# Patient Record
Sex: FEMALE | Race: BLACK OR AFRICAN AMERICAN | NOT HISPANIC OR LATINO | Employment: UNEMPLOYED | URBAN - METROPOLITAN AREA
[De-identification: names, ages, dates, MRNs, and addresses within clinical notes are randomized per-mention and may not be internally consistent; named-entity substitution may affect disease eponyms.]

---

## 2019-03-26 ENCOUNTER — INITIAL PRENATAL (OUTPATIENT)
Dept: OBSTETRICS AND GYNECOLOGY | Facility: CLINIC | Age: 34
End: 2019-03-26
Payer: MEDICAID

## 2019-03-26 ENCOUNTER — APPOINTMENT (OUTPATIENT)
Dept: LAB | Facility: HOSPITAL | Age: 34
End: 2019-03-26
Attending: OBSTETRICS & GYNECOLOGY
Payer: MEDICAID

## 2019-03-26 VITALS — SYSTOLIC BLOOD PRESSURE: 100 MMHG | DIASTOLIC BLOOD PRESSURE: 64 MMHG | WEIGHT: 114 LBS

## 2019-03-26 DIAGNOSIS — Z3A.18 18 WEEKS GESTATION OF PREGNANCY: Primary | ICD-10-CM

## 2019-03-26 LAB
ABO + RH BLD: NORMAL
BASOPHILS # BLD AUTO: 0.06 K/UL (ref 0–0.2)
BASOPHILS NFR BLD: 0.6 % (ref 0–1.9)
BLD GP AB SCN CELLS X3 SERPL QL: NORMAL
DIFFERENTIAL METHOD: ABNORMAL
EOSINOPHIL # BLD AUTO: 0 K/UL (ref 0–0.5)
EOSINOPHIL NFR BLD: 0.2 % (ref 0–8)
ERYTHROCYTE [DISTWIDTH] IN BLOOD BY AUTOMATED COUNT: 13.4 % (ref 11.5–14.5)
HCT VFR BLD AUTO: 32.2 % (ref 37–48.5)
HGB BLD-MCNC: 10.4 G/DL (ref 12–16)
IMM GRANULOCYTES # BLD AUTO: 0.04 K/UL (ref 0–0.04)
IMM GRANULOCYTES NFR BLD AUTO: 0.4 % (ref 0–0.5)
LYMPHOCYTES # BLD AUTO: 1.9 K/UL (ref 1–4.8)
LYMPHOCYTES NFR BLD: 19.6 % (ref 18–48)
MCH RBC QN AUTO: 30.3 PG (ref 27–31)
MCHC RBC AUTO-ENTMCNC: 32.3 G/DL (ref 32–36)
MCV RBC AUTO: 94 FL (ref 82–98)
MONOCYTES # BLD AUTO: 0.5 K/UL (ref 0.3–1)
MONOCYTES NFR BLD: 5.1 % (ref 4–15)
NEUTROPHILS # BLD AUTO: 7 K/UL (ref 1.8–7.7)
NEUTROPHILS NFR BLD: 74.1 % (ref 38–73)
NRBC BLD-RTO: 0 /100 WBC
PLATELET # BLD AUTO: 293 K/UL (ref 150–350)
PMV BLD AUTO: 10.1 FL (ref 9.2–12.9)
RBC # BLD AUTO: 3.43 M/UL (ref 4–5.4)
WBC # BLD AUTO: 9.43 K/UL (ref 3.9–12.7)

## 2019-03-26 PROCEDURE — 99202 OFFICE O/P NEW SF 15 MIN: CPT | Mod: PBBFAC,PO | Performed by: ADVANCED PRACTICE MIDWIFE

## 2019-03-26 PROCEDURE — 87086 URINE CULTURE/COLONY COUNT: CPT

## 2019-03-26 PROCEDURE — 85025 COMPLETE CBC W/AUTO DIFF WBC: CPT

## 2019-03-26 PROCEDURE — 86703 HIV-1/HIV-2 1 RESULT ANTBDY: CPT

## 2019-03-26 PROCEDURE — 99999 PR PBB SHADOW E&M-NEW PATIENT-LVL II: ICD-10-PCS | Mod: PBBFAC,,, | Performed by: ADVANCED PRACTICE MIDWIFE

## 2019-03-26 PROCEDURE — 87340 HEPATITIS B SURFACE AG IA: CPT

## 2019-03-26 PROCEDURE — 86850 RBC ANTIBODY SCREEN: CPT

## 2019-03-26 PROCEDURE — 99213 PR OFFICE/OUTPT VISIT, EST, LEVL III, 20-29 MIN: ICD-10-PCS | Mod: TH,S$PBB,, | Performed by: ADVANCED PRACTICE MIDWIFE

## 2019-03-26 PROCEDURE — 86592 SYPHILIS TEST NON-TREP QUAL: CPT

## 2019-03-26 PROCEDURE — 99213 OFFICE O/P EST LOW 20 MIN: CPT | Mod: TH,S$PBB,, | Performed by: ADVANCED PRACTICE MIDWIFE

## 2019-03-26 PROCEDURE — 99999 PR PBB SHADOW E&M-NEW PATIENT-LVL II: CPT | Mod: PBBFAC,,, | Performed by: ADVANCED PRACTICE MIDWIFE

## 2019-03-26 PROCEDURE — 86762 RUBELLA ANTIBODY: CPT

## 2019-03-26 NOTE — PROGRESS NOTES
In today for initial OB visit- pt with previous prenatal care in [place]- reports had US done but unsure of JASWANT. Discussed scheduling of dating / guero scan. Will obtain NOB labs today. Pt with recent diagnosis of pilonidal cyst- was drained and packed in an ER yesterday. Will schedule pt at [facility] for follow up.

## 2019-03-27 ENCOUNTER — SURGICAL CONSULT (OUTPATIENT)
Dept: SURGERY | Facility: CLINIC | Age: 34
End: 2019-03-27
Payer: MEDICAID

## 2019-03-27 VITALS — DIASTOLIC BLOOD PRESSURE: 56 MMHG | SYSTOLIC BLOOD PRESSURE: 98 MMHG | HEART RATE: 90 BPM | WEIGHT: 117.31 LBS

## 2019-03-27 DIAGNOSIS — L05.01 PILONIDAL CYST WITH ABSCESS: Primary | ICD-10-CM

## 2019-03-27 LAB
BACTERIA UR CULT: NORMAL
HBV SURFACE AG SERPL QL IA: NEGATIVE
HIV 1+2 AB+HIV1 P24 AG SERPL QL IA: NEGATIVE
RPR SER QL: NORMAL
RUBV IGG SER-ACNC: 53.1 IU/ML
RUBV IGG SER-IMP: REACTIVE

## 2019-03-27 PROCEDURE — 99204 PR OFFICE/OUTPT VISIT, NEW, LEVL IV, 45-59 MIN: ICD-10-PCS | Mod: S$PBB,,, | Performed by: SURGERY

## 2019-03-27 PROCEDURE — 99204 OFFICE O/P NEW MOD 45 MIN: CPT | Mod: S$PBB,,, | Performed by: SURGERY

## 2019-03-27 RX ORDER — AMOXICILLIN AND CLAVULANATE POTASSIUM 875; 125 MG/1; MG/1
1 TABLET, FILM COATED ORAL EVERY 12 HOURS
Qty: 10 TABLET | Refills: 0 | Status: SHIPPED | OUTPATIENT
Start: 2019-03-27 | End: 2019-04-01

## 2019-03-27 NOTE — H&P
History & Physical    SUBJECTIVE:     History of Present Illness:  Patient is a 33 y.o. female presents with pilonidal abscess s/p I and D in [facility]. States she had symptoms for a few days and went to ED, where it was drained and she was ent home with packing.  No follow up was given at time and she was not given PO abx.  Packing was removed in clinic today and she still had some residual purulent fluid  Repacked with iodoform and discussed need to change packing in 2 days with family  Given abx and explained importance of taking them as prescribed  Discussed wound care instructions as well such as showering and dressings  Chief Complaint   Patient presents with    Pilonidal Disease    Consult       Review of patient's allergies indicates:  No Known Allergies    Current Outpatient Medications   Medication Sig Dispense Refill    amoxicillin-clavulanate 875-125mg (AUGMENTIN) 875-125 mg per tablet Take 1 tablet by mouth every 12 (twelve) hours. for 5 days 10 tablet 0     No current facility-administered medications for this visit.        History reviewed. No pertinent past medical history.  History reviewed. No pertinent surgical history.  History reviewed. No pertinent family history.  Social History     Tobacco Use    Smoking status: Never Smoker    Smokeless tobacco: Never Used   Substance Use Topics    Alcohol use: Not Currently    Drug use: Never        Review of Systems:  Review of Systems   Constitutional: Negative for appetite change, fatigue, fever and unexpected weight change.   HENT: Negative for sore throat and trouble swallowing.    Eyes: Negative.    Respiratory: Negative for cough, shortness of breath and wheezing.    Cardiovascular: Negative for chest pain and leg swelling.   Gastrointestinal: Negative for abdominal distention, abdominal pain, blood in stool, constipation, diarrhea, nausea and vomiting.   Endocrine: Negative.    Genitourinary: Negative.    Musculoskeletal: Negative  for back pain.        +gluteal cleft pain   Skin: Negative.  Negative for rash.   Allergic/Immunologic: Negative.    Neurological: Negative.    Hematological: Negative.    Psychiatric/Behavioral: Negative for confusion.       OBJECTIVE:     Vital Signs (Most Recent)  Pulse: 90 (03/27/19 1416)  BP: (!) 98/56 (03/27/19 1416)     53.2 kg (117 lb 4.6 oz)     Physical Exam:  Physical Exam   Constitutional: She is oriented to person, place, and time. She appears well-developed and well-nourished.   HENT:   Head: Normocephalic and atraumatic.   Eyes: EOM are normal.   Neck: Normal range of motion.   Cardiovascular: Normal rate and normal heart sounds.   Pulmonary/Chest: Effort normal.   Abdominal: Soft. Bowel sounds are normal. She exhibits no distension. There is no tenderness.   Musculoskeletal: Normal range of motion.   Neurological: She is alert and oriented to person, place, and time.   Skin: Skin is warm and dry. Capillary refill takes less than 2 seconds.        Psychiatric: She has a normal mood and affect. Her behavior is normal.   Nursing note and vitals reviewed.    ASSESSMENT/PLAN:     Pilonidal abscess s/p I and D    PLAN:Plan     PO abx  Packing removal on Friday  RTC next week if not improved

## 2019-03-28 ENCOUNTER — TELEPHONE (OUTPATIENT)
Dept: MATERNAL FETAL MEDICINE | Facility: CLINIC | Age: 34
End: 2019-03-28

## 2019-03-28 NOTE — TELEPHONE ENCOUNTER
Scheduled patient for next available on 4/9 at 3:00. Pt states she has to  her child at 2:45 and let her know we would try to see her before her appointment time if there are any no-shows. States she will figure out  if not. Verbalizes understanding of appt time and location.

## 2019-04-09 ENCOUNTER — ROUTINE PRENATAL (OUTPATIENT)
Dept: OBSTETRICS AND GYNECOLOGY | Facility: CLINIC | Age: 34
End: 2019-04-09
Payer: MEDICAID

## 2019-04-09 ENCOUNTER — PROCEDURE VISIT (OUTPATIENT)
Dept: MATERNAL FETAL MEDICINE | Facility: CLINIC | Age: 34
End: 2019-04-09
Payer: MEDICAID

## 2019-04-09 VITALS — DIASTOLIC BLOOD PRESSURE: 60 MMHG | WEIGHT: 116.63 LBS | SYSTOLIC BLOOD PRESSURE: 100 MMHG

## 2019-04-09 DIAGNOSIS — Z34.02 ENCOUNTER FOR SUPERVISION OF NORMAL FIRST PREGNANCY IN SECOND TRIMESTER: Primary | ICD-10-CM

## 2019-04-09 DIAGNOSIS — Z3A.18 18 WEEKS GESTATION OF PREGNANCY: ICD-10-CM

## 2019-04-09 DIAGNOSIS — Z36.89 ENCOUNTER FOR ULTRASOUND TO CHECK FETAL GROWTH: Primary | ICD-10-CM

## 2019-04-09 PROCEDURE — 99213 PR OFFICE/OUTPT VISIT, EST, LEVL III, 20-29 MIN: ICD-10-PCS | Mod: TH,S$PBB,, | Performed by: ADVANCED PRACTICE MIDWIFE

## 2019-04-09 PROCEDURE — 99999 PR PBB SHADOW E&M-EST. PATIENT-LVL III: CPT | Mod: PBBFAC,,, | Performed by: ADVANCED PRACTICE MIDWIFE

## 2019-04-09 PROCEDURE — 99213 OFFICE O/P EST LOW 20 MIN: CPT | Mod: TH,S$PBB,, | Performed by: ADVANCED PRACTICE MIDWIFE

## 2019-04-09 PROCEDURE — 99499 UNLISTED E&M SERVICE: CPT | Mod: S$PBB,,, | Performed by: PEDIATRICS

## 2019-04-09 PROCEDURE — 99499 NO LOS: ICD-10-PCS | Mod: S$PBB,,, | Performed by: PEDIATRICS

## 2019-04-09 PROCEDURE — 99213 OFFICE O/P EST LOW 20 MIN: CPT | Mod: PBBFAC,TH,PO,25 | Performed by: ADVANCED PRACTICE MIDWIFE

## 2019-04-09 PROCEDURE — 76805 OB US >/= 14 WKS SNGL FETUS: CPT | Mod: PBBFAC,PO | Performed by: PEDIATRICS

## 2019-04-09 PROCEDURE — 99999 PR PBB SHADOW E&M-EST. PATIENT-LVL III: ICD-10-PCS | Mod: PBBFAC,,, | Performed by: ADVANCED PRACTICE MIDWIFE

## 2019-04-09 PROCEDURE — 76805 OB US >/= 14 WKS SNGL FETUS: CPT | Mod: 26,S$PBB,, | Performed by: PEDIATRICS

## 2019-04-09 PROCEDURE — 76805 PR US, OB 14+WKS, TRANSABD, SINGLE GESTATION: ICD-10-PCS | Mod: 26,S$PBB,, | Performed by: PEDIATRICS

## 2019-04-09 NOTE — PROGRESS NOTES
Chief Complaint   Patient presents with    Routine Prenatal Visit       33 y.o., at 20w1d by Estimated Date of Delivery: 19    Complaints today: None    ROS  OBSTETRICS:   Contractions denies   Bleeding denies   Loss of fluid denies   Fetal mvmnt slight  GASTRO:   Nausea none   Vomiting none      OB History    Para Term  AB Living   1             SAB TAB Ectopic Multiple Live Births                  # Outcome Date GA Lbr Kaden/2nd Weight Sex Delivery Anes PTL Lv   1 Current                Dating reviewed  Allergies and problem list reviewed and updated  Medical and surgical history reviewed  Prenatal labs reviewed and updated    PHYSICAL EXAM  /60   Wt 52.9 kg (116 lb 10 oz)   LMP 2018 (Exact Date)     GENERAL: No acute distress  NEURO: Alert and oriented x3  PSYCH: Normal mood and affect  PULMONARY: Non-labored respiration  ABDomen: Soft, gravid, nontender    ASSESSMENT AND PLAN    active 2019 Problems (from 19 to present)     No problems associated with this episode.            Reports had appt with general surgery for evaluation of pilonidal cyst- reports packing has been removed and it is much improved   labor precautions given  Follow-up: 4 weeks

## 2019-04-10 NOTE — PROGRESS NOTES
Indication  ========    Fetal anatomy survey/ LTOC.    Method  ======    Transabdominal ultrasound examination, 2D Color Doppler, Voluson E8. View: Suboptimal view: limited by fetal position.    Pregnancy  =========    Lobato pregnancy. Number of fetuses: 1.    Dating  ======    LMP on: 11/19/2018  Cycle: regular cycle  GA by LMP 20 w + 1 d  JASWANT by LMP: 8/26/2019  Ultrasound examination on: 4/9/2019  GA by U/S based upon: AC, BPD, Femur, HC  GA by U/S 21 w + 1 d  JASWANT by U/S: 8/19/2019  Assigned: Dating performed on 04/9/2019, based on the LMP  Assigned GA 20 w + 1 d  Assigned JASWANT: 8/26/2019    General Evaluation  ==============    Cardiac activity: present.  bpm.  Fetal movements: visualized.  Presentation: cephalic.  Placenta: posterior.  Umbilical cord: placental insertion: normal, normal, 3 vessel cord.  Amniotic fluid: normal amount.    Fetal Biometry  ============    Fetal Biometry  BPD 51.8 mm 21w 5d Hadlock  OFD 61.2 mm 21w 0d Jazzmine  .3 mm 20w 5d Hadlock  .2 mm 21w 2d Hadlock  Femur 34.3 mm 20w 6d Hadlock  Humerus 34.0 mm 21w 4d Jazzmine   g 43% Ernst  Calculated by: Hadlock (BPD-HC-AC-FL)  EFW (lb) 0 lb  EFW (oz) 14 oz  Cephalic index 0.85  HC / AC 1.13  FL / BPD 0.66  FL / HC 0.19  FL / AC 0.21   bpm    Fetal Anatomy  ============    Cranium: normal  Lateral ventricles: suboptimal  Choroid plexus: normal  Midline falx: normal  Cavum septi pellucidi: normal  Cerebellum: suboptimal  Cisterna magna: suboptimal  Rt lateral ventricle: suboptimal  Lt lateral ventricle: suboptimal  Rt choroid plexus: normal  Lt choroid plexus: normal  Posterior fossa: suboptimal  Nuchal fold: suboptimal  Lips: abnormal  Profile: normal  Nose: suboptimal  4-chamber view: normal  RVOT: normal  LVOT: normal  Situs: normal  Cardiac position: normal  Cardiac axis: normal  Cardiac size: normal  Cardiac rhythm: normal  Rt lung: normal  Lt lung: normal  Diaphragm: normal  Cord  insertion: normal  Stomach: normal  Kidneys: normal  Bladder: normal  Genitals: normal  Abdom. wall: appears normal  Abdom. cavity: normal  Rt kidney: normal  Lt kidney: normal  Cervical spine: normal  Thoracic spine: normal  Lumbar spine: normal  Sacral spine: normal  Arms: normal  Legs: normal  Rt arm: normal  Lt arm: normal  Rt hand: present  Lt hand: present  Rt leg: normal  Lt leg: normal  Rt foot: normal  Lt foot: normal  Gender: male  Wants to know gender: yes    Maternal Structures  ===============    Uterus / Cervix  Uterus: Normal  Cervical length 28.9 mm  Ovaries / Tubes / Adnexa  Rt ovary: Visualized  Rt ovary D1 2.6 cm  Rt ovary D2 1.9 cm  Rt ovary D3 1.8 cm  Rt ovary mean 2.1 cm  Rt ovary vol 4.7 cm³  Lt ovary: Visualized  Lt ovary D1 2.7 cm  Lt ovary D2 2.4 cm  Lt ovary D3 1.4 cm  Lt ovary mean 2.2 cm  Lt ovary vol 4.7 cm³          Impression  =========    A mustafa living IUP is identified.    Fetal size is appropriate for established dating.    No fetal structural malformations are identified; however, the anatomic survey is incomplete as noted above. The patient will be scheduled for a  f/u exam to complete the anatomic survey.    Cervical length is normal, and placental location is normal without evidence of previa.            Recommendation  ==============    We recommend evaluation in 4 weeks.      Thank you again for allowing us to participate in the care of your patients. If you have any questions concerning today's consultation, feel free  to contact me or one of my partners. We can be reached at [phone] during normal business hours. If you have a question after normal  business hours, please contact Labor and Delivery at [phone].

## 2019-05-07 ENCOUNTER — PROCEDURE VISIT (OUTPATIENT)
Dept: MATERNAL FETAL MEDICINE | Facility: CLINIC | Age: 34
End: 2019-05-07
Payer: MEDICAID

## 2019-05-07 ENCOUNTER — ROUTINE PRENATAL (OUTPATIENT)
Dept: OBSTETRICS AND GYNECOLOGY | Facility: CLINIC | Age: 34
End: 2019-05-07
Payer: MEDICAID

## 2019-05-07 ENCOUNTER — APPOINTMENT (OUTPATIENT)
Dept: LAB | Facility: HOSPITAL | Age: 34
End: 2019-05-07
Attending: ADVANCED PRACTICE MIDWIFE
Payer: MEDICAID

## 2019-05-07 VITALS — WEIGHT: 123 LBS | SYSTOLIC BLOOD PRESSURE: 100 MMHG | DIASTOLIC BLOOD PRESSURE: 60 MMHG

## 2019-05-07 DIAGNOSIS — Z34.02 ENCOUNTER FOR SUPERVISION OF NORMAL FIRST PREGNANCY IN SECOND TRIMESTER: ICD-10-CM

## 2019-05-07 DIAGNOSIS — Z3A.24 24 WEEKS GESTATION OF PREGNANCY: Primary | ICD-10-CM

## 2019-05-07 DIAGNOSIS — Z36.89 ENCOUNTER FOR ULTRASOUND TO CHECK FETAL GROWTH: ICD-10-CM

## 2019-05-07 LAB
BASOPHILS # BLD AUTO: 0.08 K/UL (ref 0–0.2)
BASOPHILS NFR BLD: 0.7 % (ref 0–1.9)
DIFFERENTIAL METHOD: ABNORMAL
EOSINOPHIL # BLD AUTO: 0 K/UL (ref 0–0.5)
EOSINOPHIL NFR BLD: 0.3 % (ref 0–8)
ERYTHROCYTE [DISTWIDTH] IN BLOOD BY AUTOMATED COUNT: 14.2 % (ref 11.5–14.5)
GLUCOSE SERPL-MCNC: 64 MG/DL (ref 70–140)
HCT VFR BLD AUTO: 32.7 % (ref 37–48.5)
HGB BLD-MCNC: 10.6 G/DL (ref 12–16)
IMM GRANULOCYTES # BLD AUTO: 0.26 K/UL (ref 0–0.04)
IMM GRANULOCYTES NFR BLD AUTO: 2.2 % (ref 0–0.5)
LYMPHOCYTES # BLD AUTO: 2.3 K/UL (ref 1–4.8)
LYMPHOCYTES NFR BLD: 19.1 % (ref 18–48)
MCH RBC QN AUTO: 31 PG (ref 27–31)
MCHC RBC AUTO-ENTMCNC: 32.4 G/DL (ref 32–36)
MCV RBC AUTO: 96 FL (ref 82–98)
MONOCYTES # BLD AUTO: 0.9 K/UL (ref 0.3–1)
MONOCYTES NFR BLD: 7.1 % (ref 4–15)
NEUTROPHILS # BLD AUTO: 8.5 K/UL (ref 1.8–7.7)
NEUTROPHILS NFR BLD: 70.6 % (ref 38–73)
NRBC BLD-RTO: 0 /100 WBC
PLATELET # BLD AUTO: 261 K/UL (ref 150–350)
PMV BLD AUTO: 10.3 FL (ref 9.2–12.9)
RBC # BLD AUTO: 3.42 M/UL (ref 4–5.4)
WBC # BLD AUTO: 12.03 K/UL (ref 3.9–12.7)

## 2019-05-07 PROCEDURE — 99213 PR OFFICE/OUTPT VISIT, EST, LEVL III, 20-29 MIN: ICD-10-PCS | Mod: TH,S$PBB,, | Performed by: ADVANCED PRACTICE MIDWIFE

## 2019-05-07 PROCEDURE — 76816 PR  US,PREGNANT UTERUS,F/U,TRANSABD APP: ICD-10-PCS | Mod: 26,S$PBB,, | Performed by: PEDIATRICS

## 2019-05-07 PROCEDURE — 76816 OB US FOLLOW-UP PER FETUS: CPT | Mod: 26,S$PBB,, | Performed by: PEDIATRICS

## 2019-05-07 PROCEDURE — 82950 GLUCOSE TEST: CPT

## 2019-05-07 PROCEDURE — 99499 NO LOS: ICD-10-PCS | Mod: S$PBB,,, | Performed by: PEDIATRICS

## 2019-05-07 PROCEDURE — 76816 OB US FOLLOW-UP PER FETUS: CPT | Mod: PBBFAC,PO | Performed by: PEDIATRICS

## 2019-05-07 PROCEDURE — 99499 UNLISTED E&M SERVICE: CPT | Mod: S$PBB,,, | Performed by: PEDIATRICS

## 2019-05-07 PROCEDURE — 99213 OFFICE O/P EST LOW 20 MIN: CPT | Mod: PBBFAC,TH,PO,25 | Performed by: ADVANCED PRACTICE MIDWIFE

## 2019-05-07 PROCEDURE — 99213 OFFICE O/P EST LOW 20 MIN: CPT | Mod: TH,S$PBB,, | Performed by: ADVANCED PRACTICE MIDWIFE

## 2019-05-07 PROCEDURE — 99999 PR PBB SHADOW E&M-EST. PATIENT-LVL III: ICD-10-PCS | Mod: PBBFAC,,, | Performed by: ADVANCED PRACTICE MIDWIFE

## 2019-05-07 PROCEDURE — 85025 COMPLETE CBC W/AUTO DIFF WBC: CPT

## 2019-05-07 PROCEDURE — 99999 PR PBB SHADOW E&M-EST. PATIENT-LVL III: CPT | Mod: PBBFAC,,, | Performed by: ADVANCED PRACTICE MIDWIFE

## 2019-05-07 NOTE — PROGRESS NOTES
Indication  ========    Follow-up evaluation for fetal growth, Follow-up evaluation of anatomy.    Method  ======    Transabdominal ultrasound examination, 2D Color Doppler, Voluson S8. View: Good view.    Pregnancy  =========    Lobato pregnancy. Number of fetuses: 1.    Dating  ======    LMP on: 11/19/2018  Cycle: regular cycle  GA by LMP 24 w + 1 d  JASWANT by LMP: 8/26/2019  Ultrasound examination on: 5/7/2019  GA by U/S based upon: AC, BPD, Femur, HC  GA by U/S 25 w + 5 d  JASWANT by U/S: 8/15/2019  Assigned: Dating performed on 04/9/2019, based on the LMP  Assigned GA 24 w + 1 d  Assigned JASWANT: 8/26/2019    General Evaluation  ==============    Cardiac activity: present.  bpm.  Fetal movements: visualized.  Presentation: cephalic.  Placenta: posterior.  Umbilical cord: 3 vessel cord.  Amniotic fluid: MVP 7.2 cm.    Fetal Biometry  ============    Fetal Biometry  BPD 65.4 mm 26w 3d Hadlock  OFD 82.3 mm 26w 5d Jazzmine  .5 mm 25w 6d Hadlock  .7 mm 25w 0d Hadlock  Femur 46.3 mm 25w 3d Hadlock   g 67% Ernst  Calculated by: Hadlock (BPD-HC-AC-FL)  EFW (lb) 1 lb  EFW (oz) 12 oz  Cephalic index 0.79  HC / AC 1.16  FL / BPD 0.71  FL / HC 0.19  FL / AC 0.23  MVP 7.2 cm   bpm  Head / Face / Neck   7.9 mm    Fetal Anatomy  ============    Cranium: normal  Lateral ventricles: normal  Cerebellum: normal  Cisterna magna: normal  Posterior fossa: normal  Lips: normal  Nose: normal  Stomach: normal  Kidneys: normal  Bladder: normal  Genitals: documented previously  Gender: male  Wants to know gender: yes          Impression  =========    The fetal anatomic survey was completed today, and no fetal structural abnormalities were noted.  Interval fetal growth has been normal, and the AFV is normal.    F/U as clinically indicated.      NOTE: Genetic screening?            Recommendation  ==============    Repeat ultrasound study as clinically indicated.      Thank you again for allowing us to  participate in the care of your patients. If you have any questions concerning today's consultation, feel free  to contact me or one of my partners. We can be reached at [phone] during normal business hours. If you have a question after normal  business hours, please contact Labor and Delivery at [phone].

## 2019-05-08 NOTE — PROGRESS NOTES
Chief Complaint   Patient presents with    Routine Prenatal Visit       33 y.o., at 24w2d by Estimated Date of Delivery: 19    Complaints today: None    ROS  OBSTETRICS:   Contractions denies   Bleeding denies   Loss of fluid denies   Fetal mvmnt Reports good FM  GASTRO:   Nausea denies   Vomiting denies      OB History    Para Term  AB Living   1             SAB TAB Ectopic Multiple Live Births                  # Outcome Date GA Lbr Kaden/2nd Weight Sex Delivery Anes PTL Lv   1 Current                Dating reviewed  Allergies and problem list reviewed and updated  Medical and surgical history reviewed  Prenatal labs reviewed and updated    PHYSICAL EXAM  /60   Wt 55.8 kg (123 lb 0.3 oz)   LMP 2018 (Exact Date)     GENERAL: No acute distress  HEENT: Normocephalic  NEURO: Alert and oriented x3  PSYCH: Normal mood and affect  PULMONARY: Non-labored respiration; no tachypnea  ABD: Soft, gravid, nontender; no hernia or hepatosplenomegaly    ASSESSMENT AND PLAN    active 2019 Problems (from 19 to present)     No problems associated with this episode.            DM screen done today   labor precautions given  Follow-up: 4 weeks

## 2019-06-04 ENCOUNTER — ROUTINE PRENATAL (OUTPATIENT)
Dept: OBSTETRICS AND GYNECOLOGY | Facility: CLINIC | Age: 34
End: 2019-06-04
Payer: MEDICAID

## 2019-06-04 VITALS
DIASTOLIC BLOOD PRESSURE: 58 MMHG | SYSTOLIC BLOOD PRESSURE: 100 MMHG | BODY MASS INDEX: 21.91 KG/M2 | HEIGHT: 64 IN | WEIGHT: 128.31 LBS

## 2019-06-04 DIAGNOSIS — Z34.03 ENCOUNTER FOR SUPERVISION OF NORMAL FIRST PREGNANCY IN THIRD TRIMESTER: ICD-10-CM

## 2019-06-04 PROCEDURE — 99999 PR PBB SHADOW E&M-EST. PATIENT-LVL III: ICD-10-PCS | Mod: PBBFAC,,, | Performed by: STUDENT IN AN ORGANIZED HEALTH CARE EDUCATION/TRAINING PROGRAM

## 2019-06-04 PROCEDURE — 99999 PR PBB SHADOW E&M-EST. PATIENT-LVL III: CPT | Mod: PBBFAC,,, | Performed by: STUDENT IN AN ORGANIZED HEALTH CARE EDUCATION/TRAINING PROGRAM

## 2019-06-04 PROCEDURE — 99213 OFFICE O/P EST LOW 20 MIN: CPT | Mod: TH,S$PBB,, | Performed by: STUDENT IN AN ORGANIZED HEALTH CARE EDUCATION/TRAINING PROGRAM

## 2019-06-04 PROCEDURE — 99213 PR OFFICE/OUTPT VISIT, EST, LEVL III, 20-29 MIN: ICD-10-PCS | Mod: TH,S$PBB,, | Performed by: STUDENT IN AN ORGANIZED HEALTH CARE EDUCATION/TRAINING PROGRAM

## 2019-06-04 PROCEDURE — 99213 OFFICE O/P EST LOW 20 MIN: CPT | Mod: PBBFAC,TH,PO | Performed by: STUDENT IN AN ORGANIZED HEALTH CARE EDUCATION/TRAINING PROGRAM

## 2019-06-04 RX ORDER — FERROUS SULFATE 324(65)MG
325 TABLET, DELAYED RELEASE (ENTERIC COATED) ORAL DAILY
Refills: 0 | COMMUNITY
Start: 2019-06-04 | End: 2024-03-25

## 2019-06-04 NOTE — PROGRESS NOTES
Subjective:       Patient ID: Katey Cedillo is a 33 y.o. female.    Chief Complaint:  Routine Prenatal Visit      History of Present Illness  Katey Cedillo is a 33 y.o. F at 28w1d presents for routine ob visit. Patient is without complaint today and overall feeling well. She is considering ppBTL but would like to discuss with her partner before officially signing consents.   This IUP is complicated by anemia.  Patient denies contractions, denies vaginal bleeding, denies LOF.   Fetal Movement: normal.        GYN & OB History  Patient's last menstrual period was 2018 (exact date).   Date of Last Pap: No result found    OB History    Para Term  AB Living   1             SAB TAB Ectopic Multiple Live Births                  # Outcome Date GA Lbr Kaden/2nd Weight Sex Delivery Anes PTL Lv   1 Current                Review of Systems  Review of Systems   Constitutional: Negative for activity change, appetite change, chills and fever.   Eyes: Negative for visual disturbance.   Respiratory: Negative for cough and shortness of breath.    Cardiovascular: Negative for chest pain.   Gastrointestinal: Negative for abdominal pain, constipation, nausea and vomiting.   Genitourinary: Negative for dysuria, frequency, hematuria, pelvic pain, urgency, vaginal bleeding, vaginal discharge and vaginal odor.   Musculoskeletal: Negative for myalgias.   Integumentary:  Negative for rash.   Neurological: Negative for headaches.   Psychiatric/Behavioral: Negative for depression and sleep disturbance. The patient is not nervous/anxious.            Objective:    Physical Exam:   Constitutional: She is oriented to person, place, and time. She appears well-developed and well-nourished. No distress.    HENT:   Head: Normocephalic and atraumatic.    Eyes: Conjunctivae and EOM are normal.    Neck: Normal range of motion. Neck supple. No thyromegaly present.    Cardiovascular: Normal rate and regular rhythm.      Pulmonary/Chest: Effort normal. No respiratory distress.        Abdominal: Soft. She exhibits no distension. There is no tenderness.             Musculoskeletal: Normal range of motion and moves all extremeties. She exhibits no edema or tenderness.       Neurological: She is alert and oriented to person, place, and time.    Skin: Skin is warm and dry. No rash noted.    Psychiatric: She has a normal mood and affect. Her behavior is normal.          Assessment:        1. Encounter for supervision of normal first pregnancy in third trimester               Plan:      Katey was seen today for routine prenatal visit.    Diagnoses and all orders for this visit:    Encounter for supervision of normal first pregnancy in third trimester  - labs UTD  - hemoglobin 10.7; begin iron   - considering ppBTL; sign consents at next visit after she discusses with partner    Other orders  -     ferrous sulfate 324 mg (65 mg iron) TbEC; Take 1 tablet (324 mg total) by mouth once daily.        No orders of the defined types were placed in this encounter.      Follow up in about 2 weeks (around 6/18/2019).    Vida Spring MD  OBGYN, PGY-1

## 2019-06-19 ENCOUNTER — PATIENT MESSAGE (OUTPATIENT)
Dept: OBSTETRICS AND GYNECOLOGY | Facility: CLINIC | Age: 34
End: 2019-06-19

## 2019-07-22 ENCOUNTER — INITIAL PRENATAL (OUTPATIENT)
Dept: OBSTETRICS AND GYNECOLOGY | Facility: CLINIC | Age: 34
End: 2019-07-22
Payer: MEDICAID

## 2019-07-22 VITALS — WEIGHT: 132.81 LBS | DIASTOLIC BLOOD PRESSURE: 54 MMHG | BODY MASS INDEX: 22.8 KG/M2 | SYSTOLIC BLOOD PRESSURE: 100 MMHG

## 2019-07-22 DIAGNOSIS — Z34.92 PRENATAL CARE IN SECOND TRIMESTER: ICD-10-CM

## 2019-07-22 DIAGNOSIS — Z34.93 PRENATAL CARE IN THIRD TRIMESTER: Primary | ICD-10-CM

## 2019-07-22 PROCEDURE — 99213 PR OFFICE/OUTPT VISIT, EST, LEVL III, 20-29 MIN: ICD-10-PCS | Mod: S$PBB,TH,, | Performed by: ADVANCED PRACTICE MIDWIFE

## 2019-07-22 PROCEDURE — 99213 OFFICE O/P EST LOW 20 MIN: CPT | Mod: S$PBB,TH,, | Performed by: ADVANCED PRACTICE MIDWIFE

## 2019-07-22 PROCEDURE — 99212 OFFICE O/P EST SF 10 MIN: CPT | Mod: PBBFAC | Performed by: ADVANCED PRACTICE MIDWIFE

## 2019-07-22 PROCEDURE — 99999 PR PBB SHADOW E&M-EST. PATIENT-LVL II: ICD-10-PCS | Mod: PBBFAC,,, | Performed by: ADVANCED PRACTICE MIDWIFE

## 2019-07-22 PROCEDURE — 99999 PR PBB SHADOW E&M-EST. PATIENT-LVL II: CPT | Mod: PBBFAC,,, | Performed by: ADVANCED PRACTICE MIDWIFE

## 2019-07-22 PROCEDURE — 87491 CHLMYD TRACH DNA AMP PROBE: CPT

## 2019-07-22 NOTE — PROGRESS NOTES
Katey Cedillo is a 33 y.o. , presents today for a transfer of prenatal care within the [facility] system    C/C: transfer of prenatal care from [facility].    Dating via LMP c/w 20 US    Reports positive FM, denies VB, LOF or cramping.     SOCIAL HISTORY: Denies emotional/mental/physical/sexual violence or abuse. Feels safe at home.  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    PAP HISTORY: last pap beginning of preg possibly in [place] (need to get records)     Reports no long-term chronic medical conditions     Review of patient's allergies indicates:  No Known Allergies  Past Medical History:   Diagnosis Date    Depression     --alternative treatments ( hospitalization x 1)      History reviewed. No pertinent surgical history.  History reviewed. No pertinent surgical history.  OB History    Para Term  AB Living   2 1 1     1   SAB TAB Ectopic Multiple Live Births           1      # Outcome Date GA Lbr Kaden/2nd Weight Sex Delivery Anes PTL Lv   2 Current            1 Term 10/22/13 38w0d  2.892 kg (6 lb 6 oz) F Vag-Spont EPI N YUMIKO      Birth Comments: water broke evening      OB History        2    Para   1    Term   1            AB        Living   1       SAB        TAB        Ectopic        Multiple        Live Births   1               Social History     Socioeconomic History    Marital status: Single     Spouse name: Alvarado-partner    Number of children: Not on file    Years of education: Not on file    Highest education level: Not on file   Occupational History    Occupation: [employer]   Social Needs    Financial resource strain: Not on file    Food insecurity:     Worry: Not on file     Inability: Not on file    Transportation needs:     Medical: Not on file     Non-medical: Not on file   Tobacco Use    Smoking status: Never Smoker    Smokeless tobacco: Never Used   Substance and Sexual Activity    Alcohol  use: Not Currently    Drug use: Never    Sexual activity: Yes     Partners: Male     Comment: Nemo   Lifestyle    Physical activity:     Days per week: Not on file     Minutes per session: Not on file    Stress: Only a little   Relationships    Social connections:     Talks on phone: Not on file     Gets together: Not on file     Attends Zoroastrian service: Not on file     Active member of club or organization: Not on file     Attends meetings of clubs or organizations: Not on file     Relationship status: Not on file   Other Topics Concern    Not on file   Social History Narrative    Not on file     Family History   Problem Relation Age of Onset    Breast cancer Paternal Grandmother         > 49 y/o at diagnosis    Breast cancer Cousin         40's at diagnosis    Hypertension Neg Hx     Cancer Neg Hx     Ovarian cancer Neg Hx     Colon cancer Neg Hx      Social History     Substance and Sexual Activity   Sexual Activity Yes    Partners: Male    Comment: Nemo         GENETIC SCREENING   Patient's age 35 years or older as of estimated date of delivery? n  Neural tube defect (meningomyelocele, spina bifida, or anencephaly)? n  Down syndrome? n  Johann-Sachs (Ashkenazi Lutheran, Cajun, Turkmen [place])? n  Canavan disease (Ashkenazi Lutheran)? n  Familial dysautonomia (Ashkenazi Lutheran)? n  Sickle cell disease or trait ()? n  Hemophilia or other blood disorders? n  Cystic fibrosis? n  Muscular dystrophy? n  Lisa's chorea? n  Thalassemia (Italian, Greek, Mediterranean, or  background) MCV less than 80? n  Congenital heart defect? n  Mental retardation/autism? n   If Yes, was person tested for Fragile X? n  Other inherited genetic or chromosomal disorder? n  Maternal metabolic disorder (e.g. type 1 diabetes, PKU)? nn  Patient or baby's father had a child with birth defects not listed above? n  Recurrent pregnancy loss or a stillbirth: n  Medications (including supplements, vitamins, herbs  or OTC drugs)/illicit/recreational drugs/alcohol since last menstrual period? n   If yes, agent(s) and strength/dose: n  List any other genetic risks: n  Comments/counseling: n    INFECTION HISTORY  Live with someone with TB or exposed to TB: n  Patient or partner has history of genital herpes: n  Rash or viral illness since last menstrual period: n  Patient or partner has hepatitis B or C: n  History of STD, gonorrhea, chlamydia, HPV, HIV, syphilis (list all that apply): n  List other infections: n  Additional comments: n    OBJECTIVE: see prenatal flow sheet  BP (!) 100/54   Wt 60.2 kg (132 lb 13.2 oz)   LMP 2018 (Exact Date)   BMI 22.80 kg/m²   Constitutional/Gen: NAD, appears stated age, well groomed  Lung: normal resp effort  Extremities: FROM, with no edema or tenderness.  Neurologic: A&O x 4, non-focal, cranial nerves 2-12 grossly intact  Psych: affect appropriate and without signs of mood, thought or memory difficulty appreciated    ASSESSMENT:  33 y.o. female  at 35w2d for transfer of prenatal care  Body mass index is 22.8 kg/m².  Patient Active Problem List   Diagnosis    Encounter for supervision of normal first pregnancy in third trimester       PLAN:  1. Transfer of prenatal care within [facility]  -- Continue prenatal care  -- GBS culture next visit, tdap discussed and ordered    2. BMI 19  -- Discussed IOM recommended weight gain of:   Weight category Pre pre BMI  Recommended TWG   Underweight Less than 18.5 28-40    Normal Weight 18.5-24.9  25-35    Overweight 25-29.9  15-25    Obese   30 and greater  11-20   -- Discussed criteria for delivery at [facility] r/t excessive pre-preg weight or excessive weight gain:   Pre-pregnancy BMI over 40 or excess pregnancy weight gain defined as:   Pre-preg BMI < 18.5; Excess weight gain = > 60 pound   Pre-preg BMI 18.5-24.9;  Excess weight gain = > 53 pounds   Pre-preg BMI 25-29.9;  Excess weight gain = > 38 pounds   Pre-preg BMI > 30;  Excess  weight gain = > 30 pounds    3. Oriented to practice and midwifery service  -- Pregnancy education and couseling; handouts and booklet provided  -- She has not already attended [facility] and has not toured facility  -- Reviewed anticipated prenatal course of care and how to contact us  -- Reviewed [facility] guidelines and admission, exclusion, and transfer criteria  -- Precautions/warning signs reviewed  -- Common complaints of pregnancy  -- Routine prenatal labs including HIV  -- Ultrasounds  -- Childbirth education/hospital/[facility] facilities  -- Nutrition, prepregnant BMI, and recommended weight gain  -- Toxoplasmosis precautions (Cats/Raw Meat)  -- Sexual activity and exercise  -- Environmental/Work hazards  -- Travel  -- Tobacco (Ask, Advise, Assess, Assist, and Arrange), as well as alcohol and drug use  -- Use of any medications (Including supplements, Vitamins, Herbs, or OTC Drugs)  -- Domestic violence screen negative    4. Patient states she had genetic testing in [place] (try to get records)      6. History of depression--currently stable and resources provided, no current problems.  7. Reviewed warning signs, precautions, and how/when to contact us.     8. RTC x 1 weeks, call or present sooner prn.   Updated Medication List:  Current Outpatient Medications   Medication Sig Dispense Refill    prenatal vit,calc76/iron/folic (PNV 29-1 ORAL) Take 1 tablet by mouth once daily.      ferrous sulfate 324 mg (65 mg iron) TbEC Take 1 tablet (324 mg total) by mouth once daily.  0     No current facility-administered medications for this visit.          Coral Hernandez CNM, MSN  07/22/2019  1:48 PM      7/22/2019 1:48 PM

## 2019-07-23 LAB
C TRACH DNA SPEC QL NAA+PROBE: NOT DETECTED
N GONORRHOEA DNA SPEC QL NAA+PROBE: NOT DETECTED

## 2019-07-30 ENCOUNTER — ROUTINE PRENATAL (OUTPATIENT)
Dept: OBSTETRICS AND GYNECOLOGY | Facility: CLINIC | Age: 34
End: 2019-07-30
Payer: MEDICAID

## 2019-07-30 VITALS — WEIGHT: 135.5 LBS | DIASTOLIC BLOOD PRESSURE: 66 MMHG | SYSTOLIC BLOOD PRESSURE: 104 MMHG | BODY MASS INDEX: 23.25 KG/M2

## 2019-07-30 DIAGNOSIS — Z3A.36 36 WEEKS GESTATION OF PREGNANCY: Primary | ICD-10-CM

## 2019-07-30 PROCEDURE — 99213 OFFICE O/P EST LOW 20 MIN: CPT | Mod: TH,S$PBB,, | Performed by: ADVANCED PRACTICE MIDWIFE

## 2019-07-30 PROCEDURE — 87081 CULTURE SCREEN ONLY: CPT

## 2019-07-30 PROCEDURE — 99213 PR OFFICE/OUTPT VISIT, EST, LEVL III, 20-29 MIN: ICD-10-PCS | Mod: TH,S$PBB,, | Performed by: ADVANCED PRACTICE MIDWIFE

## 2019-07-30 PROCEDURE — 99999 PR PBB SHADOW E&M-EST. PATIENT-LVL III: CPT | Mod: PBBFAC,,, | Performed by: ADVANCED PRACTICE MIDWIFE

## 2019-07-30 PROCEDURE — 99213 OFFICE O/P EST LOW 20 MIN: CPT | Mod: PBBFAC | Performed by: ADVANCED PRACTICE MIDWIFE

## 2019-07-30 PROCEDURE — 99999 PR PBB SHADOW E&M-EST. PATIENT-LVL III: ICD-10-PCS | Mod: PBBFAC,,, | Performed by: ADVANCED PRACTICE MIDWIFE

## 2019-07-30 NOTE — PROGRESS NOTES
33 y.o. female  at 36w1d  Reports + FM, denies VB, LOF or regular CTX  Doing well without concerns   TW lbs   GBS collected today     RTC x 1 wks, call or present sooner prn.

## 2019-08-02 LAB — BACTERIA SPEC AEROBE CULT: NORMAL

## 2019-08-06 ENCOUNTER — LAB VISIT (OUTPATIENT)
Dept: LAB | Facility: OTHER | Age: 34
End: 2019-08-06
Payer: MEDICAID

## 2019-08-06 ENCOUNTER — ROUTINE PRENATAL (OUTPATIENT)
Dept: OBSTETRICS AND GYNECOLOGY | Facility: CLINIC | Age: 34
End: 2019-08-06
Payer: MEDICAID

## 2019-08-06 VITALS
BODY MASS INDEX: 23.37 KG/M2 | WEIGHT: 136.13 LBS | SYSTOLIC BLOOD PRESSURE: 104 MMHG | DIASTOLIC BLOOD PRESSURE: 58 MMHG

## 2019-08-06 DIAGNOSIS — Z34.83 ENCOUNTER FOR SUPERVISION OF OTHER NORMAL PREGNANCY IN THIRD TRIMESTER: Primary | ICD-10-CM

## 2019-08-06 DIAGNOSIS — Z3A.36 36 WEEKS GESTATION OF PREGNANCY: ICD-10-CM

## 2019-08-06 LAB
BASOPHILS # BLD AUTO: 0.06 K/UL (ref 0–0.2)
BASOPHILS NFR BLD: 0.6 % (ref 0–1.9)
DIFFERENTIAL METHOD: ABNORMAL
EOSINOPHIL # BLD AUTO: 0 K/UL (ref 0–0.5)
EOSINOPHIL NFR BLD: 0.3 % (ref 0–8)
ERYTHROCYTE [DISTWIDTH] IN BLOOD BY AUTOMATED COUNT: 13.1 % (ref 11.5–14.5)
HCT VFR BLD AUTO: 32.8 % (ref 37–48.5)
HGB BLD-MCNC: 10.6 G/DL (ref 12–16)
IMM GRANULOCYTES # BLD AUTO: 0.14 K/UL (ref 0–0.04)
IMM GRANULOCYTES NFR BLD AUTO: 1.4 % (ref 0–0.5)
LYMPHOCYTES # BLD AUTO: 1.8 K/UL (ref 1–4.8)
LYMPHOCYTES NFR BLD: 18.1 % (ref 18–48)
MCH RBC QN AUTO: 28.6 PG (ref 27–31)
MCHC RBC AUTO-ENTMCNC: 32.3 G/DL (ref 32–36)
MCV RBC AUTO: 88 FL (ref 82–98)
MONOCYTES # BLD AUTO: 0.7 K/UL (ref 0.3–1)
MONOCYTES NFR BLD: 7.1 % (ref 4–15)
NEUTROPHILS # BLD AUTO: 7.2 K/UL (ref 1.8–7.7)
NEUTROPHILS NFR BLD: 72.5 % (ref 38–73)
NRBC BLD-RTO: 0 /100 WBC
PLATELET # BLD AUTO: 264 K/UL (ref 150–350)
PMV BLD AUTO: 9.5 FL (ref 9.2–12.9)
RBC # BLD AUTO: 3.71 M/UL (ref 4–5.4)
WBC # BLD AUTO: 9.98 K/UL (ref 3.9–12.7)

## 2019-08-06 PROCEDURE — 85025 COMPLETE CBC W/AUTO DIFF WBC: CPT

## 2019-08-06 PROCEDURE — 36415 COLL VENOUS BLD VENIPUNCTURE: CPT

## 2019-08-06 PROCEDURE — 99999 PR PBB SHADOW E&M-EST. PATIENT-LVL III: ICD-10-PCS | Mod: PBBFAC,,, | Performed by: ADVANCED PRACTICE MIDWIFE

## 2019-08-06 PROCEDURE — 86592 SYPHILIS TEST NON-TREP QUAL: CPT

## 2019-08-06 PROCEDURE — 99999 PR PBB SHADOW E&M-EST. PATIENT-LVL III: CPT | Mod: PBBFAC,,, | Performed by: ADVANCED PRACTICE MIDWIFE

## 2019-08-06 PROCEDURE — 99213 OFFICE O/P EST LOW 20 MIN: CPT | Mod: TH,S$PBB,, | Performed by: ADVANCED PRACTICE MIDWIFE

## 2019-08-06 PROCEDURE — 99213 OFFICE O/P EST LOW 20 MIN: CPT | Mod: PBBFAC,TH | Performed by: ADVANCED PRACTICE MIDWIFE

## 2019-08-06 PROCEDURE — 99213 PR OFFICE/OUTPT VISIT, EST, LEVL III, 20-29 MIN: ICD-10-PCS | Mod: TH,S$PBB,, | Performed by: ADVANCED PRACTICE MIDWIFE

## 2019-08-06 PROCEDURE — 86703 HIV-1/HIV-2 1 RESULT ANTBDY: CPT

## 2019-08-06 NOTE — PROGRESS NOTES
34 y.o. female  at 37w1d   Reports + FM, denies VB, LOF or regular CTX  Doing well without concerns   TW lbs   Reviewed warning signs, normal FKCs, labor precautions and how/when to call.  RTC x 1 wks, call or present sooner prn.

## 2019-08-07 LAB
HIV 1+2 AB+HIV1 P24 AG SERPL QL IA: NEGATIVE
RPR SER QL: NORMAL

## 2019-08-08 PROBLEM — L05.91 PILONIDAL CYST: Status: ACTIVE | Noted: 2019-08-08

## 2019-08-08 PROBLEM — Z86.59 HISTORY OF DEPRESSION: Status: ACTIVE | Noted: 2019-08-08

## 2019-08-14 ENCOUNTER — ROUTINE PRENATAL (OUTPATIENT)
Dept: OBSTETRICS AND GYNECOLOGY | Facility: CLINIC | Age: 34
End: 2019-08-14
Payer: MEDICAID

## 2019-08-14 ENCOUNTER — CLINICAL SUPPORT (OUTPATIENT)
Dept: OBSTETRICS AND GYNECOLOGY | Facility: CLINIC | Age: 34
End: 2019-08-14
Payer: MEDICAID

## 2019-08-14 VITALS
SYSTOLIC BLOOD PRESSURE: 114 MMHG | DIASTOLIC BLOOD PRESSURE: 60 MMHG | BODY MASS INDEX: 23.39 KG/M2 | WEIGHT: 136.25 LBS

## 2019-08-14 DIAGNOSIS — Z34.83 ENCOUNTER FOR SUPERVISION OF OTHER NORMAL PREGNANCY IN THIRD TRIMESTER: Primary | ICD-10-CM

## 2019-08-14 DIAGNOSIS — Z34.92 PRENATAL CARE IN SECOND TRIMESTER: ICD-10-CM

## 2019-08-14 PROCEDURE — 99213 PR OFFICE/OUTPT VISIT, EST, LEVL III, 20-29 MIN: ICD-10-PCS | Mod: TH,S$PBB,, | Performed by: ADVANCED PRACTICE MIDWIFE

## 2019-08-14 PROCEDURE — 99999 PR PBB SHADOW E&M-EST. PATIENT-LVL III: CPT | Mod: PBBFAC,,, | Performed by: ADVANCED PRACTICE MIDWIFE

## 2019-08-14 PROCEDURE — 99213 OFFICE O/P EST LOW 20 MIN: CPT | Mod: PBBFAC,25,27,TH | Performed by: ADVANCED PRACTICE MIDWIFE

## 2019-08-14 PROCEDURE — 99999 PR PBB SHADOW E&M-EST. PATIENT-LVL I: ICD-10-PCS | Mod: PBBFAC,,,

## 2019-08-14 PROCEDURE — 99999 PR PBB SHADOW E&M-EST. PATIENT-LVL III: ICD-10-PCS | Mod: PBBFAC,,, | Performed by: ADVANCED PRACTICE MIDWIFE

## 2019-08-14 PROCEDURE — 99213 OFFICE O/P EST LOW 20 MIN: CPT | Mod: TH,S$PBB,, | Performed by: ADVANCED PRACTICE MIDWIFE

## 2019-08-14 PROCEDURE — 90471 IMMUNIZATION ADMIN: CPT | Mod: PBBFAC

## 2019-08-14 PROCEDURE — 99999 PR PBB SHADOW E&M-EST. PATIENT-LVL I: CPT | Mod: PBBFAC,,,

## 2019-08-14 PROCEDURE — 99211 OFF/OP EST MAY X REQ PHY/QHP: CPT | Mod: PBBFAC,25

## 2019-08-17 NOTE — PROGRESS NOTES
34 y.o. female  at 38w5d   Reports + FM,reinforced BID denies VB, LOF or regular CTX  Doing well without concerns   TW lbs   Reviewed warning signs, normal FKCs, labor precautions and how/when to call.  RTC x 1 wks, call or present sooner prn.

## 2019-08-20 ENCOUNTER — ROUTINE PRENATAL (OUTPATIENT)
Dept: OBSTETRICS AND GYNECOLOGY | Facility: CLINIC | Age: 34
End: 2019-08-20
Payer: MEDICAID

## 2019-08-20 VITALS
BODY MASS INDEX: 24.12 KG/M2 | SYSTOLIC BLOOD PRESSURE: 102 MMHG | DIASTOLIC BLOOD PRESSURE: 60 MMHG | WEIGHT: 140.56 LBS

## 2019-08-20 DIAGNOSIS — Z34.83 ENCOUNTER FOR SUPERVISION OF OTHER NORMAL PREGNANCY IN THIRD TRIMESTER: Primary | ICD-10-CM

## 2019-08-20 DIAGNOSIS — Z78.9 BREASTFEEDING (INFANT): ICD-10-CM

## 2019-08-20 PROCEDURE — 99213 OFFICE O/P EST LOW 20 MIN: CPT | Mod: S$PBB,TH,, | Performed by: ADVANCED PRACTICE MIDWIFE

## 2019-08-20 PROCEDURE — 99999 PR PBB SHADOW E&M-EST. PATIENT-LVL II: ICD-10-PCS | Mod: PBBFAC,,, | Performed by: ADVANCED PRACTICE MIDWIFE

## 2019-08-20 PROCEDURE — 99999 PR PBB SHADOW E&M-EST. PATIENT-LVL II: CPT | Mod: PBBFAC,,, | Performed by: ADVANCED PRACTICE MIDWIFE

## 2019-08-20 PROCEDURE — 99213 PR OFFICE/OUTPT VISIT, EST, LEVL III, 20-29 MIN: ICD-10-PCS | Mod: S$PBB,TH,, | Performed by: ADVANCED PRACTICE MIDWIFE

## 2019-08-20 PROCEDURE — 99212 OFFICE O/P EST SF 10 MIN: CPT | Mod: PBBFAC,TH | Performed by: ADVANCED PRACTICE MIDWIFE

## 2019-08-20 NOTE — PROGRESS NOTES
Doing well today  Alone today  C/O occasionnal mild ctx.  Denies VB or srom  Wants VE today  Reviewed S/S of labor and when to call

## 2019-08-23 ENCOUNTER — HOSPITAL ENCOUNTER (INPATIENT)
Facility: OTHER | Age: 34
LOS: 2 days | Discharge: HOME | End: 2019-08-25
Attending: OBSTETRICS & GYNECOLOGY | Admitting: OBSTETRICS & GYNECOLOGY
Payer: MEDICAID

## 2019-08-23 ENCOUNTER — ANESTHESIA EVENT (OUTPATIENT)
Dept: OBSTETRICS AND GYNECOLOGY | Facility: OTHER | Age: 34
End: 2019-08-23
Payer: MEDICAID

## 2019-08-23 ENCOUNTER — ANESTHESIA (OUTPATIENT)
Dept: OBSTETRICS AND GYNECOLOGY | Facility: OTHER | Age: 34
End: 2019-08-23
Payer: MEDICAID

## 2019-08-23 DIAGNOSIS — Z37.9 NORMAL LABOR: Primary | ICD-10-CM

## 2019-08-23 DIAGNOSIS — Z3A.39 39 WEEKS GESTATION OF PREGNANCY: ICD-10-CM

## 2019-08-23 DIAGNOSIS — O47.9 UTERINE CONTRACTIONS DURING PREGNANCY: ICD-10-CM

## 2019-08-23 PROBLEM — Z34.03 ENCOUNTER FOR SUPERVISION OF NORMAL FIRST PREGNANCY IN THIRD TRIMESTER: Status: RESOLVED | Noted: 2019-06-04 | Resolved: 2019-08-23

## 2019-08-23 LAB
ABO + RH BLD: NORMAL
BASOPHILS # BLD AUTO: 0.06 K/UL (ref 0–0.2)
BASOPHILS NFR BLD: 0.4 % (ref 0–1.9)
BLD GP AB SCN CELLS X3 SERPL QL: NORMAL
DIFFERENTIAL METHOD: ABNORMAL
EOSINOPHIL # BLD AUTO: 0 K/UL (ref 0–0.5)
EOSINOPHIL NFR BLD: 0.3 % (ref 0–8)
ERYTHROCYTE [DISTWIDTH] IN BLOOD BY AUTOMATED COUNT: 13.9 % (ref 11.5–14.5)
HCT VFR BLD AUTO: 33.4 % (ref 37–48.5)
HGB BLD-MCNC: 11.2 G/DL (ref 12–16)
IMM GRANULOCYTES # BLD AUTO: 0.14 K/UL (ref 0–0.04)
IMM GRANULOCYTES NFR BLD AUTO: 0.9 % (ref 0–0.5)
LYMPHOCYTES # BLD AUTO: 1.6 K/UL (ref 1–4.8)
LYMPHOCYTES NFR BLD: 10.1 % (ref 18–48)
MCH RBC QN AUTO: 28.5 PG (ref 27–31)
MCHC RBC AUTO-ENTMCNC: 33.5 G/DL (ref 32–36)
MCV RBC AUTO: 85 FL (ref 82–98)
MONOCYTES # BLD AUTO: 0.4 K/UL (ref 0.3–1)
MONOCYTES NFR BLD: 2.9 % (ref 4–15)
NEUTROPHILS # BLD AUTO: 13.1 K/UL (ref 1.8–7.7)
NEUTROPHILS NFR BLD: 85.4 % (ref 38–73)
NRBC BLD-RTO: 0 /100 WBC
PLATELET # BLD AUTO: 286 K/UL (ref 150–350)
PMV BLD AUTO: 9.8 FL (ref 9.2–12.9)
RBC # BLD AUTO: 3.93 M/UL (ref 4–5.4)
WBC # BLD AUTO: 15.3 K/UL (ref 3.9–12.7)

## 2019-08-23 PROCEDURE — 27800517 HC TRAY,EPIDURAL-CONTINUOUS: Performed by: ANESTHESIOLOGY

## 2019-08-23 PROCEDURE — 59409 PR OBSTETRICAL CARE,VAG DELIV ONLY: ICD-10-PCS | Mod: GB,,, | Performed by: MIDWIFE

## 2019-08-23 PROCEDURE — S0020 INJECTION, BUPIVICAINE HYDRO: HCPCS | Performed by: ANESTHESIOLOGY

## 2019-08-23 PROCEDURE — 99282 EMERGENCY DEPT VISIT SF MDM: CPT | Mod: 25,,, | Performed by: OBSTETRICS & GYNECOLOGY

## 2019-08-23 PROCEDURE — 72200004 HC VAGINAL DELIVERY LEVEL I

## 2019-08-23 PROCEDURE — 27200710 HC EPIDURAL INFUSION PUMP SET: Performed by: ANESTHESIOLOGY

## 2019-08-23 PROCEDURE — 59409 OBSTETRICAL CARE: CPT | Mod: GB,,, | Performed by: MIDWIFE

## 2019-08-23 PROCEDURE — 99285 EMERGENCY DEPT VISIT HI MDM: CPT | Mod: 25

## 2019-08-23 PROCEDURE — 59409 OBSTETRICAL CARE: CPT | Mod: AA,,, | Performed by: ANESTHESIOLOGY

## 2019-08-23 PROCEDURE — 99282 PR EMERGENCY DEPT VISIT,LEVEL II: ICD-10-PCS | Mod: 25,,, | Performed by: OBSTETRICS & GYNECOLOGY

## 2019-08-23 PROCEDURE — 85025 COMPLETE CBC W/AUTO DIFF WBC: CPT

## 2019-08-23 PROCEDURE — 72100002 HC LABOR CARE, 1ST 8 HOURS

## 2019-08-23 PROCEDURE — 59409 PRA ETRICAL CARE,VAG DELIV ONLY: ICD-10-PCS | Mod: AA,,, | Performed by: ANESTHESIOLOGY

## 2019-08-23 PROCEDURE — 59025 PR FETAL 2N-STRESS TEST: ICD-10-PCS | Mod: 26,,, | Performed by: OBSTETRICS & GYNECOLOGY

## 2019-08-23 PROCEDURE — 86901 BLOOD TYPING SEROLOGIC RH(D): CPT

## 2019-08-23 PROCEDURE — 63600175 PHARM REV CODE 636 W HCPCS: Performed by: ANESTHESIOLOGY

## 2019-08-23 PROCEDURE — 51702 INSERT TEMP BLADDER CATH: CPT

## 2019-08-23 PROCEDURE — 62326 NJX INTERLAMINAR LMBR/SAC: CPT | Performed by: STUDENT IN AN ORGANIZED HEALTH CARE EDUCATION/TRAINING PROGRAM

## 2019-08-23 PROCEDURE — 63600175 PHARM REV CODE 636 W HCPCS: Performed by: MIDWIFE

## 2019-08-23 PROCEDURE — 25000003 PHARM REV CODE 250: Performed by: ANESTHESIOLOGY

## 2019-08-23 PROCEDURE — 59025 FETAL NON-STRESS TEST: CPT

## 2019-08-23 PROCEDURE — 11000001 HC ACUTE MED/SURG PRIVATE ROOM

## 2019-08-23 PROCEDURE — 59025 FETAL NON-STRESS TEST: CPT | Mod: 26,,, | Performed by: OBSTETRICS & GYNECOLOGY

## 2019-08-23 RX ORDER — DOCUSATE SODIUM 100 MG/1
200 CAPSULE, LIQUID FILLED ORAL 2 TIMES DAILY PRN
Status: DISCONTINUED | OUTPATIENT
Start: 2019-08-23 | End: 2019-08-25 | Stop reason: HOSPADM

## 2019-08-23 RX ORDER — ONDANSETRON 8 MG/1
8 TABLET, ORALLY DISINTEGRATING ORAL EVERY 8 HOURS PRN
Status: DISCONTINUED | OUTPATIENT
Start: 2019-08-23 | End: 2019-08-25 | Stop reason: HOSPADM

## 2019-08-23 RX ORDER — OXYTOCIN/RINGER'S LACTATE 20/1000 ML
333 PLASTIC BAG, INJECTION (ML) INTRAVENOUS CONTINUOUS
Status: DISPENSED | OUTPATIENT
Start: 2019-08-23 | End: 2019-08-23

## 2019-08-23 RX ORDER — BUPIVACAINE HYDROCHLORIDE 2.5 MG/ML
INJECTION, SOLUTION EPIDURAL; INFILTRATION; INTRACAUDAL
Status: DISPENSED
Start: 2019-08-23 | End: 2019-08-23

## 2019-08-23 RX ORDER — ONDANSETRON 8 MG/1
8 TABLET, ORALLY DISINTEGRATING ORAL EVERY 8 HOURS PRN
Status: DISCONTINUED | OUTPATIENT
Start: 2019-08-23 | End: 2019-08-23

## 2019-08-23 RX ORDER — FAMOTIDINE 10 MG/ML
20 INJECTION INTRAVENOUS ONCE
Status: CANCELLED | OUTPATIENT
Start: 2019-08-23 | End: 2019-08-23

## 2019-08-23 RX ORDER — SODIUM CHLORIDE 9 MG/ML
INJECTION, SOLUTION INTRAVENOUS
Status: DISCONTINUED | OUTPATIENT
Start: 2019-08-23 | End: 2019-08-24

## 2019-08-23 RX ORDER — HYDROCORTISONE 25 MG/G
CREAM TOPICAL 3 TIMES DAILY PRN
Status: DISCONTINUED | OUTPATIENT
Start: 2019-08-23 | End: 2019-08-25 | Stop reason: HOSPADM

## 2019-08-23 RX ORDER — IBUPROFEN 600 MG/1
600 TABLET ORAL EVERY 6 HOURS PRN
Status: DISCONTINUED | OUTPATIENT
Start: 2019-08-23 | End: 2019-08-25 | Stop reason: HOSPADM

## 2019-08-23 RX ORDER — MISOPROSTOL 200 UG/1
600 TABLET ORAL
Status: DISCONTINUED | OUTPATIENT
Start: 2019-08-23 | End: 2019-08-24

## 2019-08-23 RX ORDER — DIPHENHYDRAMINE HYDROCHLORIDE 50 MG/ML
25 INJECTION INTRAMUSCULAR; INTRAVENOUS EVERY 4 HOURS PRN
Status: DISCONTINUED | OUTPATIENT
Start: 2019-08-23 | End: 2019-08-25 | Stop reason: HOSPADM

## 2019-08-23 RX ORDER — SODIUM CITRATE AND CITRIC ACID MONOHYDRATE 334; 500 MG/5ML; MG/5ML
30 SOLUTION ORAL ONCE
Status: CANCELLED | OUTPATIENT
Start: 2019-08-23 | End: 2019-08-23

## 2019-08-23 RX ORDER — FENTANYL/BUPIVACAINE/NS/PF 2MCG/ML-.1
PLASTIC BAG, INJECTION (ML) INJECTION CONTINUOUS
Status: CANCELLED | OUTPATIENT
Start: 2019-08-23

## 2019-08-23 RX ORDER — FENTANYL CITRATE 50 UG/ML
INJECTION, SOLUTION INTRAMUSCULAR; INTRAVENOUS
Status: DISCONTINUED | OUTPATIENT
Start: 2019-08-23 | End: 2019-08-23

## 2019-08-23 RX ORDER — BUPIVACAINE HYDROCHLORIDE 2.5 MG/ML
INJECTION, SOLUTION INFILTRATION; PERINEURAL
Status: COMPLETED | OUTPATIENT
Start: 2019-08-23 | End: 2019-08-23

## 2019-08-23 RX ORDER — FENTANYL/BUPIVACAINE/NS/PF 2MCG/ML-.1
PLASTIC BAG, INJECTION (ML) INJECTION
Status: DISPENSED
Start: 2019-08-23 | End: 2019-08-23

## 2019-08-23 RX ORDER — SODIUM CHLORIDE, SODIUM LACTATE, POTASSIUM CHLORIDE, CALCIUM CHLORIDE 600; 310; 30; 20 MG/100ML; MG/100ML; MG/100ML; MG/100ML
INJECTION, SOLUTION INTRAVENOUS CONTINUOUS
Status: DISCONTINUED | OUTPATIENT
Start: 2019-08-23 | End: 2019-08-24

## 2019-08-23 RX ORDER — FENTANYL CITRATE 50 UG/ML
INJECTION, SOLUTION INTRAMUSCULAR; INTRAVENOUS
Status: COMPLETED
Start: 2019-08-23 | End: 2019-08-23

## 2019-08-23 RX ORDER — DIPHENHYDRAMINE HCL 25 MG
25 CAPSULE ORAL EVERY 4 HOURS PRN
Status: DISCONTINUED | OUTPATIENT
Start: 2019-08-23 | End: 2019-08-25 | Stop reason: HOSPADM

## 2019-08-23 RX ORDER — OXYTOCIN/RINGER'S LACTATE 20/1000 ML
41.7 PLASTIC BAG, INJECTION (ML) INTRAVENOUS CONTINUOUS
Status: ACTIVE | OUTPATIENT
Start: 2019-08-23 | End: 2019-08-23

## 2019-08-23 RX ORDER — ACETAMINOPHEN 325 MG/1
650 TABLET ORAL EVERY 6 HOURS PRN
Status: DISCONTINUED | OUTPATIENT
Start: 2019-08-23 | End: 2019-08-25 | Stop reason: HOSPADM

## 2019-08-23 RX ORDER — OXYTOCIN/RINGER'S LACTATE 20/1000 ML
41.65 PLASTIC BAG, INJECTION (ML) INTRAVENOUS CONTINUOUS
Status: ACTIVE | OUTPATIENT
Start: 2019-08-23 | End: 2019-08-24

## 2019-08-23 RX ORDER — HYDROCODONE BITARTRATE AND ACETAMINOPHEN 5; 325 MG/1; MG/1
1 TABLET ORAL EVERY 4 HOURS PRN
Status: DISCONTINUED | OUTPATIENT
Start: 2019-08-23 | End: 2019-08-25 | Stop reason: HOSPADM

## 2019-08-23 RX ADMIN — SODIUM CHLORIDE, SODIUM LACTATE, POTASSIUM CHLORIDE, AND CALCIUM CHLORIDE: .6; .31; .03; .02 INJECTION, SOLUTION INTRAVENOUS at 08:08

## 2019-08-23 RX ADMIN — BUPIVACAINE HYDROCHLORIDE 8 ML: 2.5 INJECTION, SOLUTION INFILTRATION; PERINEURAL at 09:08

## 2019-08-23 RX ADMIN — Medication 41.7 MILLI-UNITS/MIN: at 12:08

## 2019-08-23 RX ADMIN — FENTANYL CITRATE 100 MCG: 50 INJECTION, SOLUTION INTRAMUSCULAR; INTRAVENOUS at 09:08

## 2019-08-23 RX ADMIN — SODIUM CHLORIDE, SODIUM LACTATE, POTASSIUM CHLORIDE, AND CALCIUM CHLORIDE 1000 ML: .6; .31; .03; .02 INJECTION, SOLUTION INTRAVENOUS at 08:08

## 2019-08-23 NOTE — HOSPITAL COURSE
Admit for labor management  Pt desires an epidural  Pt desires PPBTL  Expectant management  Anticipate     19 PP day #1, normal  19: PPD #2 s/p  expected course, ready for discharge, breastfeeding. Desires pp BTL-info given and consult to be scheduled.

## 2019-08-23 NOTE — L&D DELIVERY NOTE
[facility]  Vaginal Delivery   Operative Note    SUMMARY     Normal spontaneous vaginal delivery of live infant, skin to skin was unable to be performed due to meconium stained fluid and no respiratory effort.  Infant delivered position OA over intact perineum.  Nuchal cord: No.    Spontaneous delivery of placenta and IV pitocin given noting good uterine tone.  No lacerations noted.  Patient tolerated delivery well. lap counted correctly x2.    Indications:  (spontaneous vaginal delivery)  Pregnancy complicated by:   Patient Active Problem List   Diagnosis    History of depression    Pilonidal cyst with abscess (I&D during pregnancy)     (spontaneous vaginal delivery)    Delivery outcome of single liveborn infant     Admitting GA: 39w4d    Delivery Information for  Fernando Cedillo    Birth information:  YOB: 2017   Time of birth: 12:53 PM   Sex: male   Head Delivery Date/Time: 2019 12:53 PM   Delivery type: Vaginal, Spontaneous   Gestational Age: 39w4d    Delivery Providers    Delivering clinician:  Faisal David CNM   Provider Role    Rakel Pulido RN     Gregor Macdonald RN             Measurements    Weight:  3374 g  Length:  54.6 cm  Head circumference:  34.3 cm  Chest circumference:  33.7 cm         Apgars    Living status:  Living  Apgars:   1 min.:   5 min.:   10 min.:   15 min.:   20 min.:     Skin color:   0  1       Heart rate:   2  2       Reflex irritability:   2  2       Muscle tone:   2  2       Respiratory effort:   2  2       Total:   8  9       Apgars assigned by:  NICU         Operative Delivery    Forceps attempted?:  No  Vacuum extractor attempted?:  No         Shoulder Dystocia    Shoulder dystocia present?:  No           Presentation    Presentation:  Vertex  Position:  Middle Occiput Anterior           Interventions/Resuscitation    Method:  NICU Attended       Cord    Vessels:  3 vessels  Complications:  None  Delayed Cord  Clamping?:  No  Cord Clamped Date/Time:  2019 12:54 PM  Cord Blood Disposition:  Sent with Baby  Gases Sent?:  No  Stem Cell Collection (by MD):  No       Placenta    Placenta delivery date/time:  2019 1256  Placenta removal:  Spontaneous  Placenta appearance:  Intact  Placenta disposition:  discarded           Labor Events:       labor:       Labor Onset Date/Time:         Dilation Complete Date/Time:         Start Pushing Date/Time:       Rupture Date/Time:              Rupture type:           Fluid Amount:        Fluid Color:        Fluid Odor:        Membrane Status (PeriCalm): ARM (Artificial Rupture)      Rupture Date/Time (PeriCalm): 2019 11:20:00      Fluid Amount (PeriCalm): Moderate      Fluid Color (PeriCalm): Meconium Stained       steroids:       Antibiotics given for GBS:       Induction:       Indications for induction:        Augmentation: amniotomy     Indications for augmentation:       Labor complications: None     Additional complications:          Cervical ripening:                     Delivery:      Episiotomy: None     Indication for Episiotomy:       Perineal Lacerations: None Repaired:      Periurethral Laceration:   Repaired:     Labial Laceration:   Repaired:     Sulcus Laceration:   Repaired:     Vaginal Laceration:   Repaired:     Cervical Laceration:   Repaired:     Repair suture: None     Repair # of packets:       Last Value - EBL - Nursing (mL): 50     Sum - EBL - Nursing (mL): 50     Last Value - EBL - Anesthesia (mL):      Calculated QBL (mL):        Vaginal Sweep Performed: Yes     Surgicount Correct:         Other providers:       Anesthesia    Method:  Epidural          Details (if applicable):  Trial of Labor      Categorization:      Priority:     Indications for :     Incision Type:       Additional  information:  Forceps:    Vacuum:    Breech:    Observed anomalies    Other (Comments):

## 2019-08-23 NOTE — ED TRIAGE NOTES
Pt presents with a c/o of contractions.  TRUNG Malone notified of arrival.  Pt to [facility] for evaluations.

## 2019-08-23 NOTE — H&P
[facility]  Obstetrics  History & Physical    Patient Name: Katey Cedillo  MRN: [id number]  Admission Date: 2019  Primary Care Provider: Sri Avendano CNM    Subjective:     Principal Problem:Normal labor    History of Present Illness:  , presents to [facility] w/ c/o ctx's all night    Obstetric HPI:  Patient reports contractions, active fetal movement, No vaginal bleeding , No loss of fluid     This pregnancy has been complicated by none    OB History    Para Term  AB Living   2 1 1 0 0 1   SAB TAB Ectopic Multiple Live Births   0 0 0 0 1      # Outcome Date GA Lbr Kaden/2nd Weight Sex Delivery Anes PTL Lv   2 Current            1 Term 10/22/13 38w0d  2.892 kg (6 lb 6 oz) F Vag-Spont EPI N YUMIKO      Birth Comments: water broke evening       Name: René     Past Medical History:   Diagnosis Date    Depression     --alternative treatments ( hospitalization x 1)     Pilonidal cyst with abscess 2019    I&D  during pregnancy     No past surgical history on file.    PTA Medications   Medication Sig    ferrous sulfate 324 mg (65 mg iron) TbEC Take 1 tablet (324 mg total) by mouth once daily.    prenatal vit,calc76/iron/folic (PNV 29-1 ORAL) Take 1 tablet by mouth once daily.       Review of patient's allergies indicates:  No Known Allergies     Family History     Problem Relation (Age of Onset)    Breast cancer Paternal Grandmother, Cousin        Tobacco Use    Smoking status: Never Smoker    Smokeless tobacco: Never Used   Substance and Sexual Activity    Alcohol use: Not Currently    Drug use: Never    Sexual activity: Yes     Partners: Male     Comment: Marqukirill     Review of Systems   Constitutional: Positive for diaphoresis.   Gastrointestinal: Positive for abdominal pain.   Genitourinary: Positive for vaginal discharge. Negative for vaginal bleeding.   Musculoskeletal: Positive for back pain.   Neurological: Negative for headaches.      Objective:     Vital Signs  (Most Recent):  Temp: 97.8 °F (36.6 °C) (19)  Pulse: 78 (19)  Resp: 18 (19)  BP: 102/60 (19)  SpO2: 100 % (19) Vital Signs (24h Range):  Temp:  [97.8 °F (36.6 °C)] 97.8 °F (36.6 °C)  Pulse:  [78-81] 78  Resp:  [18] 18  SpO2:  [100 %] 100 %  BP: (102)/(60) 102/60        There is no height or weight on file to calculate BMI.    FHT: Cat 1 (reassuring)  TOCO:  Q 2-4 minutes    Physical Exam:   Constitutional: She is oriented to person, place, and time. She appears well-developed and well-nourished.    HENT:   Head: Normocephalic.     Neck: Normal range of motion.     Pulmonary/Chest: Effort normal.        Abdominal: Soft.     Genitourinary: Uterus normal. Vaginal discharge found.   Genitourinary Comments: mucousy discharge           Musculoskeletal: Normal range of motion.       Neurological: She is alert and oriented to person, place, and time.    Skin: Skin is warm and dry.    Psychiatric: She has a normal mood and affect. Her behavior is normal. Judgment and thought content normal.       Cervix:  Dilation:  7  Effacement:  100%  Station: 0  Presentation: Vertex     Significant Labs:  Lab Results   Component Value Date    GROUPTRH B POS 2019    HEPBSAG Negative 2019    STREPBCULT No Group B Streptococcus isolated 2019       I have personallly reviewed all pertinent lab results from the last 24 hours.  Recent Lab Results     None        Assessment/Plan:     34 y.o. female  at 39w4d for:    * Normal labor  Admit  KATLIN David CNM  Obstetrics  [facility]

## 2019-08-23 NOTE — PROGRESS NOTES
"LABOR NOTE    S:  Pt resting comfortably with epidural, no complaints.  Family at bedside and supportive.     O: BP (!) 96/55   Pulse 71   Temp 98.1 °F (36.7 °C)   Resp 18   Ht 5' 4" (1.626 m)   Wt 63.7 kg (140 lb 8.7 oz)   LMP 2018 (Exact Date)   SpO2 100%   Breastfeeding? No   BMI 24.12 kg/m²     GENERAL: Calm and appropriate affect  NEURO: Alert, oriented, normal speech  ABDOMEN: Nontender, Fundus palpates soft between UC's.  FHT: Baseline 135, moderate BTBV, positive accels, no decels. Cat 1, reassuring.  CTX: q 3-4 minutes  SVE: 9/100/0, AROM, light meconium, mod amt, no foul odor      ASSESSMENT:   34 y.o.  IUP at 39w4d, FHT reassuring/ Cat 1    Patient Active Problem List   Diagnosis    Encounter for supervision of normal first pregnancy in third trimester    History of depression    Pilonidal cyst with abscess (I&D during pregnancy)    Normal labor         PLAN:  Continue close Maternal/Fetal monitoring  Recheck 1 hours or PRN      "

## 2019-08-23 NOTE — SUBJECTIVE & OBJECTIVE
Obstetric HPI:  Patient reports contractions, active fetal movement, No vaginal bleeding , No loss of fluid     This pregnancy has been complicated by none    OB History    Para Term  AB Living   2 1 1 0 0 1   SAB TAB Ectopic Multiple Live Births   0 0 0 0 1      # Outcome Date GA Lbr Kaden/2nd Weight Sex Delivery Anes PTL Lv   2 Current            1 Term 10/22/13 38w0d  2.892 kg (6 lb 6 oz) F Vag-Spont EPI N YUMIKO      Birth Comments: water broke evening       Name: René     Past Medical History:   Diagnosis Date    Depression     --alternative treatments ( hospitalization x 1)     Pilonidal cyst with abscess 2019    I&D  during pregnancy     No past surgical history on file.    PTA Medications   Medication Sig    ferrous sulfate 324 mg (65 mg iron) TbEC Take 1 tablet (324 mg total) by mouth once daily.    prenatal vit,calc76/iron/folic (PNV 29-1 ORAL) Take 1 tablet by mouth once daily.       Review of patient's allergies indicates:  No Known Allergies     Family History     Problem Relation (Age of Onset)    Breast cancer Paternal Grandmother, Cousin        Tobacco Use    Smoking status: Never Smoker    Smokeless tobacco: Never Used   Substance and Sexual Activity    Alcohol use: Not Currently    Drug use: Never    Sexual activity: Yes     Partners: Male     Comment: Marqukirill     Review of Systems   Constitutional: Positive for diaphoresis.   Gastrointestinal: Positive for abdominal pain.   Genitourinary: Positive for vaginal discharge. Negative for vaginal bleeding.   Musculoskeletal: Positive for back pain.   Neurological: Negative for headaches.      Objective:     Vital Signs (Most Recent):  Temp: 97.8 °F (36.6 °C) (19)  Pulse: 78 (19)  Resp: 18 (19)  BP: 102/60 (19 08)  SpO2: 100 % (19) Vital Signs (24h Range):  Temp:  [97.8 °F (36.6 °C)] 97.8 °F (36.6 °C)  Pulse:  [78-81] 78  Resp:  [18] 18  SpO2:  [100 %] 100 %  BP: (102)/(60)  102/60        There is no height or weight on file to calculate BMI.    FHT: Cat 1 (reassuring)  TOCO:  Q 2-4 minutes    Physical Exam:   Constitutional: She is oriented to person, place, and time. She appears well-developed and well-nourished.    HENT:   Head: Normocephalic.     Neck: Normal range of motion.     Pulmonary/Chest: Effort normal.        Abdominal: Soft.     Genitourinary: Uterus normal. Vaginal discharge found.   Genitourinary Comments: mucousy discharge           Musculoskeletal: Normal range of motion.       Neurological: She is alert and oriented to person, place, and time.    Skin: Skin is warm and dry.    Psychiatric: She has a normal mood and affect. Her behavior is normal. Judgment and thought content normal.       Cervix:  Dilation:  7  Effacement:  100%  Station: 0  Presentation: Vertex     Significant Labs:  Lab Results   Component Value Date    GROUPTRH B POS 03/26/2019    HEPBSAG Negative 03/26/2019    STREPBCULT No Group B Streptococcus isolated 07/30/2019       I have personallly reviewed all pertinent lab results from the last 24 hours.  Recent Lab Results     None

## 2019-08-23 NOTE — ED PROVIDER NOTES
Encounter Date: 8/23/2019       History     Chief Complaint   Patient presents with    Contractions     Pt presents to [facility] w/ c/o ctx's all night, denies VB or LOF        Review of patient's allergies indicates:  No Known Allergies  Past Medical History:   Diagnosis Date    Depression     2011--alternative treatments ( hospitalization x 1)     Pilonidal cyst with abscess 2019    I&D  during pregnancy     No past surgical history on file.  Family History   Problem Relation Age of Onset    Breast cancer Paternal Grandmother         > 51 y/o at diagnosis    Breast cancer Cousin         40's at diagnosis    Hypertension Neg Hx     Cancer Neg Hx     Ovarian cancer Neg Hx     Colon cancer Neg Hx      Social History     Tobacco Use    Smoking status: Never Smoker    Smokeless tobacco: Never Used   Substance Use Topics    Alcohol use: Not Currently    Drug use: Never     Review of Systems   Gastrointestinal: Positive for abdominal pain.   Genitourinary: Positive for vaginal discharge and vaginal pain. Negative for vaginal bleeding.   Musculoskeletal: Positive for back pain.       Physical Exam     Initial Vitals [08/23/19 0817]   BP Pulse Resp Temp SpO2   102/60 81 -- -- --      MAP       --         Physical Exam    Nursing note and vitals reviewed.  Constitutional: She appears well-developed and well-nourished.   HENT:   Head: Normocephalic.   Neck: Normal range of motion.   Genitourinary: Uterus normal. Vaginal discharge found.   Genitourinary Comments: mucousy discharge   Musculoskeletal: Normal range of motion.   Neurological: She is alert and oriented to person, place, and time.   Skin: Skin is warm and dry.   Psychiatric: She has a normal mood and affect. Her behavior is normal. Judgment and thought content normal.     OB LABOR EXAM:   Pre-Term Labor: No.   Membranes ruptured: No.   Method: Sterile vaginal exam per MD.   Vaginal Bleeding: none present.   Engagement: engaged.   Dilatation: 7.   Station:  0.   Effacement: 100%.             ED Course   Obtain Fetal nonstress test (NST)  Date/Time: 8/23/2019 8:27 AM  Performed by: Faisal David CNM  Authorized by: Faisal David CNM     Nonstress Test:     Variability:  6-25 BPM    Decelerations:  None    Accelerations:  15 bpm    Baseline:  140    Uterine Irritability: No      Contractions:  Regular    Contraction Frequency:  2-4  Biophysical Profile:     Nonstress Test Interpretation: reactive      Overall Impression:  Reassuring  Post-procedure:     Patient tolerance:  Patient tolerated the procedure well with no immediate complications      Labs Reviewed - No data to display       Imaging Results    None                               Clinical Impression:       ICD-10-CM ICD-9-CM   1. Normal labor O80 650    Z37.9    2. 39 weeks gestation of pregnancy Z3A.39 V22.2   3. Uterine contractions during pregnancy O62.2 661.20         Disposition:   Disposition: Admitted  Condition: Stable                        Faisal David CNM  08/23/19 0829

## 2019-08-23 NOTE — ANESTHESIA PROCEDURE NOTES
Epidural    Patient location during procedure: OB   Reason for block: primary anesthetic   Diagnosis: Active Labor   Start time: 8/23/2019 9:00 AM  Timeout: 8/23/2019 9:00 AM  End time: 8/23/2019 9:15 AM  Surgery related to: Vaginal Delivery    Staffing  Performing Provider: Krystal Cui MD  Authorizing Provider: Dewey Tavarez MD        Preanesthetic Checklist  Completed: patient identified, surgical consent, pre-op evaluation, timeout performed, IV checked, risks and benefits discussed, monitors and equipment checked, anesthesia consent given, hand hygiene performed and patient being monitored  Preparation  Patient position: sitting  Prep: ChloraPrep  Patient monitoring: Pulse Ox and Blood Pressure  Epidural  Skin Anesthetic: lidocaine 1%  Skin Wheal: 3 mL  Administration type: continuous  Approach: midline  Interspace: L3-4    Injection technique: DENILSON saline  Needle and Epidural Catheter  Needle type: Tuohy   Needle gauge: 17  Needle length: 3.5 inches  Needle insertion depth: 6.5 cm  Catheter type: springwound and multi-orifice  Catheter size: 19 G  Catheter at skin depth: 11 cm  Test dose: 3 mL of lidocaine 1.5% with Epi 1-to-200,000  Additional Documentation: incremental injection, negative aspiration for heme and CSF, no paresthesia on injection, no signs/symptoms of IV or SA injection, no significant pain on injection and no significant complaints from patient  Needle localization: anatomical landmarks  Medications:  Volume per aspiration: 4 mL  Time between aspirations: 5 minutes  Assessment  Ease of block: easy  Patient's tolerance of the procedure: comfortable throughout block and no complaintsNo inadvertent dural puncture with Tuohy.  Dural puncture performed with spinal needle.

## 2019-08-23 NOTE — PROGRESS NOTES
Pt transferred from ldr 11 to mbu rm 614 via wheelchair. Pt oriented to room, call bell within reach. Baby and  at bedside. Report given to dennys bell rn

## 2019-08-24 PROCEDURE — 99238 HOSP IP/OBS DSCHRG MGMT 30/<: CPT | Mod: ,,, | Performed by: ADVANCED PRACTICE MIDWIFE

## 2019-08-24 PROCEDURE — 11000001 HC ACUTE MED/SURG PRIVATE ROOM

## 2019-08-24 PROCEDURE — 25000003 PHARM REV CODE 250: Performed by: MIDWIFE

## 2019-08-24 PROCEDURE — 99238 PR HOSPITAL DISCHARGE DAY,<30 MIN: ICD-10-PCS | Mod: ,,, | Performed by: ADVANCED PRACTICE MIDWIFE

## 2019-08-24 RX ADMIN — IBUPROFEN 600 MG: 600 TABLET, FILM COATED ORAL at 12:08

## 2019-08-24 RX ADMIN — IBUPROFEN 600 MG: 600 TABLET, FILM COATED ORAL at 05:08

## 2019-08-24 RX ADMIN — IBUPROFEN 600 MG: 600 TABLET, FILM COATED ORAL at 08:08

## 2019-08-24 NOTE — ANESTHESIA POSTPROCEDURE EVALUATION
Anesthesia Post Evaluation    Patient: Katey Cedillo    Procedure(s) Performed: * No procedures listed *    Final Anesthesia Type: epidural  Patient location during evaluation: labor & delivery  Patient participation: Yes- Able to Participate  Level of consciousness: awake and alert and oriented  Post-procedure vital signs: reviewed and stable  Pain management: adequate  Airway patency: patent  PONV status at discharge: No PONV  Anesthetic complications: no      Cardiovascular status: blood pressure returned to baseline  Respiratory status: unassisted  Hydration status: euvolemic  Follow-up not needed.          Vitals Value Taken Time   BP 95/51 8/24/2019  7:43 AM   Temp 36.9 °C (98.4 °F) 8/24/2019  7:43 AM   Pulse 82 8/24/2019  7:43 AM   Resp 18 8/24/2019  7:43 AM   SpO2 99 % 8/24/2019  7:43 AM         No case tracking events are documented in the log.      Pain/Noemy Score: Pain Rating Prior to Med Admin: 0 (8/24/2019 12:00 PM)

## 2019-08-25 VITALS
OXYGEN SATURATION: 100 % | DIASTOLIC BLOOD PRESSURE: 56 MMHG | HEART RATE: 65 BPM | SYSTOLIC BLOOD PRESSURE: 107 MMHG | BODY MASS INDEX: 24 KG/M2 | TEMPERATURE: 98 F | WEIGHT: 140.56 LBS | RESPIRATION RATE: 18 BRPM | HEIGHT: 64 IN

## 2019-08-25 RX ORDER — IBUPROFEN 600 MG/1
600 TABLET ORAL EVERY 6 HOURS PRN
Qty: 30 TABLET | Refills: 0 | Status: SHIPPED | OUTPATIENT
Start: 2019-08-25 | End: 2024-02-26

## 2019-08-25 NOTE — DISCHARGE SUMMARY
[facility]  Obstetrics  Discharge Summary      Patient Name: Katey Cedillo  MRN: [id number]  Admission Date: 2019  Hospital Length of Stay: 2 days  Discharge Date and Time:  2019 12:10 PM  Attending Physician: Edna Chaparro DO   Discharging Provider: Coral Hernandez CNM  Primary Care Provider: Sri Avendano CNM    HPI: , presents to [facility] w/ c/o ctx's all night    * No surgery found *     Hospital Course:   19  Admit for labor management  Pt desires an epidural  Pt desires PPBTL  Expectant management  Anticipate     19 PP day #1, normal  19 PPD #2 s/p  expected course, no complaints.     Review of Systems   Constitutional: Negative for chills and fever.   HENT: Negative.    Eyes: Negative for blurred vision.   Respiratory: Negative for cough and shortness of breath.    Cardiovascular: Negative for chest pain, palpitations and leg swelling.   Gastrointestinal: Negative for constipation, diarrhea, heartburn, nausea and vomiting.   Genitourinary: Negative for dysuria, frequency and urgency.   Musculoskeletal: Negative for back pain and neck pain.   Skin: Negative for rash.   Neurological: Negative for dizziness and headaches.   Endo/Heme/Allergies: Negative.    Psychiatric/Behavioral: Negative for depression. The patient is not nervous/anxious.        Final Active Diagnoses:    Diagnosis Date Noted POA    PRINCIPAL PROBLEM:   (spontaneous vaginal delivery) [O80] 2019 Not Applicable    Normal postpartum course [Z39.2] 2019 Not Applicable    Delivery outcome of single liveborn infant [Z37.0] 2019 Not Applicable      Problems Resolved During this Admission:    Diagnosis Date Noted Date Resolved POA    Normal labor [O80, Z37.9] 2019 Not Applicable          Physical Exam   Constitutional: She is oriented to person, place, and time and well-developed, well-nourished, and in no distress.   HENT:   Head: Normocephalic  and atraumatic.   Eyes: Conjunctivae are normal.   Neck: Normal range of motion. Neck supple.   Cardiovascular: Normal rate and intact distal pulses.   Pulmonary/Chest: Effort normal.   Normal chest rise, no evidence of labored breathing.  Breasts: Nipples without bleeding/ cracking;pink and soft, filling   Abdominal: Soft.   Fundus firm and u/2   Genitourinary:   Genitourinary Comments: Lochia scant rubra   Musculoskeletal: Normal range of motion.   Neurological: She is alert and oriented to person, place, and time. Gait normal.   Skin: Skin is warm and dry.   Psychiatric: Mood, memory, affect and judgment normal.       Feeding Method: breast    Immunizations     None          Delivery:    Episiotomy: None   Lacerations: None   Repair suture: None   Repair # of packets:     Blood loss (ml): 50     Birth information:  YOB: 2017   Time of birth: 12:53 PM   Sex: male   Delivery type: Vaginal, Spontaneous   Gestational Age: 39w4d    Delivery Clinician:      Other providers:       Additional  information:  Forceps:    Vacuum:    Breech:    Observed anomalies      Living?:           APGARS  One minute Five minutes Ten minutes   Skin color:         Heart rate:         Grimace:         Muscle tone:         Breathing:         Totals: 8  9        Placenta: Delivered:       appearance    Pending Diagnostic Studies:     None          Discharged Condition: good    Disposition: home    Follow Up:  Follow-up Information     [facility] 4.    Specialty:  Obstetrics and Gynecology  Why:  6 weeks for postpartum visit  Contact information:  [address]  [facility] [address] [phone]  Additional information:  [facility] 4th Floor   Please park elli Fagan Jr, MD. Schedule an appointment as soon as possible for a visit in 4 weeks.    Specialties:  Obstetrics, Obstetrics and Gynecology  Why:  [place]  Ligation consultation (if still desired)  Contact information:  [address] [facility] [address]  [facility] [address]  [phone]                 Patient Instructions:   No discharge procedures on file.  Medications:  Current Discharge Medication List      CONTINUE these medications which have NOT CHANGED    Details   ferrous sulfate 324 mg (65 mg iron) TbEC Take 1 tablet (324 mg total) by mouth once daily.  Refills: 0      prenatal vit,calc76/iron/folic (PNV 29-1 ORAL) Take 1 tablet by mouth once daily.           Desires PPBTL send for surgical consult at 4 weeks pp    Coral Hernandez CNM  Obstetrics  [facility]

## 2019-08-25 NOTE — PROGRESS NOTES
[facility]  Obstetrics  Postpartum Progress Note    Patient Name: Katey Cedillo  MRN: [id number]  Admission Date: 2019  Hospital Length of Stay: 2 days  Attending Physician: Edna Chaparro DO  Primary Care Provider: Sri Avendano CNM    Subjective:     Principal Problem: (spontaneous vaginal delivery)    Hospital course: Admit for labor management  Pt desires an epidural  Pt desires PPBTL  Expectant management  Anticipate     19 PP day #1, normal      Interval History: normal    She is doing well this morning. She is tolerating a regular diet without nausea or vomiting. She is voiding spontaneously. She is ambulating. She has passed flatus, and has not a BM. Vaginal bleeding is moderate. She denies fever or chills. Abdominal pain is mild and controlled with oral medications. She is breastfeeding.     Objective:     Vital Signs (Most Recent):  Temp: 98.5 °F (36.9 °C) (19 0000)  Pulse: 77 (19 0000)  Resp: 18 (19 0000)  BP: (!) 113/59 (19 0000)  SpO2: 99 % (19 0000) Vital Signs (24h Range):  Temp:  [98.3 °F (36.8 °C)-98.5 °F (36.9 °C)] 98.5 °F (36.9 °C)  Pulse:  [77-82] 77  Resp:  [18] 18  SpO2:  [99 %-100 %] 99 %  BP: ()/(51-59) 113/59     Weight: 63.7 kg (140 lb 8.7 oz)  Body mass index is 24.12 kg/m².    No intake or output data in the 24 hours ending 19 0647    Significant Labs:  Lab Results   Component Value Date    GROUPTRH B POS 2019    HEPBSAG Negative 2019    STREPBCULT No Group B Streptococcus isolated 2019     Recent Labs   Lab 19  0845   HGB 11.2*   HCT 33.4*       Beta HCG: No results for input(s): HCGQUALSERUM in the last 48 hours.  I have personallly reviewed all pertinent lab results from the last 24 hours.  Recent Lab Results     None          Physical Exam:   Constitutional: She is oriented to person, place, and time. She appears well-developed and well-nourished.    HENT:   Head:  Normocephalic.    Eyes: Pupils are equal, round, and reactive to light.    Neck: Normal range of motion.    Cardiovascular: Normal rate.     Pulmonary/Chest: Effort normal.        Abdominal: Soft.   FF @ U, midline and non-tender             Musculoskeletal: Normal range of motion and moves all extremeties.       Neurological: She is alert and oriented to person, place, and time.    Skin: Skin is warm and dry.    Psychiatric: She has a normal mood and affect.       Assessment/Plan:     34 y.o. female  for:    *  (spontaneous vaginal delivery)  Routine PP Care    Delivery outcome of single liveborn infant  Care of infant per PEDS dept        Disposition: As patient meets milestones, will plan to discharge 19.    Wendy D Gerhardt, CNM  Obstetrics  [facility]

## 2019-08-25 NOTE — SUBJECTIVE & OBJECTIVE
Hospital course: Admit for labor management  Pt desires an epidural  Pt desires PPBTL  Expectant management  Anticipate     19 PP day #1, normal      Interval History: normal    She is doing well this morning. She is tolerating a regular diet without nausea or vomiting. She is voiding spontaneously. She is ambulating. She has passed flatus, and has not a BM. Vaginal bleeding is moderate. She denies fever or chills. Abdominal pain is mild and controlled with oral medications. She is breastfeeding.     Objective:     Vital Signs (Most Recent):  Temp: 98.5 °F (36.9 °C) (19 0000)  Pulse: 77 (19 0000)  Resp: 18 (19 0000)  BP: (!) 113/59 (19 0000)  SpO2: 99 % (19 0000) Vital Signs (24h Range):  Temp:  [98.3 °F (36.8 °C)-98.5 °F (36.9 °C)] 98.5 °F (36.9 °C)  Pulse:  [77-82] 77  Resp:  [18] 18  SpO2:  [99 %-100 %] 99 %  BP: ()/(51-59) 113/59     Weight: 63.7 kg (140 lb 8.7 oz)  Body mass index is 24.12 kg/m².    No intake or output data in the 24 hours ending 19 0647    Significant Labs:  Lab Results   Component Value Date    GROUPTRH B POS 2019    HEPBSAG Negative 2019    STREPBCULT No Group B Streptococcus isolated 2019     Recent Labs   Lab 19  0845   HGB 11.2*   HCT 33.4*       Beta HCG: No results for input(s): HCGQUALSERUM in the last 48 hours.  I have personallly reviewed all pertinent lab results from the last 24 hours.  Recent Lab Results     None          Physical Exam:   Constitutional: She is oriented to person, place, and time. She appears well-developed and well-nourished.    HENT:   Head: Normocephalic.    Eyes: Pupils are equal, round, and reactive to light.    Neck: Normal range of motion.    Cardiovascular: Normal rate.     Pulmonary/Chest: Effort normal.        Abdominal: Soft.   FF @ U, midline and non-tender             Musculoskeletal: Normal range of motion and moves all extremeties.       Neurological: She is alert and  oriented to person, place, and time.    Skin: Skin is warm and dry.    Psychiatric: She has a normal mood and affect.

## 2019-08-25 NOTE — DISCHARGE INSTRUCTIONS
After a Vaginal Birth  After having a baby, your body may be very tired. It can take time to recover from a vaginal delivery. You may stay in the hospital or birth center from 1 to 4 days. In some cases, you may be able to go home the same day.    Right after the delivery  Your temperature and blood pressure will be taken until they are stable. A nurse or other healthcare provider will observe you as you rest. You may have afterbirth pains. These are cramps caused by the uterus shrinking. Sanitary pads are used to soak up the discharge of the uterine lining. To make sure that you arent bleeding too much, the pad will be checked. And the firmness of your uterus will be checked. To do this, a nurse will gently push down on your stomach. If you had anesthesia, youll be watched closely until you can feel and move your toes. If you have perineal pain (pain between the vagina and anus), an ice pack can help.   care  While still in the hospital or birth center, youll learn how to hold and feed your baby. You will also be given instructions on how to care for your baby. This includes bathing and feeding.   Preparing to go home  You may be anxious to go home as soon as possible. Before you and your baby go home, a healthcare provider will check to be sure you are healthy enough to take care of your baby and yourself. Youre ready to go home when:  · You can walk to the bathroom and use the bathroom without help.  · You can eat solid food and swallow pills (if needed).  · You have no sign of infection or other health problems, including fever.   · You have adequate pain control.  · Your bleeding isn't excessive.  · You are able to care for your  and are emotionally stable.  Before leaving the hospital or birth center, youll be given written instructions for home self-care after vaginal delivery. Be sure to follow these instructions carefully. If you have questions or concerns, talk about them now.  If you  have stitches  You may have received stitches in the skin near your vagina. The stitches might have closed an episiotomy (an incision that enlarges the opening of the vagina). Or you may have needed stitches to repair torn skin. Either way, your stitches should dissolve within weeks. Until then, you can help reduce discomfort, aid healing, and reduce your risk of infection by keeping the stitches clean. These tips can help:  · Gently wipe from front to back after you urinate or have a bowel movement.  · After wiping, spray warm water on the area. Or you can have a sitz bath. This means sitting in a tub with a few inches of water in it. Then pat the area dry or use a hairdryer on a cool setting.  · Do not use soap or any solution except water on the area.  · You can take a shower unless told not to.  · Change sanitary pads at least every 2 to 4 hours.  · Place cold or heat packs on the area as directed by your healthcare providers or nurses. Keep a thin towel between the pack and your skin.  · Sit on firm seats so the stitches pull less.   follow-up  Schedule a  follow-up exam with your healthcare provider for about 6 weeks after delivery. During this exam, your uterus and vaginal area will be checked. Contact your healthcare provider if you think you or your baby are having any problems.  When to call your healthcare provider  Call your health care provider right away if you have:  · A fever of 100.4°F (38.0°C) or higher  · Bleeding that requires a new sanitary pad after an hour, or large blood clots  · Pain in your vagina that gets worse and isn't relieved with medicine  · Swelling, discharge, or increased pain from vaginal tear or episiotomy  · Burning, pain, red streaks, or lumpy areas in your breasts that may be accompanied by flu-like symptoms  · Cracks, blisters, or blood on your nipples  · Burning or pain when you urinate  · Nausea or vomiting  · Dizziness or fainting  · Feelings of extreme  sadness or anxiety, or a feeling that you dont want to be with your baby  · Abdominal pain that isnt relieved with medicine  · Vaginal discharge that has a bad odor  · No bowel movement for 5 days  · Painful urination, orinability to control urination  · Redness, warmth, or pain in the lower leg  · Chest pain   Date Last Reviewed: 8/2/2015  © 2138-7542 [employer]. [address] [place] [address], [place] [address]. All rights reserved. This information is not intended as a substitute for professional medical care. Always follow your healthcare professional's instructions.     (if still interested in tubal ligation)  Here is some information to read about the procedure and plan to discuss further with MD at consultation.   Your Laparoscopic Tubal Sterilization Procedure  Getting ready for surgery  Your doctor will talk with you about preparing for surgery. You will need to:  · Sign a sterilization consent form. This often must be signed weeks in advance.  · Have tests, such as blood tests. These help show your general health.  · Tell your doctor if you take any medicines, supplements, or herbal remedies. You may need to stop taking some of them before surgery.  · Stop eating and drinking anything after midnight, the night before surgery.  · Arrange for an adult family member or friend to give you a ride home after surgery.  · Arrive at the hospital or surgical facility on time. You will be asked to sign certain forms and change into a patient gown.  During surgery  · Youll be given an IV (intravenous line) and medicine that lets you sleep during surgery.  · After the anesthesia takes effect, your surgeon makes a small incision in or below your navel.  · Your abdomen is inflated with small amounts of gas to lift the abdominal wall. This makes it easier to guide instruments to the tubes.  · Your surgeon then inserts the laparoscope to view the organs in your abdomen.  · Surgical instruments may be placed  through the laparoscope or through other small incisions.  · The fallopian tubes are blocked using one of several methods (see below).  · Once the tubes are blocked, your surgeon slowly releases the gas and removes the instruments.  · The incisions are closed with sutures or staples.  Blocking the fallopian tubes  To block the tubes, your surgeon will use one of the methods listed below.       Cauterization uses electrical current to heat and seal each tube. The sealed ends of the tubes may then be cut. A ring or band closes each tube, keeping egg and sperm from being able to meet. It is left in place. A clip shuts off each tube, blocking the passage of sperm and egg. It is left in place.   After surgery  Youll rest in the recovery area until you feel well enough to go home. Be sure to have an adult friend or family member drive you. You will likely feel tired, so take it easy for the rest of the day. Ask your doctor when its OK to resume your normal routine. For the first few days you may have:  · Pain at the incision sites. Use pain relief medicine if needed.  · Shoulder pain. This is caused by the gas used during surgery. You may also have a gassy or bloated feeling.  · A small amount of vaginal bleeding. Use pads instead of tampons.  When to call your healthcare provider  Call your healthcare provider if you have:  · Redness, drainage, or swelling at the incisions  · A fever of 100.4°F (38°C) or higher, or as directed by your healthcare provider  · Difficulty urinating  · Foul-smelling or unusual vaginal discharge  · Severe abdominal pain or bloating  · Nausea or vomiting  · Persistent or heavy bleeding. This means more than a pad an hour for 2 hours.  · A missed period, irregular bleeding, or severe abdominal pain. These symptoms can be signs of a tubal pregnancy.   Date Last Reviewed: 5/12/2015  © 2979-9898 [employer]. [address] [place] [address], [place] [address]. All rights reserved. This  information is not intended as a substitute for professional medical care. Always follow your healthcare professional's instructions.

## 2019-08-25 NOTE — LACTATION NOTE
Pt reports feedings are going well, endorses some nipple tenderness, provided with lanolin. Pt provided with information on how to obtain breast pump through Medicaid. Lactation discharge education completed. Plan of care is for pt to follow basic breastfeeding education, frequent feeding on demand, and to monitor baby's voids and stools. Breastfeeding guide, including First Alert survey, resource list, and lactation warmline phone number reviewed. Pt to notify doctor for maternal or infant concerns, as reviewed with LC. Pt verbalizes understanding.        08/25/19 4582   Maternal Infant Feeding   Maternal Emotional State independent   Lactation Referrals   Lactation Referrals outpatient lactation program;support group

## 2019-08-26 NOTE — ANESTHESIA PREPROCEDURE EVALUATION
2019  Katey Cedillo is a 34 y.o. female     OB History    Para Term  AB Living   2 2 2     2   SAB TAB Ectopic Multiple Live Births         0 2      # Outcome Date GA Lbr Kaden/2nd Weight Sex Delivery Anes PTL Lv   2 Term 19 39w4d  3.374 kg (7 lb 7 oz) M Vag-Spont EPI  YUMIKO   1 Term 10/22/13 38w0d  2.892 kg (6 lb 6 oz) F Vag-Spont EPI N YUMIKO      Birth Comments: water broke evening        Wt Readings from Last 1 Encounters:   19 0827 63.7 kg (140 lb 8.7 oz)       BP Readings from Last 3 Encounters:   19 (!) 107/56   19 102/60   19 114/60       Patient Active Problem List   Diagnosis    History of depression    Pilonidal cyst with abscess (I&D during pregnancy)     (spontaneous vaginal delivery)    Delivery outcome of single liveborn infant    Normal postpartum course       No past surgical history on file.    Social History     Socioeconomic History    Marital status: Single     Spouse name: Alvarado-partner    Number of children: Not on file    Years of education: Not on file    Highest education level: Not on file   Occupational History    Occupation: [employer]   Social Needs    Financial resource strain: Not on file    Food insecurity:     Worry: Not on file     Inability: Not on file    Transportation needs:     Medical: Not on file     Non-medical: Not on file   Tobacco Use    Smoking status: Never Smoker    Smokeless tobacco: Never Used   Substance and Sexual Activity    Alcohol use: Not Currently    Drug use: Never    Sexual activity: Yes     Partners: Male     Comment: [facility]   Lifestyle    Physical activity:     Days per week: Not on file     Minutes per session: Not on file    Stress: Only a little   Relationships    Social connections:     Talks on phone: Not on file     Gets together: Not on file     Attends Oriental orthodox service:  Not on file     Active member of club or organization: Not on file     Attends meetings of clubs or organizations: Not on file     Relationship status: Not on file   Other Topics Concern    Not on file   Social History Narrative    Not on file         Chemistry    No results found for: NA, K, CL, CO2, BUN, CREATININE, GLU No results found for: CALCIUM, ALKPHOS, AST, ALT, BILITOT, ESTGFRAFRICA, EGFRNONAA         Lab Results   Component Value Date    WBC 15.30 (H) 08/23/2019    HGB 11.2 (L) 08/23/2019    HCT 33.4 (L) 08/23/2019    MCV 85 08/23/2019     08/23/2019       No results for input(s): PT, INR, PROTIME, APTT in the last 72 hours.                  Anesthesia Evaluation    I have reviewed the Patient Summary Reports.     I have reviewed the Medications.     Review of Systems  Anesthesia Hx:  Denies Family Hx of Anesthesia complications.   Denies Personal Hx of Anesthesia complications.   Hematology/Oncology:        Denies Current/Recent Cancer   Cardiovascular:   Denies Hypertension.  Denies Dysrhythmias.   Denies Angina.        Pulmonary:   Denies COPD.  Denies Asthma.  Denies Sleep Apnea.    Renal/:   Denies Chronic Renal Disease.     Hepatic/GI:   Denies GERD.    Musculoskeletal:  Musculoskeletal Normal    Neurological:   Denies CVA. Denies Seizures.    Endocrine:   Denies Diabetes.    Psych:   depression          Physical Exam  General:  Well nourished    Airway/Jaw/Neck:  Airway Findings: Mouth Opening: Normal Tongue: Normal  General Airway Assessment: Adult  Mallampati: III  Improves to II with phonation.  TM Distance: Normal, at least 6 cm  Jaw/Neck Findings:  Neck ROM: Normal ROM      Dental:  Dental Findings: In tact   Chest/Lungs:  Chest/Lungs Findings: Clear to auscultation, Normal Respiratory Rate     Heart/Vascular:  Heart Findings: Rate: Normal  Rhythm: Regular Rhythm        Mental Status:  Mental Status Findings:  Cooperative, Alert and Oriented         Anesthesia Plan  Type of  Anesthesia, risks & benefits discussed:  Anesthesia Type:  general, CSE, spinal, epidural  Patient's Preference:   Intra-op Monitoring Plan: standard ASA monitors  Intra-op Monitoring Plan Comments:   Post Op Pain Control Plan: multimodal analgesia and per primary service following discharge from PACU  Post Op Pain Control Plan Comments:   Induction:   IV  Beta Blocker:  Patient is not currently on a Beta-Blocker (No further documentation required).       Informed Consent: Patient understands risks and agrees with Anesthesia plan.  Questions answered. Anesthesia consent signed with patient.  ASA Score: 2     Day of Surgery Review of History & Physical:    H&P update referred to the surgeon.         Ready For Surgery From Anesthesia Perspective.

## 2019-09-09 NOTE — ADDENDUM NOTE
Addendum  created 09/09/19 1450 by Krystal Cui MD    Diagnosis association updated, Intraprocedure Blocks edited, Sign clinical note

## 2019-10-04 ENCOUNTER — TELEPHONE (OUTPATIENT)
Dept: OBSTETRICS AND GYNECOLOGY | Facility: CLINIC | Age: 34
End: 2019-10-04

## 2019-10-04 NOTE — TELEPHONE ENCOUNTER
----- Message from Janet Valentino sent at 10/4/2019  9:27 AM CDT -----  Contact: TAE TEE [[id number]]  Name of Who is Calling: TAE TEE [[id number]]      What is the request in detail: patient would like to schedule post-partum visit. Patient delivered on 08/23 and first available wasn't until 10/15. Please call to schedule      Can the clinic reply by [facility]: no    What Number to Call Back if not in [facility]: [phone]

## 2019-10-15 ENCOUNTER — TELEPHONE (OUTPATIENT)
Dept: OBSTETRICS AND GYNECOLOGY | Facility: CLINIC | Age: 34
End: 2019-10-15

## 2019-10-15 ENCOUNTER — POSTPARTUM VISIT (OUTPATIENT)
Dept: OBSTETRICS AND GYNECOLOGY | Facility: CLINIC | Age: 34
End: 2019-10-15
Payer: MEDICAID

## 2019-10-15 VITALS
SYSTOLIC BLOOD PRESSURE: 100 MMHG | WEIGHT: 119.38 LBS | BODY MASS INDEX: 20.38 KG/M2 | DIASTOLIC BLOOD PRESSURE: 56 MMHG | HEIGHT: 64 IN

## 2019-10-15 DIAGNOSIS — Z30.9 ENCOUNTER FOR CONTRACEPTIVE MANAGEMENT, UNSPECIFIED TYPE: Primary | ICD-10-CM

## 2019-10-15 PROCEDURE — 99213 OFFICE O/P EST LOW 20 MIN: CPT | Mod: PBBFAC | Performed by: ADVANCED PRACTICE MIDWIFE

## 2019-10-15 PROCEDURE — 99999 PR PBB SHADOW E&M-EST. PATIENT-LVL III: CPT | Mod: PBBFAC,,, | Performed by: ADVANCED PRACTICE MIDWIFE

## 2019-10-15 PROCEDURE — 99999 PR PBB SHADOW E&M-EST. PATIENT-LVL III: ICD-10-PCS | Mod: PBBFAC,,, | Performed by: ADVANCED PRACTICE MIDWIFE

## 2019-10-15 NOTE — PROGRESS NOTES
"HISTORY OF PRESENT ILLNESS:  Katey Cedillo is a 34 y.o. female   Presents today for a  7  weeks post partum exam andhas no complaints) following a , .  .    Delivery Date:  19  Gender:  Male  Baby Name:  Neno  Birth Weight:  7#7  Work plans- stay at home  Breast Feeding: yes  Vaginal Bleeding: Spotting  Incontinence: No  Depression: No  [place] yet: No  Contraception discussed and patient desires   Support system: Good.    PREGNANCY COMPLICATED BY:    Current Outpatient Medications on File Prior to Visit   Medication Sig Dispense Refill    ferrous sulfate 324 mg (65 mg iron) TbEC Take 1 tablet (324 mg total) by mouth once daily.  0    ibuprofen (ADVIL,MOTRIN) 600 MG tablet Take 1 tablet (600 mg total) by mouth every 6 (six) hours as needed (cramping). 30 tablet 0    prenatal vit,calc76/iron/folic (PNV 29-1 ORAL) Take 1 tablet by mouth once daily.       No current facility-administered medications on file prior to visit.        BP (!) 100/56   Ht 5' 4" (1.626 m)   Wt 54.2 kg (119 lb 6.1 oz)   Breastfeeding? Yes   BMI 20.49 kg/m²     PE:   APPEARANCE: Well nourished, well developed, in no acute distress.   AFFECT: WNL, alert and oriented x 3.   NECK: Neck symmetric, without masses or thyromegaly.   NODES: No inguinal, cervical, axillary or femoral lymph node enlargement.   ABDOMEN: Soft. No tenderness or masses. No hepatosplenomegaly. No hernias.   BREASTS: (Symmetrical, no skin changes, visible lesions, palpable masses or nipple discharge bilaterally. ) (Not examined due to breast feeding).  PELVIC: External female genitalia without lesions. Female hair distribution. Adequate perineal body, Normal urethral meatus. Vagina moist and well rugated without lesions or discharge. No significant cystocele or rectocele present. Cervix pink without lesions, discharge or tenderness. Uterus is involuted to 4-6 week size, regular, mobile and nontender. Adnexa without masses or tenderness. "   EXTREMITIES: No edema     DIAGNOSIS:   1. Normal Post Partum Exam     LABS AND TESTS ORDERED:     MEDICATIONS PRESCRIBED:       COUNSELING:   The patient was counseled today on:    -all contraceptive options and she is undecided re BCM- advised to call if decides on method.  -may resume usual activities;  -A.C.S. Pap and pelvic exam guidelines, recomendations for yearly mammogram, monthly self breast exams and to follow up with her PCP for other health maintenance.     FOLLOW-UP for a WWE and Pap.

## 2021-02-26 ENCOUNTER — PATIENT MESSAGE (OUTPATIENT)
Dept: OBSTETRICS AND GYNECOLOGY | Facility: CLINIC | Age: 36
End: 2021-02-26

## 2021-04-28 ENCOUNTER — PATIENT MESSAGE (OUTPATIENT)
Dept: RESEARCH | Facility: HOSPITAL | Age: 36
End: 2021-04-28

## 2022-04-15 ENCOUNTER — HOSPITAL ENCOUNTER (EMERGENCY)
Facility: HOSPITAL | Age: 37
Discharge: HOME | End: 2022-04-15
Attending: EMERGENCY MEDICINE
Payer: MEDICAID

## 2022-04-15 VITALS
WEIGHT: 138 LBS | TEMPERATURE: 99 F | SYSTOLIC BLOOD PRESSURE: 110 MMHG | HEART RATE: 72 BPM | OXYGEN SATURATION: 97 % | RESPIRATION RATE: 18 BRPM | BODY MASS INDEX: 23.56 KG/M2 | DIASTOLIC BLOOD PRESSURE: 60 MMHG | HEIGHT: 64 IN

## 2022-04-15 DIAGNOSIS — K21.9 GASTROESOPHAGEAL REFLUX DISEASE, UNSPECIFIED WHETHER ESOPHAGITIS PRESENT: ICD-10-CM

## 2022-04-15 DIAGNOSIS — R07.9 CHEST PAIN: Primary | ICD-10-CM

## 2022-04-15 DIAGNOSIS — E87.6 HYPOKALEMIA: ICD-10-CM

## 2022-04-15 DIAGNOSIS — M79.603 ARM PAIN: ICD-10-CM

## 2022-04-15 LAB
ALBUMIN SERPL BCP-MCNC: 4.7 G/DL (ref 3.5–5.2)
ALP SERPL-CCNC: 88 U/L (ref 55–135)
ALT SERPL W/O P-5'-P-CCNC: 15 U/L (ref 10–44)
ANION GAP SERPL CALC-SCNC: 12 MMOL/L (ref 8–16)
AST SERPL-CCNC: 20 U/L (ref 10–40)
BASOPHILS # BLD AUTO: 0.05 K/UL (ref 0–0.2)
BASOPHILS NFR BLD: 0.5 % (ref 0–1.9)
BILIRUB SERPL-MCNC: 0.6 MG/DL (ref 0.1–1)
BUN SERPL-MCNC: 9 MG/DL (ref 6–20)
CALCIUM SERPL-MCNC: 9.9 MG/DL (ref 8.7–10.5)
CHLORIDE SERPL-SCNC: 101 MMOL/L (ref 95–110)
CO2 SERPL-SCNC: 24 MMOL/L (ref 23–29)
CREAT SERPL-MCNC: 0.9 MG/DL (ref 0.5–1.4)
DIFFERENTIAL METHOD: NORMAL
EOSINOPHIL # BLD AUTO: 0.1 K/UL (ref 0–0.5)
EOSINOPHIL NFR BLD: 0.9 % (ref 0–8)
ERYTHROCYTE [DISTWIDTH] IN BLOOD BY AUTOMATED COUNT: 12.8 % (ref 11.5–14.5)
EST. GFR  (AFRICAN AMERICAN): >60 ML/MIN/1.73 M^2
EST. GFR  (NON AFRICAN AMERICAN): >60 ML/MIN/1.73 M^2
GLUCOSE SERPL-MCNC: 80 MG/DL (ref 70–110)
HCT VFR BLD AUTO: 43.1 % (ref 37–48.5)
HGB BLD-MCNC: 14.2 G/DL (ref 12–16)
IMM GRANULOCYTES # BLD AUTO: 0.02 K/UL (ref 0–0.04)
IMM GRANULOCYTES NFR BLD AUTO: 0.2 % (ref 0–0.5)
LIPASE SERPL-CCNC: 21 U/L (ref 4–60)
LYMPHOCYTES # BLD AUTO: 3.2 K/UL (ref 1–4.8)
LYMPHOCYTES NFR BLD: 33.3 % (ref 18–48)
MCH RBC QN AUTO: 30.5 PG (ref 27–31)
MCHC RBC AUTO-ENTMCNC: 32.9 G/DL (ref 32–36)
MCV RBC AUTO: 93 FL (ref 82–98)
MONOCYTES # BLD AUTO: 0.4 K/UL (ref 0.3–1)
MONOCYTES NFR BLD: 4.5 % (ref 4–15)
NEUTROPHILS # BLD AUTO: 5.9 K/UL (ref 1.8–7.7)
NEUTROPHILS NFR BLD: 60.6 % (ref 38–73)
NRBC BLD-RTO: 0 /100 WBC
PLATELET # BLD AUTO: 305 K/UL (ref 150–450)
PMV BLD AUTO: 9.7 FL (ref 9.2–12.9)
POTASSIUM SERPL-SCNC: 3.3 MMOL/L (ref 3.5–5.1)
PROT SERPL-MCNC: 9.3 G/DL (ref 6–8.4)
RBC # BLD AUTO: 4.65 M/UL (ref 4–5.4)
SODIUM SERPL-SCNC: 137 MMOL/L (ref 136–145)
TROPONIN I SERPL DL<=0.01 NG/ML-MCNC: <0.006 NG/ML (ref 0–0.03)
WBC # BLD AUTO: 9.74 K/UL (ref 3.9–12.7)

## 2022-04-15 PROCEDURE — 87389 HIV-1 AG W/HIV-1&-2 AB AG IA: CPT | Performed by: EMERGENCY MEDICINE

## 2022-04-15 PROCEDURE — 83690 ASSAY OF LIPASE: CPT | Performed by: PHYSICIAN ASSISTANT

## 2022-04-15 PROCEDURE — 84484 ASSAY OF TROPONIN QUANT: CPT | Performed by: PHYSICIAN ASSISTANT

## 2022-04-15 PROCEDURE — 93010 EKG 12-LEAD: ICD-10-PCS | Mod: ,,, | Performed by: INTERNAL MEDICINE

## 2022-04-15 PROCEDURE — 99285 EMERGENCY DEPT VISIT HI MDM: CPT | Mod: ,,, | Performed by: PHYSICIAN ASSISTANT

## 2022-04-15 PROCEDURE — 80053 COMPREHEN METABOLIC PANEL: CPT | Performed by: PHYSICIAN ASSISTANT

## 2022-04-15 PROCEDURE — 99285 EMERGENCY DEPT VISIT HI MDM: CPT | Mod: 25

## 2022-04-15 PROCEDURE — 85025 COMPLETE CBC W/AUTO DIFF WBC: CPT | Performed by: PHYSICIAN ASSISTANT

## 2022-04-15 PROCEDURE — 86803 HEPATITIS C AB TEST: CPT | Performed by: EMERGENCY MEDICINE

## 2022-04-15 PROCEDURE — 93005 ELECTROCARDIOGRAM TRACING: CPT

## 2022-04-15 PROCEDURE — 93010 ELECTROCARDIOGRAM REPORT: CPT | Mod: ,,, | Performed by: INTERNAL MEDICINE

## 2022-04-15 PROCEDURE — 99285 PR EMERGENCY DEPT VISIT,LEVEL V: ICD-10-PCS | Mod: ,,, | Performed by: PHYSICIAN ASSISTANT

## 2022-04-15 RX ORDER — PANTOPRAZOLE SODIUM 20 MG/1
20 TABLET, DELAYED RELEASE ORAL DAILY
Qty: 30 TABLET | Refills: 0 | Status: SHIPPED | OUTPATIENT
Start: 2022-04-15 | End: 2023-03-22 | Stop reason: SDUPTHER

## 2022-04-15 NOTE — ED PROVIDER NOTES
Encounter Date: 4/15/2022       History     Chief Complaint   Patient presents with    Multiple complaints     L shoulder upper arm hurts with movement for few months, chest pressure movement     36-year-old female with history of depression presents to the ED with a chief complaint of chest pain.  Patient reports intermittent left-sided chest pain that began 3-4 months ago.  Patient states that she feels the pain mostly when she bends over and stands back up.  She also feels the pain when she sits up from a reclined position.  Patient also has pain to her left anterior shoulder.  This is also worse with movement of the shoulder.  She denies any injury.  He denies any repetitive motions, however patient has a 2-year-old child who she repeatedly picks up.  Patient only feels short of breath when standing up from a bent over position.  Denies any worsening of her symptoms with exertion.  Denies any shortness of breath with exertion.  Denies any calf pain or swelling, fever, cough.  She has attempted treatment with Tylenol without significant relief.  Patient also mentioned epigastric pain that has been ongoing for over a year.  She states that she only has pain with palpation of the area, lying on her stomach or when her child is sitting on her stomach.  Denies any worsening of the pain with eating.  Patient reports nausea when she lies on her stomach.  Patient has not been previously evaluated for these symptoms.         Review of patient's allergies indicates:  No Known Allergies  Past Medical History:   Diagnosis Date    Depression     2011--alternative treatments ( hospitalization x 1)     Pilonidal cyst with abscess 2019    I&D  during pregnancy     History reviewed. No pertinent surgical history.  Family History   Problem Relation Age of Onset    Breast cancer Paternal Grandmother         > 49 y/o at diagnosis    Breast cancer Cousin         40's at diagnosis    Hypertension Neg Hx     Cancer Neg Hx      Ovarian cancer Neg Hx     Colon cancer Neg Hx      Social History     Tobacco Use    Smoking status: Never Smoker    Smokeless tobacco: Never Used   Substance Use Topics    Alcohol use: Not Currently    Drug use: Never     Review of Systems   Constitutional: Negative for fever.   HENT: Negative for sore throat.    Respiratory: Negative for shortness of breath.    Cardiovascular: Positive for chest pain.   Gastrointestinal: Positive for abdominal pain. Negative for nausea.   Genitourinary: Negative for dysuria.   Musculoskeletal: Positive for arthralgias (L shoulder). Negative for back pain.   Skin: Negative for rash.   Neurological: Negative for weakness.   Hematological: Does not bruise/bleed easily.       Physical Exam     Initial Vitals [04/15/22 1410]   BP Pulse Resp Temp SpO2   117/63 74 18 99.3 °F (37.4 °C) 100 %      MAP       --         Physical Exam    Nursing note and vitals reviewed.  Constitutional: She appears well-developed and well-nourished. She is not diaphoretic.  Non-toxic appearance. She does not appear ill. No distress.   HENT:   Head: Normocephalic and atraumatic.   Neck: Neck supple.   Cardiovascular: Normal rate and regular rhythm. Exam reveals no gallop and no friction rub.    No murmur heard.  Pulses:       Radial pulses are 2+ on the right side and 2+ on the left side.   Pulmonary/Chest: Effort normal and breath sounds normal. No accessory muscle usage. No tachypnea. No respiratory distress. She has no decreased breath sounds. She has no wheezes. She has no rhonchi. She has no rales.   TTP of the left chest. However, does not reproduce reported complaint.    Abdominal: She exhibits no distension.   Musculoskeletal:      Cervical back: Neck supple.      Comments: Palpation of the L anterior shoulder and external rotation of the shoulder reproduces shoulder pain. Full ROM.      Neurological: She is alert.   Skin: No rash noted.   Psychiatric: She has a normal mood and affect. Her  behavior is normal.         ED Course   Procedures  Labs Reviewed   COMPREHENSIVE METABOLIC PANEL - Abnormal; Notable for the following components:       Result Value    Potassium 3.3 (*)     Total Protein 9.3 (*)     All other components within normal limits   CBC W/ AUTO DIFFERENTIAL   LIPASE   TROPONIN I   HIV 1 / 2 ANTIBODY   HEPATITIS C ANTIBODY          Imaging Results          X-Ray Chest PA And Lateral (Final result)  Result time 04/15/22 16:45:25    Final result by Delmar Coulter MD (04/15/22 16:45:25)                 Impression:      No acute process.      Electronically signed by: Delmar Coulter MD  Date:    04/15/2022  Time:    16:45             Narrative:    EXAMINATION:  XR CHEST PA AND LATERAL    CLINICAL HISTORY:  Chest pain, unspecified    TECHNIQUE:  PA and lateral views of the chest were performed.    COMPARISON:  None    FINDINGS:  The trachea is unremarkable.  The cardiomediastinal silhouette is within normal limits.  The hemidiaphragms are unremarkable.  There is no evidence of free air beneath the hemidiaphragms.  There are no pleural effusions.  There is no evidence of a pneumothorax.  There is no evidence of pneumomediastinum.  No airspace opacity is present.  The osseous structures are unremarkable.                                 Medications - No data to display  Medical Decision Making:   History:   Old Medical Records: I decided to obtain old medical records.  Initial Assessment:   37 yo              ED Course as of 04/15/22 1716   Fri Apr 15, 2022   1649 BP: 110/60 [CL]   1649 Temp: 99.3 °F (37.4 °C) [CL]   1649 Pulse: 72 [CL]   1649 Resp: 18 [CL]   1649 SpO2: 97 % [CL]   1649 WBC: 9.74 [CL]   1649 Hemoglobin: 14.2 [CL]   1649 Hematocrit: 43.1 [CL]   1649 Platelets: 305 [CL]   1649 Sodium: 137 [CL]   1649 Potassium(!): 3.3 [CL]   1649 Chloride: 101 [CL]   1649 CO2: 24 [CL]   1649 Glucose: 80 [CL]   1649 Calcium: 9.9 [CL]   1650 BILIRUBIN TOTAL: 0.6 [CL]   1650 AST: 20 [CL]   1650 ALT: 15  [CL]   1650 Anion Gap: 12 [CL]   1650 Troponin I: <0.006 [CL]   1650 Lipase: 21 [CL]   1650 X-Ray Chest PA And Lateral  No acute processes. [CL]   1713 EKG with new T wave inversions in leads III, V3, V4. No prior for comparison. Will obtain labs including troponin.    Lab work pending.  Pt will be signed out to fellow MITCHELL Moon pending results and final disposition. [EH]      ED Course User Index  [CL] Twila Moon PA-C  [EH] Nereyda Bowen PA-C             Clinical Impression:   Final diagnoses:  [M79.603] Arm pain  [R07.9] Chest pain (Primary)  [E87.6] Hypokalemia          ED Disposition Condition    Discharge Stable        ED Prescriptions     Medication Sig Dispense Start Date End Date Auth. Provider    pantoprazole (PROTONIX) 20 MG tablet Take 1 tablet (20 mg total) by mouth once daily. 30 tablet 4/15/2022  Twila Moon PA-C        Follow-up Information     Follow up With Specialties Details Why Contact Info    Sri Avendano CNM Obstetrics Schedule an appointment as soon as possible for a visit   [address] [facility] [address]  [phone]      [facility]  Schedule an appointment as soon as possible for a visit  [phone] [address] [facility] [address]    [facility]  Schedule an appointment as soon as possible for a visit  [phone], If symptoms worsen [address] [facility] [address]    [facility] - Emergency Dept Emergency Medicine  If symptoms worsen [address] [facility] [id number] [phone]           Nereyda Bowen PA-C  04/15/22 9222

## 2022-04-15 NOTE — DISCHARGE INSTRUCTIONS
Take protonix daily for suspect GERD/gastritis.   Avoid ibuprofen and other NSAIDs as this can make your symptoms worse.  You can take acetaminophen/tylenol 650 mg every 6 hours for added relief.  Apply ice to the area for 10-20 minutes every 4 hours. You can apply heat 2 days after for the same duration and frequency.  Follow up with Primary Care Physician if your symptoms do not improve.   Return to the ER for new or worsening symptoms.

## 2022-04-15 NOTE — ED TRIAGE NOTES
36 y.o. female arrived to the ED via personal transport from home for multiple complaints. Pt reports left-sided chest pressure w/ associated left shoulder pain for x3-4months. Pt endorses chest pressure is elicited w/ positional changes and shoulder pain when extended backwards. Endorses intermittent tingling down LUE.

## 2022-04-15 NOTE — PROVIDER PROGRESS NOTES - EMERGENCY DEPT.
"Encounter Date: 4/15/2022    ED Physician Progress Notes        Physician Note:   ED Physician Hand-off Note:    ED Course: I assumed care of patient from off-going ED primary team. Briefly, "36-year-old female with history of depression presents to the ED with a chief complaint of chest pain.  Patient reports intermittent left-sided chest pain that began 3-4 months ago."    At the time of signout, the plan was pending labs.    Medications given in the ED:    Medications - No data to display    ED course:  Labs with very mild hypokalemia.  No signs of kidney or liver failure.  Lipase within normal limits.  Doubt ACS.  Chest x-ray without acute processes.  Patient updated with labs and imaging test.  I suspect her chest and shoulder pain is due to MSK pain.  We discussed etiology.  Her chronic abdominal pain could be due to gastritis/GERD.  We discussed discontinuing NSAID use.  I prescribed Protonix.  She is to follow up with PCP and Gastroenterology.  Increase potassium in diet.  All her questions were answered.  Patient and her mother comfortable with plan, and patient is stable for discharge.    Disposition: discharge    Impression:   Final diagnoses:  (M79.603) Arm pain  (R07.9) Chest pain (Primary)  (E87.6) Hypokalemia  (K21.9) Gastroesophageal reflux disease, unspecified whether esophagitis present      5:25 PM  Twila Moon PA-C  Emergent Department  [facility]  Spectralink #[phone] or #[phone]           "

## 2022-04-15 NOTE — Clinical Note
"Katey"Katey" Mamadou was seen and treated in our emergency department on 4/15/2022.  She may return to work on 04/18/2022.       If you have any questions or concerns, please don't hesitate to call.      Twila Moon PA-C"

## 2022-04-18 LAB
HCV AB SERPL QL IA: NEGATIVE
HIV 1+2 AB+HIV1 P24 AG SERPL QL IA: NEGATIVE

## 2023-01-23 ENCOUNTER — HOSPITAL ENCOUNTER (EMERGENCY)
Facility: HOSPITAL | Age: 38
Discharge: HOME | End: 2023-01-23
Attending: EMERGENCY MEDICINE
Payer: MEDICAID

## 2023-01-23 VITALS
WEIGHT: 134 LBS | SYSTOLIC BLOOD PRESSURE: 113 MMHG | HEIGHT: 64 IN | TEMPERATURE: 99 F | RESPIRATION RATE: 16 BRPM | DIASTOLIC BLOOD PRESSURE: 57 MMHG | HEART RATE: 63 BPM | BODY MASS INDEX: 22.88 KG/M2 | OXYGEN SATURATION: 99 %

## 2023-01-23 DIAGNOSIS — R10.9 ABDOMINAL PAIN: Primary | ICD-10-CM

## 2023-01-23 LAB
ALBUMIN SERPL BCP-MCNC: 4.4 G/DL (ref 3.5–5.2)
ALP SERPL-CCNC: 82 U/L (ref 55–135)
ALT SERPL W/O P-5'-P-CCNC: 15 U/L (ref 10–44)
ANION GAP SERPL CALC-SCNC: 11 MMOL/L (ref 8–16)
AST SERPL-CCNC: 20 U/L (ref 10–40)
B-HCG UR QL: NEGATIVE
BACTERIA #/AREA URNS AUTO: ABNORMAL /HPF
BASOPHILS # BLD AUTO: 0.04 K/UL (ref 0–0.2)
BASOPHILS NFR BLD: 0.6 % (ref 0–1.9)
BILIRUB SERPL-MCNC: 0.4 MG/DL (ref 0.1–1)
BILIRUB UR QL STRIP: NEGATIVE
BUN SERPL-MCNC: 9 MG/DL (ref 6–20)
CALCIUM SERPL-MCNC: 9.2 MG/DL (ref 8.7–10.5)
CHLORIDE SERPL-SCNC: 102 MMOL/L (ref 95–110)
CLARITY UR REFRACT.AUTO: CLEAR
CO2 SERPL-SCNC: 22 MMOL/L (ref 23–29)
COLOR UR AUTO: YELLOW
CREAT SERPL-MCNC: 0.8 MG/DL (ref 0.5–1.4)
CTP QC/QA: YES
DIFFERENTIAL METHOD: ABNORMAL
EOSINOPHIL # BLD AUTO: 0 K/UL (ref 0–0.5)
EOSINOPHIL NFR BLD: 0 % (ref 0–8)
ERYTHROCYTE [DISTWIDTH] IN BLOOD BY AUTOMATED COUNT: 12.7 % (ref 11.5–14.5)
EST. GFR  (NO RACE VARIABLE): >60 ML/MIN/1.73 M^2
GLUCOSE SERPL-MCNC: 83 MG/DL (ref 70–110)
GLUCOSE UR QL STRIP: NEGATIVE
HCT VFR BLD AUTO: 42.1 % (ref 37–48.5)
HCV AB SERPL QL IA: NORMAL
HGB BLD-MCNC: 13.4 G/DL (ref 12–16)
HGB UR QL STRIP: ABNORMAL
HIV 1+2 AB+HIV1 P24 AG SERPL QL IA: NORMAL
HYALINE CASTS UR QL AUTO: 0 /LPF
IMM GRANULOCYTES # BLD AUTO: 0.02 K/UL (ref 0–0.04)
IMM GRANULOCYTES NFR BLD AUTO: 0.3 % (ref 0–0.5)
KETONES UR QL STRIP: ABNORMAL
LEUKOCYTE ESTERASE UR QL STRIP: ABNORMAL
LIPASE SERPL-CCNC: 26 U/L (ref 4–60)
LYMPHOCYTES # BLD AUTO: 1.7 K/UL (ref 1–4.8)
LYMPHOCYTES NFR BLD: 27.8 % (ref 18–48)
MCH RBC QN AUTO: 30 PG (ref 27–31)
MCHC RBC AUTO-ENTMCNC: 31.8 G/DL (ref 32–36)
MCV RBC AUTO: 94 FL (ref 82–98)
MICROSCOPIC COMMENT: ABNORMAL
MONOCYTES # BLD AUTO: 0.6 K/UL (ref 0.3–1)
MONOCYTES NFR BLD: 9.9 % (ref 4–15)
NEUTROPHILS # BLD AUTO: 3.8 K/UL (ref 1.8–7.7)
NEUTROPHILS NFR BLD: 61.4 % (ref 38–73)
NITRITE UR QL STRIP: NEGATIVE
NRBC BLD-RTO: 0 /100 WBC
PH UR STRIP: 6 [PH] (ref 5–8)
PLATELET # BLD AUTO: 244 K/UL (ref 150–450)
PMV BLD AUTO: 9.6 FL (ref 9.2–12.9)
POTASSIUM SERPL-SCNC: 3.5 MMOL/L (ref 3.5–5.1)
PROT SERPL-MCNC: 8.5 G/DL (ref 6–8.4)
PROT UR QL STRIP: ABNORMAL
RBC # BLD AUTO: 4.47 M/UL (ref 4–5.4)
RBC #/AREA URNS AUTO: 2 /HPF (ref 0–4)
SODIUM SERPL-SCNC: 135 MMOL/L (ref 136–145)
SP GR UR STRIP: >1.03 (ref 1–1.03)
SQUAMOUS #/AREA URNS AUTO: 4 /HPF
URN SPEC COLLECT METH UR: ABNORMAL
WBC # BLD AUTO: 6.25 K/UL (ref 3.9–12.7)
WBC #/AREA URNS AUTO: 12 /HPF (ref 0–5)

## 2023-01-23 PROCEDURE — 81001 URINALYSIS AUTO W/SCOPE: CPT | Performed by: EMERGENCY MEDICINE

## 2023-01-23 PROCEDURE — 99284 EMERGENCY DEPT VISIT MOD MDM: CPT | Mod: ,,, | Performed by: PHYSICIAN ASSISTANT

## 2023-01-23 PROCEDURE — 83690 ASSAY OF LIPASE: CPT | Performed by: PHYSICIAN ASSISTANT

## 2023-01-23 PROCEDURE — 25000003 PHARM REV CODE 250: Performed by: PHYSICIAN ASSISTANT

## 2023-01-23 PROCEDURE — 81025 URINE PREGNANCY TEST: CPT | Performed by: EMERGENCY MEDICINE

## 2023-01-23 PROCEDURE — 87389 HIV-1 AG W/HIV-1&-2 AB AG IA: CPT | Performed by: PHYSICIAN ASSISTANT

## 2023-01-23 PROCEDURE — 85025 COMPLETE CBC W/AUTO DIFF WBC: CPT | Performed by: PHYSICIAN ASSISTANT

## 2023-01-23 PROCEDURE — 86803 HEPATITIS C AB TEST: CPT | Performed by: PHYSICIAN ASSISTANT

## 2023-01-23 PROCEDURE — 99284 EMERGENCY DEPT VISIT MOD MDM: CPT | Mod: 25

## 2023-01-23 PROCEDURE — 99284 PR EMERGENCY DEPT VISIT,LEVEL IV: ICD-10-PCS | Mod: ,,, | Performed by: PHYSICIAN ASSISTANT

## 2023-01-23 PROCEDURE — 80053 COMPREHEN METABOLIC PANEL: CPT | Performed by: PHYSICIAN ASSISTANT

## 2023-01-23 PROCEDURE — 87086 URINE CULTURE/COLONY COUNT: CPT | Performed by: EMERGENCY MEDICINE

## 2023-01-23 RX ORDER — ACETAMINOPHEN 500 MG
1000 TABLET ORAL
Status: COMPLETED | OUTPATIENT
Start: 2023-01-23 | End: 2023-01-23

## 2023-01-23 RX ORDER — DICYCLOMINE HYDROCHLORIDE 20 MG/1
20 TABLET ORAL 2 TIMES DAILY PRN
Qty: 10 TABLET | Refills: 0 | Status: SHIPPED | OUTPATIENT
Start: 2023-01-23 | End: 2024-03-25

## 2023-01-23 RX ORDER — FAMOTIDINE 20 MG/1
20 TABLET, FILM COATED ORAL 2 TIMES DAILY
Qty: 28 TABLET | Refills: 0 | Status: SHIPPED | OUTPATIENT
Start: 2023-01-23 | End: 2023-03-22

## 2023-01-23 RX ORDER — ONDANSETRON 4 MG/1
4 TABLET, ORALLY DISINTEGRATING ORAL EVERY 8 HOURS PRN
Qty: 12 TABLET | Refills: 0 | Status: SHIPPED | OUTPATIENT
Start: 2023-01-23 | End: 2024-02-26

## 2023-01-23 RX ORDER — SULFAMETHOXAZOLE AND TRIMETHOPRIM 800; 160 MG/1; MG/1
1 TABLET ORAL 2 TIMES DAILY
Qty: 20 TABLET | Refills: 0 | Status: SHIPPED | OUTPATIENT
Start: 2023-01-23 | End: 2023-02-02

## 2023-01-23 RX ORDER — MAG HYDROX/ALUMINUM HYD/SIMETH 200-200-20
5 SUSPENSION, ORAL (FINAL DOSE FORM) ORAL
Status: COMPLETED | OUTPATIENT
Start: 2023-01-23 | End: 2023-01-23

## 2023-01-23 RX ADMIN — ACETAMINOPHEN 1000 MG: 500 TABLET ORAL at 11:01

## 2023-01-23 RX ADMIN — ALUMINUM HYDROXIDE, MAGNESIUM HYDROXIDE, AND SIMETHICONE 5 ML: 200; 200; 20 SUSPENSION ORAL at 11:01

## 2023-01-23 NOTE — ED NOTES
Patient identifiers verified and correct for  Ms Cedillo  C/C:  Abd pain, back pain SEE NN  APPEARANCE: awake and alert in NAD. PAIN  10/10  SKIN: warm, dry and intact. No breakdown or bruising.  MUSCULOSKELETAL: Patient moving all extremities spontaneously, no obvious swelling or deformities noted. Ambulates independently.  RESPIRATORY: Denies shortness of breath.Respirations unlabored.   CARDIAC: Denies CP, 2+ distal pulses; no peripheral edema  ABDOMEN: ABdomens round, pain to mid upper abdmen, reports nausea, no emesis, diarrhea last  nigth  : voids spontaneously, denies difficulty  Neurologic: AAO x 4; follows commands equal strength in all extremities; denies numbness/tingling. Denies dizziness  Denies new weakness

## 2023-01-23 NOTE — ED PROVIDER NOTES
Encounter Date: 1/23/2023       History     Chief Complaint   Patient presents with    Abdominal Pain     Nausea and r flank pain, diarrhea yest and nausea today     37-year-old female presents to the ED with abdominal pain.  No pertinent past medical history.  Patient reports epigastric and right flank pain for the past 4 days.  She states that the pain has been relatively constant since onset.  She describes the pain as sharp.  She does mention that she has had epigastric discomfort for a few years specifically if she wears tight clothing over the area or if her son lays on her stomach.  She reports some associated nausea without vomiting.  She did have some diarrhea yesterday.  She reports some chills but denies fever.  Denies dysuria.    Review of patient's allergies indicates:  No Known Allergies  Past Medical History:   Diagnosis Date    Depression     2011--alternative treatments ( hospitalization x 1)     Pilonidal cyst with abscess 2019    I&D  during pregnancy     History reviewed. No pertinent surgical history.  Family History   Problem Relation Age of Onset    Breast cancer Paternal Grandmother         > 49 y/o at diagnosis    Breast cancer Cousin         40's at diagnosis    Hypertension Neg Hx     Cancer Neg Hx     Ovarian cancer Neg Hx     Colon cancer Neg Hx      Social History     Tobacco Use    Smoking status: Never    Smokeless tobacco: Never   Substance Use Topics    Alcohol use: Not Currently    Drug use: Never     Review of Systems   Constitutional:  Negative for fever.   HENT:  Negative for sore throat.    Respiratory:  Negative for shortness of breath.    Cardiovascular:  Negative for chest pain.   Gastrointestinal:  Positive for abdominal pain, diarrhea and nausea.   Genitourinary:  Positive for flank pain. Negative for dysuria.   Musculoskeletal:  Negative for back pain.   Skin:  Negative for rash.   Neurological:  Negative for weakness.   Hematological:  Does not bruise/bleed easily.      Physical Exam     Initial Vitals [01/23/23 1000]   BP Pulse Resp Temp SpO2   (!) 127/57 80 18 99 °F (37.2 °C) 100 %      MAP       --         Physical Exam    Nursing note and vitals reviewed.  Constitutional: She appears well-developed and well-nourished. She is not diaphoretic.  Non-toxic appearance. She does not appear ill. No distress.   HENT:   Head: Normocephalic and atraumatic.   Neck: Neck supple.   Cardiovascular:  Normal rate and regular rhythm.     Exam reveals no gallop and no friction rub.       No murmur heard.  Pulmonary/Chest: Effort normal and breath sounds normal. No accessory muscle usage. No tachypnea. No respiratory distress. She has no decreased breath sounds. She has no wheezes. She has no rhonchi. She has no rales.   Abdominal: Abdomen is soft. She exhibits no distension and no mass. There is abdominal tenderness in the right upper quadrant and epigastric area.   Epigastric > RUQ   No right CVA tenderness.  No left CVA tenderness.   Musculoskeletal:      Cervical back: Neck supple.     Neurological: She is alert.   Skin: No rash noted.   Psychiatric: She has a normal mood and affect. Her behavior is normal.       ED Course   Procedures  Labs Reviewed   URINALYSIS, REFLEX TO URINE CULTURE - Abnormal; Notable for the following components:       Result Value    Specific Gravity, UA >1.030 (*)     Protein, UA 1+ (*)     Ketones, UA 2+ (*)     Occult Blood UA 2+ (*)     Leukocytes, UA 2+ (*)     All other components within normal limits    Narrative:     Specimen Source->Urine   CBC W/ AUTO DIFFERENTIAL - Abnormal; Notable for the following components:    MCHC 31.8 (*)     All other components within normal limits   COMPREHENSIVE METABOLIC PANEL - Abnormal; Notable for the following components:    Sodium 135 (*)     CO2 22 (*)     Total Protein 8.5 (*)     All other components within normal limits   URINALYSIS MICROSCOPIC - Abnormal; Notable for the following components:    WBC, UA 12 (*)      Bacteria Few (*)     All other components within normal limits    Narrative:     Specimen Source->Urine   CULTURE, URINE   HIV 1 / 2 ANTIBODY    Narrative:     Release to patient->Immediate   HEPATITIS C ANTIBODY    Narrative:     Release to patient->Immediate   LIPASE   POCT URINE PREGNANCY          Imaging Results              US Abdomen Limited (Gallbladder) (Final result)  Result time 01/23/23 14:27:11      Final result by Ja Adair IV, MD (01/23/23 14:27:11)                   Impression:      1. The gallbladder is unremarkable.  2. Incidentally noted 1.4 cm hyperechoic lesion in the right hepatic lobe, may represent hemangioma although nonspecific.  On a nonemergent basis, further evaluation could be obtained with contrast enhanced MRI as clinically warranted.    Electronically signed by resident: Mari Smith  Date:    01/23/2023  Time:    13:44    Electronically signed by: Ja Adair  Date:    01/23/2023  Time:    14:27               Narrative:    EXAMINATION:  US ABDOMEN LIMITED FOR GALLBLADDER EVALUATION    CLINICAL HISTORY:  Unspecified abdominal pain    TECHNIQUE:  Limited ultrasound of the right upper quadrant of the abdomen including pancreas, gallbladder, common bile duct was performed.    COMPARISON:  CT renal stone study 01/23/2023    FINDINGS:  Gallbladder: No calculi, wall thickening, or pericholecystic fluid.  No sonographic Juarez's sign.    Biliary system: The common duct is not dilated, measuring 2 mm.  No intrahepatic ductal dilatation.    Pancreas: The visualized portions of pancreas appear normal.    Incidental well-circumscribed hyperechoic lesion in the right lobe of the liver measuring 1.4 x 1.0 x 1.0 cm.                                       CT Renal Stone Study ABD Pelvis WO (Final result)  Result time 01/23/23 11:53:13      Final result by Low Hightower MD (01/23/23 11:53:13)                   Impression:      No kidney stones or hydronephrosis.    Fat stranding and  prominent subcentimeter lymph nodes in the small bowel mesentery, suggesting mesenteric panniculitis.      Electronically signed by: Low Hightower  Date:    01/23/2023  Time:    11:53               Narrative:    EXAMINATION:  CT RENAL STONE STUDY ABD PELVIS WO    CLINICAL HISTORY:  Flank pain, kidney stone suspected;    TECHNIQUE:  Low dose axial images, sagittal and coronal reformations were obtained from the lung bases to the pubic symphysis.  Contrast was not administered.    COMPARISON:  None.    FINDINGS:  LUNG BASES: Unremarkable.    HEPATOBILIARY: No focal hepatic lesions. No biliary ductal dilatation. Normal gallbladder.    SPLEEN: No splenomegaly.    PANCREAS: No focal masses or ductal dilatation.    ADRENALS: No adrenal nodules.    KIDNEYS/URETERS: No hydronephrosis, stones, or solid mass lesions.    BLADDER/PELVIC ORGANS: Unremarkable.    PERITONEUM / RETROPERITONEUM: No free air or fluid. Fat stranding in the small bowel mesentery.    LYMPH NODES: Prominent subcentimeter mesenteric lymph nodes. No size significant lymphadenopathy.    VESSELS: Unremarkable.    GI TRACT: No distention or wall thickening. Normal appendix.    BONES AND SOFT TISSUES: Diastasis recti and small fat containing umbilical hernia.  No fractures or focal osseous lesions.                                       Medications   acetaminophen tablet 1,000 mg (1,000 mg Oral Given 1/23/23 1127)   aluminum-magnesium hydroxide-simethicone 200-200-20 mg/5 mL suspension 5 mL (5 mLs Oral Given 1/23/23 1128)     Medical Decision Making:   History:   Old Medical Records: I decided to obtain old medical records.  Initial Assessment:   37-year-old female presents to the ED with epigastric and flank pain.  Hemodynamically stable.  Afebrile.  No acute distress.  Differential Diagnosis:   My differential diagnosis includes but is not limited to:  Cholecystitis, kidney stone, gastritis, PUD, pancreatitis  Clinical Tests:   Lab Tests: Ordered  Radiological  Study: Ordered  ED Management:  Blood work was unremarkable.  CT showed no evidence of obstructive uropathy or other acute abdominal pelvic process.  An ultrasound was obtained to further assess for cholecystitis or acute biliary pathology.  Ultrasound showed a possible hemangioma noting a hyperechoic area on the liver.  Discussed results with the patient.  UA with possible infection.  Given presence of flank pain, will cover with antibiotics for UTI/early pyelonephritis.  Treated with Bactrim.  Patient received Tylenol and Maalox in the ED.  She reported resolution of her pain. Based on my clinical evaluation, I do not detect any immediate, emergent, or life threatening condition/etiology that warrants additional workup today. I feel that the patient can be discharged with close follow up care.  In addition to Bactrim, will treat patient with Pepcid, Bentyl, Zofran.  ED return precautions given.                            Clinical Impression:   Final diagnoses:  [R10.9] Abdominal pain (Primary)        ED Disposition Condition    Discharge Stable          ED Prescriptions       Medication Sig Dispense Start Date End Date Auth. Provider    famotidine (PEPCID) 20 MG tablet Take 1 tablet (20 mg total) by mouth 2 (two) times daily. for 14 days 28 tablet 1/23/2023 2/6/2023 Nereyda Bowen PA-C    dicyclomine (BENTYL) 20 mg tablet Take 1 tablet (20 mg total) by mouth 2 (two) times daily as needed (abdominal pain). 10 tablet 1/23/2023 -- Nereyda Bowen PA-C    sulfamethoxazole-trimethoprim 800-160mg (BACTRIM DS) 800-160 mg Tab Take 1 tablet by mouth 2 (two) times daily. for 10 days 20 tablet 1/23/2023 2/2/2023 Nereyda Bowen PA-C    ondansetron (ZOFRAN-ODT) 4 MG TbDL Take 1 tablet (4 mg total) by mouth every 8 (eight) hours as needed (nausea or vomiting). 12 tablet 1/23/2023 -- Nereyda Bowen PA-C          Follow-up Information       Follow up With Specialties Details Why Contact Info    [facility]  [facility]   [phone] [address] [facility] [address]             Nereyda Bowen PA-C  01/24/23 3212

## 2023-01-23 NOTE — ED NOTES
Patient states mid upper abd pain onset Thursday, reports poor appetite and nausea,no emesis  Also reports right lower back pain

## 2023-01-23 NOTE — DISCHARGE INSTRUCTIONS
Take your new medications as directed.  You can also take Tylenol as needed for pain.  You can take 1000 mg every 8 hours or 650 mg every 6 hours.    Follow-up with your primary doctor if your symptoms are not improving in the next several days.    Return to the ER if you develop any new or significantly worsening symptoms.

## 2023-01-24 LAB — BACTERIA UR CULT: NO GROWTH

## 2023-03-22 ENCOUNTER — OFFICE VISIT (OUTPATIENT)
Dept: PRIMARY CARE CLINIC | Facility: CLINIC | Age: 38
End: 2023-03-22
Payer: MEDICAID

## 2023-03-22 VITALS
OXYGEN SATURATION: 99 % | HEART RATE: 68 BPM | BODY MASS INDEX: 22.85 KG/M2 | DIASTOLIC BLOOD PRESSURE: 55 MMHG | HEIGHT: 64 IN | SYSTOLIC BLOOD PRESSURE: 108 MMHG | WEIGHT: 133.81 LBS

## 2023-03-22 DIAGNOSIS — R10.13 EPIGASTRIC PAIN: ICD-10-CM

## 2023-03-22 DIAGNOSIS — R82.90 ABNORMAL URINALYSIS: ICD-10-CM

## 2023-03-22 DIAGNOSIS — K76.9 LIVER DISEASE, UNSPECIFIED: ICD-10-CM

## 2023-03-22 DIAGNOSIS — R79.9 ABNORMAL BLOOD CHEMISTRY: Primary | ICD-10-CM

## 2023-03-22 PROCEDURE — 99999 PR PBB SHADOW E&M-EST. PATIENT-LVL III: ICD-10-PCS | Mod: PBBFAC,,, | Performed by: STUDENT IN AN ORGANIZED HEALTH CARE EDUCATION/TRAINING PROGRAM

## 2023-03-22 PROCEDURE — 99213 OFFICE O/P EST LOW 20 MIN: CPT | Mod: PBBFAC,PN | Performed by: STUDENT IN AN ORGANIZED HEALTH CARE EDUCATION/TRAINING PROGRAM

## 2023-03-22 PROCEDURE — 99204 PR OFFICE/OUTPT VISIT, NEW, LEVL IV, 45-59 MIN: ICD-10-PCS | Mod: S$PBB,,, | Performed by: STUDENT IN AN ORGANIZED HEALTH CARE EDUCATION/TRAINING PROGRAM

## 2023-03-22 PROCEDURE — 3008F PR BODY MASS INDEX (BMI) DOCUMENTED: ICD-10-PCS | Mod: CPTII,,, | Performed by: STUDENT IN AN ORGANIZED HEALTH CARE EDUCATION/TRAINING PROGRAM

## 2023-03-22 PROCEDURE — 99204 OFFICE O/P NEW MOD 45 MIN: CPT | Mod: S$PBB,,, | Performed by: STUDENT IN AN ORGANIZED HEALTH CARE EDUCATION/TRAINING PROGRAM

## 2023-03-22 PROCEDURE — 1159F PR MEDICATION LIST DOCUMENTED IN MEDICAL RECORD: ICD-10-PCS | Mod: CPTII,,, | Performed by: STUDENT IN AN ORGANIZED HEALTH CARE EDUCATION/TRAINING PROGRAM

## 2023-03-22 PROCEDURE — 3008F BODY MASS INDEX DOCD: CPT | Mod: CPTII,,, | Performed by: STUDENT IN AN ORGANIZED HEALTH CARE EDUCATION/TRAINING PROGRAM

## 2023-03-22 PROCEDURE — 3074F PR MOST RECENT SYSTOLIC BLOOD PRESSURE < 130 MM HG: ICD-10-PCS | Mod: CPTII,,, | Performed by: STUDENT IN AN ORGANIZED HEALTH CARE EDUCATION/TRAINING PROGRAM

## 2023-03-22 PROCEDURE — 3074F SYST BP LT 130 MM HG: CPT | Mod: CPTII,,, | Performed by: STUDENT IN AN ORGANIZED HEALTH CARE EDUCATION/TRAINING PROGRAM

## 2023-03-22 PROCEDURE — 1159F MED LIST DOCD IN RCRD: CPT | Mod: CPTII,,, | Performed by: STUDENT IN AN ORGANIZED HEALTH CARE EDUCATION/TRAINING PROGRAM

## 2023-03-22 PROCEDURE — 3078F DIAST BP <80 MM HG: CPT | Mod: CPTII,,, | Performed by: STUDENT IN AN ORGANIZED HEALTH CARE EDUCATION/TRAINING PROGRAM

## 2023-03-22 PROCEDURE — 99999 PR PBB SHADOW E&M-EST. PATIENT-LVL III: CPT | Mod: PBBFAC,,, | Performed by: STUDENT IN AN ORGANIZED HEALTH CARE EDUCATION/TRAINING PROGRAM

## 2023-03-22 PROCEDURE — 3078F PR MOST RECENT DIASTOLIC BLOOD PRESSURE < 80 MM HG: ICD-10-PCS | Mod: CPTII,,, | Performed by: STUDENT IN AN ORGANIZED HEALTH CARE EDUCATION/TRAINING PROGRAM

## 2023-03-22 RX ORDER — PANTOPRAZOLE SODIUM 40 MG/1
40 TABLET, DELAYED RELEASE ORAL DAILY
Qty: 30 TABLET | Refills: 1 | Status: SHIPPED | OUTPATIENT
Start: 2023-03-22 | End: 2024-03-25

## 2023-03-22 NOTE — PROGRESS NOTES
03/22/2023    Katey Cedillo  [id number]    Chief Complaint   Patient presents with    [facility]       HPI    This pt is new to me and presents for ED follow up from 2 months ago. No sig PMH. Presented to the ED with abd and flank pain. Dx with pyelonephritis and prescribed bactrim. Pt states she never received any of the medication prescribed, including abx. Work up sig for abnormal RUQ US with 1.4 cm possible liver hemangioma, recommended MRI for further detail. Since then with continued epigastric pain, but not as bad. Still unable to lay on stomach. Is tolerating regular diet. Denies constipation or diarrhea.       Negative 10 point ROS outside of HPI    Social History     Socioeconomic History    Marital status: Single     Spouse name: Alvarado-partner   Occupational History    Occupation: [employer]   Tobacco Use    Smoking status: Never    Smokeless tobacco: Never   Substance and Sexual Activity    Alcohol use: Not Currently    Drug use: Never    Sexual activity: Yes     Partners: Male     Comment: Nemo           Current Outpatient Medications:     dicyclomine (BENTYL) 20 mg tablet, Take 1 tablet (20 mg total) by mouth 2 (two) times daily as needed (abdominal pain). (Patient not taking: Reported on 3/22/2023), Disp: 10 tablet, Rfl: 0    famotidine (PEPCID) 20 MG tablet, Take 1 tablet (20 mg total) by mouth 2 (two) times daily. for 14 days, Disp: 28 tablet, Rfl: 0    ferrous sulfate 324 mg (65 mg iron) TbEC, Take 1 tablet (324 mg total) by mouth once daily. (Patient not taking: Reported on 3/22/2023), Disp: , Rfl: 0    ibuprofen (ADVIL,MOTRIN) 600 MG tablet, Take 1 tablet (600 mg total) by mouth every 6 (six) hours as needed (cramping). (Patient not taking: Reported on 3/22/2023), Disp: 30 tablet, Rfl: 0    ondansetron (ZOFRAN-ODT) 4 MG TbDL, Take 1 tablet (4 mg total) by mouth every 8 (eight) hours as needed (nausea or vomiting). (Patient not taking: Reported on 3/22/2023), Disp: 12 tablet, Rfl: 0     pantoprazole (PROTONIX) 20 MG tablet, Take 1 tablet (20 mg total) by mouth once daily. (Patient not taking: Reported on 3/22/2023), Disp: 30 tablet, Rfl: 0    prenatal vit,calc76/iron/folic (PNV 29-1 ORAL), Take 1 tablet by mouth once daily., Disp: , Rfl:       Physical Exam  Vitals:    03/22/23 1056   BP: (!) 108/55   Pulse: 68       Gen: well appearing, NAD  Resp: non labored breathing, no crackles, no wheezes, CTAB  CV: RRR no murmur, gallops, rubs, no LE edema  Abd: soft epigastric tender to palpation no organomegaly    1. Abnormal blood chemistry  Last cmp with electrolyte disturbance  - Comprehensive Metabolic Panel; Future    2. Abnormal urinalysis  Reviewed UA form 1/23 consistent with dehydration and UTI; Urine culture negative  - Urinalysis, Reflex to Urine Culture Urine, Clean Catch; Future    3. Liver disease, unspecified  Reviewed US RUQ on 1/23  1. The gallbladder is unremarkable.  2. Incidentally noted 1.4 cm hyperechoic lesion in the right hepatic lobe, may represent hemangioma although nonspecific.  On a nonemergent basis, further evaluation could be obtained with contrast enhanced MRI as clinically warranted.   - MRI Abdomen With Contrast; Future    4. Epigastric pain  - START pantoprazole (PROTONIX) 40 MG tablet; Take 1 tablet (40 mg total) by mouth once daily.  Dispense: 30 tablet; Refill: 1  -if no improvement in 4-6 weeks will refer to gastroenterology    RTC for routine care    Ya Giles MD  Family Medicine

## 2023-04-12 ENCOUNTER — PATIENT MESSAGE (OUTPATIENT)
Dept: ADMINISTRATIVE | Facility: HOSPITAL | Age: 38
End: 2023-04-12
Payer: MEDICAID

## 2023-08-24 ENCOUNTER — PATIENT MESSAGE (OUTPATIENT)
Dept: PRIMARY CARE CLINIC | Facility: CLINIC | Age: 38
End: 2023-08-24
Payer: MEDICAID

## 2024-02-20 ENCOUNTER — PATIENT MESSAGE (OUTPATIENT)
Dept: ADMINISTRATIVE | Facility: HOSPITAL | Age: 39
End: 2024-02-20
Payer: MEDICAID

## 2024-02-26 ENCOUNTER — PATIENT OUTREACH (OUTPATIENT)
Dept: ADMINISTRATIVE | Facility: HOSPITAL | Age: 39
End: 2024-02-26
Payer: MEDICAID

## 2024-02-26 ENCOUNTER — OFFICE VISIT (OUTPATIENT)
Dept: OBSTETRICS AND GYNECOLOGY | Facility: CLINIC | Age: 39
End: 2024-02-26
Payer: MEDICAID

## 2024-02-26 ENCOUNTER — TELEPHONE (OUTPATIENT)
Dept: OBSTETRICS AND GYNECOLOGY | Facility: CLINIC | Age: 39
End: 2024-02-26

## 2024-02-26 ENCOUNTER — PROCEDURE VISIT (OUTPATIENT)
Dept: OBSTETRICS AND GYNECOLOGY | Facility: CLINIC | Age: 39
End: 2024-02-26
Payer: MEDICAID

## 2024-02-26 VITALS
DIASTOLIC BLOOD PRESSURE: 58 MMHG | SYSTOLIC BLOOD PRESSURE: 116 MMHG | WEIGHT: 134.69 LBS | BODY MASS INDEX: 23.12 KG/M2

## 2024-02-26 DIAGNOSIS — Z34.90 PREGNANCY, UNSPECIFIED GESTATIONAL AGE: ICD-10-CM

## 2024-02-26 DIAGNOSIS — O21.9 NAUSEA AND VOMITING DURING PREGNANCY: ICD-10-CM

## 2024-02-26 DIAGNOSIS — Z13.220 SCREENING FOR CHOLESTEROL LEVEL: ICD-10-CM

## 2024-02-26 DIAGNOSIS — O09.529 ANTEPARTUM MULTIGRAVIDA OF ADVANCED MATERNAL AGE: ICD-10-CM

## 2024-02-26 DIAGNOSIS — N91.2 AMENORRHEA: Primary | ICD-10-CM

## 2024-02-26 DIAGNOSIS — Z13.220 SCREENING FOR CHOLESTEROL LEVEL: Primary | ICD-10-CM

## 2024-02-26 LAB
ABO + RH BLD: NORMAL
ANION GAP SERPL CALC-SCNC: 9 MMOL/L (ref 8–16)
BASOPHILS # BLD AUTO: 0.07 K/UL (ref 0–0.2)
BASOPHILS NFR BLD: 0.8 % (ref 0–1.9)
BLD GP AB SCN CELLS X3 SERPL QL: NORMAL
BUN SERPL-MCNC: 9 MG/DL (ref 6–20)
CALCIUM SERPL-MCNC: 9.5 MG/DL (ref 8.7–10.5)
CHLORIDE SERPL-SCNC: 107 MMOL/L (ref 95–110)
CHOLEST SERPL-MCNC: 156 MG/DL (ref 120–199)
CHOLEST/HDLC SERPL: 3.2 {RATIO} (ref 2–5)
CO2 SERPL-SCNC: 22 MMOL/L (ref 23–29)
CREAT SERPL-MCNC: 0.7 MG/DL (ref 0.5–1.4)
DIFFERENTIAL METHOD BLD: ABNORMAL
EOSINOPHIL # BLD AUTO: 0.2 K/UL (ref 0–0.5)
EOSINOPHIL NFR BLD: 2.4 % (ref 0–8)
ERYTHROCYTE [DISTWIDTH] IN BLOOD BY AUTOMATED COUNT: 13.4 % (ref 11.5–14.5)
EST. GFR  (NO RACE VARIABLE): >60 ML/MIN/1.73 M^2
GLUCOSE SERPL-MCNC: 69 MG/DL (ref 70–110)
HBV SURFACE AG SERPL QL IA: NORMAL
HCT VFR BLD AUTO: 38 % (ref 37–48.5)
HCV AB SERPL QL IA: NORMAL
HDLC SERPL-MCNC: 49 MG/DL (ref 40–75)
HDLC SERPL: 31.4 % (ref 20–50)
HGB BLD-MCNC: 12.6 G/DL (ref 12–16)
HIV 1+2 AB+HIV1 P24 AG SERPL QL IA: NORMAL
IMM GRANULOCYTES # BLD AUTO: 0.05 K/UL (ref 0–0.04)
IMM GRANULOCYTES NFR BLD AUTO: 0.6 % (ref 0–0.5)
LDLC SERPL CALC-MCNC: 95 MG/DL (ref 63–159)
LYMPHOCYTES # BLD AUTO: 1.9 K/UL (ref 1–4.8)
LYMPHOCYTES NFR BLD: 21.9 % (ref 18–48)
MCH RBC QN AUTO: 30.4 PG (ref 27–31)
MCHC RBC AUTO-ENTMCNC: 33.2 G/DL (ref 32–36)
MCV RBC AUTO: 92 FL (ref 82–98)
MONOCYTES # BLD AUTO: 0.5 K/UL (ref 0.3–1)
MONOCYTES NFR BLD: 5.9 % (ref 4–15)
NEUTROPHILS # BLD AUTO: 6.1 K/UL (ref 1.8–7.7)
NEUTROPHILS NFR BLD: 68.4 % (ref 38–73)
NONHDLC SERPL-MCNC: 107 MG/DL
NRBC BLD-RTO: 0 /100 WBC
PLATELET # BLD AUTO: 296 K/UL (ref 150–450)
PMV BLD AUTO: 10.2 FL (ref 9.2–12.9)
POTASSIUM SERPL-SCNC: 4 MMOL/L (ref 3.5–5.1)
RBC # BLD AUTO: 4.14 M/UL (ref 4–5.4)
SODIUM SERPL-SCNC: 138 MMOL/L (ref 136–145)
SPECIMEN OUTDATE: NORMAL
TRIGL SERPL-MCNC: 60 MG/DL (ref 30–150)
WBC # BLD AUTO: 8.84 K/UL (ref 3.9–12.7)

## 2024-02-26 PROCEDURE — 99203 OFFICE O/P NEW LOW 30 MIN: CPT | Mod: S$PBB,TH,, | Performed by: ADVANCED PRACTICE MIDWIFE

## 2024-02-26 PROCEDURE — 86762 RUBELLA ANTIBODY: CPT | Performed by: ADVANCED PRACTICE MIDWIFE

## 2024-02-26 PROCEDURE — 1159F MED LIST DOCD IN RCRD: CPT | Mod: CPTII,,, | Performed by: ADVANCED PRACTICE MIDWIFE

## 2024-02-26 PROCEDURE — 3078F DIAST BP <80 MM HG: CPT | Mod: CPTII,,, | Performed by: ADVANCED PRACTICE MIDWIFE

## 2024-02-26 PROCEDURE — 99999 PR PBB SHADOW E&M-EST. PATIENT-LVL III: CPT | Mod: PBBFAC,,, | Performed by: ADVANCED PRACTICE MIDWIFE

## 2024-02-26 PROCEDURE — 86592 SYPHILIS TEST NON-TREP QUAL: CPT | Performed by: ADVANCED PRACTICE MIDWIFE

## 2024-02-26 PROCEDURE — 99999PBSHW POCT URINE PREGNANCY: Mod: PBBFAC,,,

## 2024-02-26 PROCEDURE — 3008F BODY MASS INDEX DOCD: CPT | Mod: CPTII,,, | Performed by: ADVANCED PRACTICE MIDWIFE

## 2024-02-26 PROCEDURE — 87340 HEPATITIS B SURFACE AG IA: CPT | Performed by: ADVANCED PRACTICE MIDWIFE

## 2024-02-26 PROCEDURE — 81025 URINE PREGNANCY TEST: CPT | Mod: PBBFAC,PN | Performed by: ADVANCED PRACTICE MIDWIFE

## 2024-02-26 PROCEDURE — 85025 COMPLETE CBC W/AUTO DIFF WBC: CPT | Performed by: ADVANCED PRACTICE MIDWIFE

## 2024-02-26 PROCEDURE — 86901 BLOOD TYPING SEROLOGIC RH(D): CPT | Performed by: ADVANCED PRACTICE MIDWIFE

## 2024-02-26 PROCEDURE — 86803 HEPATITIS C AB TEST: CPT | Performed by: ADVANCED PRACTICE MIDWIFE

## 2024-02-26 PROCEDURE — 87491 CHLMYD TRACH DNA AMP PROBE: CPT | Performed by: ADVANCED PRACTICE MIDWIFE

## 2024-02-26 PROCEDURE — 86787 VARICELLA-ZOSTER ANTIBODY: CPT | Performed by: ADVANCED PRACTICE MIDWIFE

## 2024-02-26 PROCEDURE — 99213 OFFICE O/P EST LOW 20 MIN: CPT | Mod: PBBFAC,TH,PN,25 | Performed by: ADVANCED PRACTICE MIDWIFE

## 2024-02-26 PROCEDURE — 87624 HPV HI-RISK TYP POOLED RSLT: CPT | Performed by: ADVANCED PRACTICE MIDWIFE

## 2024-02-26 PROCEDURE — 80061 LIPID PANEL: CPT | Performed by: STUDENT IN AN ORGANIZED HEALTH CARE EDUCATION/TRAINING PROGRAM

## 2024-02-26 PROCEDURE — 3074F SYST BP LT 130 MM HG: CPT | Mod: CPTII,,, | Performed by: ADVANCED PRACTICE MIDWIFE

## 2024-02-26 PROCEDURE — 80048 BASIC METABOLIC PNL TOTAL CA: CPT | Performed by: ADVANCED PRACTICE MIDWIFE

## 2024-02-26 PROCEDURE — 88175 CYTOPATH C/V AUTO FLUID REDO: CPT | Performed by: ADVANCED PRACTICE MIDWIFE

## 2024-02-26 PROCEDURE — 87086 URINE CULTURE/COLONY COUNT: CPT | Performed by: ADVANCED PRACTICE MIDWIFE

## 2024-02-26 PROCEDURE — 87389 HIV-1 AG W/HIV-1&-2 AB AG IA: CPT | Performed by: ADVANCED PRACTICE MIDWIFE

## 2024-02-26 RX ORDER — ONDANSETRON 8 MG/1
8 TABLET, ORALLY DISINTEGRATING ORAL EVERY 12 HOURS PRN
Qty: 20 TABLET | Refills: 1 | Status: SHIPPED | OUTPATIENT
Start: 2024-02-26 | End: 2024-02-26

## 2024-02-26 RX ORDER — ONDANSETRON 8 MG/1
8 TABLET, ORALLY DISINTEGRATING ORAL EVERY 12 HOURS PRN
Qty: 30 TABLET | Refills: 1 | Status: SHIPPED | OUTPATIENT
Start: 2024-02-26

## 2024-02-26 NOTE — PROGRESS NOTES
HISTORY OF PRESENT ILLNESS:    Katey Cedillo is a 38 y.o. female, ,  Patient's last menstrual period was 2023.  for a routine exam complaining of amenorrhea.    Pt reports daily nausea/ vomiting.    Past Medical History:   Diagnosis Date    Depression     --alternative treatments ( hospitalization x 1)     Pilonidal cyst with abscess 2019    I&D  during pregnancy       History reviewed. No pertinent surgical history.    MEDICATIONS AND ALLERGIES:      Current Outpatient Medications:     dicyclomine (BENTYL) 20 mg tablet, Take 1 tablet (20 mg total) by mouth 2 (two) times daily as needed (abdominal pain). (Patient not taking: Reported on 3/22/2023), Disp: 10 tablet, Rfl: 0    ferrous sulfate 324 mg (65 mg iron) TbEC, Take 1 tablet (324 mg total) by mouth once daily. (Patient not taking: Reported on 3/22/2023), Disp: , Rfl: 0    ondansetron (ZOFRAN-ODT) 4 MG TbDL, Take 1 tablet (4 mg total) by mouth every 8 (eight) hours as needed (nausea or vomiting). (Patient not taking: Reported on 3/22/2023), Disp: 12 tablet, Rfl: 0    pantoprazole (PROTONIX) 40 MG tablet, Take 1 tablet (40 mg total) by mouth once daily. (Patient not taking: Reported on 2024), Disp: 30 tablet, Rfl: 1    prenatal vit,calc76/iron/folic (PNV 29-1 ORAL), Take 1 tablet by mouth once daily., Disp: , Rfl:     Review of patient's allergies indicates:  No Known Allergies    Family History   Problem Relation Age of Onset    Breast cancer Paternal Grandmother         > 49 y/o at diagnosis    Breast cancer Cousin         40's at diagnosis    Hypertension Neg Hx     Cancer Neg Hx     Ovarian cancer Neg Hx     Colon cancer Neg Hx        Social History     Socioeconomic History    Marital status: Single     Spouse name: Alvarado-partner   Occupational History    Occupation: [employer]   Tobacco Use    Smoking status: Never    Smokeless tobacco: Never   Substance and Sexual Activity    Alcohol use: Not Currently    Drug use: Never    Sexual  activity: Yes     Partners: Male     Comment: Nemo     Social Determinants of Health     Stress: No Stress Concern Present (7/22/2019)    [employer] - Occupational Stress Questionnaire     Feeling of Stress : Only a little       COMPREHENSIVE GYN HISTORY:  PAP History: Denies abnormal Paps.  Infection History: Denies STDs. Denies PID.  Benign History: Denies uterine fibroids. Denies ovarian cysts. Denies endometriosis. Denies other conditions.  Cancer History: Denies cervical cancer. Denies uterine cancer or hyperplasia. Denies ovarian cancer. Denies vulvar cancer or pre-cancer. Denies vaginal cancer or pre-cancer. Denies breast cancer. Denies colon cancer.  Sexual Activity History: Reports currently being sexually active  Menstrual History: None.  Contraception: None    ROS:  GENERAL: No weight changes. No swelling. No fatigue. No fever.  CARDIOVASCULAR: No chest pain. No shortness of breath. No leg cramps.   NEUROLOGICAL: No headaches. No vision changes.  BREASTS: No pain. No lumps. No discharge.  ABDOMEN: No pain. No diarrhea. No constipation.  REPRODUCTIVE: No abnormal bleeding.   VULVA: No pain. No lesions. No itching.  VAGINA: No relaxation. No itching. No odor. No discharge. No lesions.  URINARY: No incontinence. No nocturia. No frequency. No dysuria.    BP (!) 116/58   Wt 61.1 kg (134 lb 11.2 oz)   LMP 12/30/2023   BMI 23.12 kg/m²     PE:  AFFECT: Alert and oriented X 3. Interactive during exam  GENERAL: Appearance well-nourished, well-developed, in no acute distress.  HEENT: WNL  TEETH: Good dentition.  THYROID: No thyromegally   LUNGS: Easy and unlabored  HEART: Regular rate and rhythm   ABDOMEN: Soft and nontender without masses or organomegally.  EXTREMITIES: No cyanosis, clubbing or edema.   SKIN: No lesions or rashes.  VULVA: No lesions, masses or tenderness.  VAGINA: Moist and well rugated without lesions or discharge.  CERVIX: Moist and pink without lesions,  discharge or tenderness.      UTERUS SIZE: nontender and without masses.  ADNEXA: No masses or tenderness.  RECTUM: Deferred.  ESTIMATE OF PELVIC CAPACITY: Adequate    PROCEDURES:  UPT Positive  Genprobe  Pap    DIAGNOSIS:  Gyn exam  IUP with stated LMP of 12/30/23- dating scan ordered    PLAN:Routine prenatal care    MEDICATIONS PRESCRIBED:Zofran 8mg PO q 12 hrs prn nausea/ vomiting    LABS AND TESTS ORDERED:  New Ob Labs      NEW PREGNANCY COUNSELING  Patient was counseled today on:  - Routine prenatal blood tests including HIV and anticipated course of prenatal care  - Prenatal vitamins and folic acid  - Weight gain, nutrition, and exercise  - Seafood and mercury  - Properly heating deli and prepared meats and avoiding unrefrigerated deli  meats, cheeses, and milk products,   - Avoiding cat litter and raw meats due to risk of Toxoplasmosis precautions   - Accuracy of the LMP-based JASWANT and the value of an early TV-u/s  - Aneuploidy and neural tube screening -- cffDNA, sequential screening, and AFP screen at 15 weeks  - OTC medication in the first trimester  - Harmful effects of smoking, etOH, and recreational drugs  - M u/s  at 18-20 weeks.  - Common complaints of pregnancy  - Seat belt use  - Childbirth classes and hospital facilities  - All questions were answered    TERATOLOGY COUNSELING:   Discussed indications and options for aneuploidy screening - pamphlets given    - Pain and bleeding precautions given    - Return to clinic in 4 weeks    Sri Avendano CNM    OB/GYN    Total time of 30 minutes, including face-to-face time and non-face-to-face time preparing to see the patient (eg, review of tests), obtaining and/or reviewing separately obtained history, documenting clinical information in the electronic or other health record, independently interpreting results, communicating results to the patient/family/caregiver, or care coordination.

## 2024-02-26 NOTE — TELEPHONE ENCOUNTER
----- Message from Yara Garcia sent at 2/26/2024  1:28 PM CST -----  Name of Who is Calling:TAE TEE [[id number]]                What is the request in detail:Pt calling because the pharmacy that her medication ondansetron (ZOFRAN-ODT) 8 MG TbDL was sent to doesn't have the medication so she wanted to change the location to the one below.Please call back to further assist.          [facility]: #[id number] [address], [place] [address]      Can the clinic reply by [facility]: No                What Number to Call Back if not in [facility]: [phone]    
R/X sent to new pharmacy.   
- - -

## 2024-02-27 LAB
BACTERIA UR CULT: NORMAL
BACTERIA UR CULT: NORMAL
C TRACH DNA SPEC QL NAA+PROBE: NOT DETECTED
N GONORRHOEA DNA SPEC QL NAA+PROBE: NOT DETECTED
RPR SER QL: NORMAL
RUBV IGG SER-ACNC: 65.4 IU/ML
RUBV IGG SER-IMP: REACTIVE
VARICELLA INTERPRETATION: POSITIVE
VARICELLA ZOSTER IGG: 2014 AU/ML

## 2024-02-28 ENCOUNTER — HOSPITAL ENCOUNTER (OUTPATIENT)
Dept: PERINATAL CARE | Facility: OTHER | Age: 39
Discharge: HOME | End: 2024-02-28
Attending: ADVANCED PRACTICE MIDWIFE
Payer: MEDICAID

## 2024-02-28 DIAGNOSIS — Z34.90 PREGNANCY, UNSPECIFIED GESTATIONAL AGE: ICD-10-CM

## 2024-02-28 PROCEDURE — 76801 OB US < 14 WKS SINGLE FETUS: CPT | Mod: 26,,, | Performed by: OBSTETRICS & GYNECOLOGY

## 2024-02-28 PROCEDURE — 76801 OB US < 14 WKS SINGLE FETUS: CPT

## 2024-03-01 LAB
FINAL PATHOLOGIC DIAGNOSIS: NORMAL
Lab: NORMAL

## 2024-03-07 LAB
HPV HR 12 DNA SPEC QL NAA+PROBE: NEGATIVE
HPV16 AG SPEC QL: NEGATIVE
HPV18 DNA SPEC QL NAA+PROBE: NEGATIVE

## 2024-03-13 ENCOUNTER — HOSPITAL ENCOUNTER (EMERGENCY)
Facility: OTHER | Age: 39
Discharge: HOME | End: 2024-03-13
Attending: EMERGENCY MEDICINE
Payer: MEDICAID

## 2024-03-13 VITALS
TEMPERATURE: 98 F | HEART RATE: 79 BPM | WEIGHT: 130 LBS | RESPIRATION RATE: 20 BRPM | OXYGEN SATURATION: 100 % | SYSTOLIC BLOOD PRESSURE: 104 MMHG | DIASTOLIC BLOOD PRESSURE: 56 MMHG | BODY MASS INDEX: 22.31 KG/M2

## 2024-03-13 DIAGNOSIS — O46.8X1 SUBCHORIONIC HEMORRHAGE OF PLACENTA IN FIRST TRIMESTER, SINGLE OR UNSPECIFIED FETUS: ICD-10-CM

## 2024-03-13 DIAGNOSIS — O20.0 THREATENED ABORTION: Primary | ICD-10-CM

## 2024-03-13 DIAGNOSIS — O41.8X10 SUBCHORIONIC HEMORRHAGE OF PLACENTA IN FIRST TRIMESTER, SINGLE OR UNSPECIFIED FETUS: ICD-10-CM

## 2024-03-13 LAB
ALBUMIN SERPL BCP-MCNC: 4.1 G/DL (ref 3.5–5.2)
ALP SERPL-CCNC: 48 U/L (ref 55–135)
ALT SERPL W/O P-5'-P-CCNC: 23 U/L (ref 10–44)
ANION GAP SERPL CALC-SCNC: 7 MMOL/L (ref 8–16)
AST SERPL-CCNC: 17 U/L (ref 10–40)
B-HCG UR QL: POSITIVE
BACTERIA #/AREA URNS HPF: ABNORMAL /HPF
BACTERIA GENITAL QL WET PREP: ABNORMAL
BASOPHILS # BLD AUTO: 0.04 K/UL (ref 0–0.2)
BASOPHILS NFR BLD: 0.5 % (ref 0–1.9)
BILIRUB SERPL-MCNC: 0.5 MG/DL (ref 0.1–1)
BILIRUB UR QL STRIP: NEGATIVE
BUN SERPL-MCNC: 6 MG/DL (ref 6–20)
CALCIUM SERPL-MCNC: 9.4 MG/DL (ref 8.7–10.5)
CHLORIDE SERPL-SCNC: 106 MMOL/L (ref 95–110)
CLARITY UR: ABNORMAL
CLUE CELLS VAG QL WET PREP: ABNORMAL
CO2 SERPL-SCNC: 24 MMOL/L (ref 23–29)
COLOR UR: YELLOW
CREAT SERPL-MCNC: 0.7 MG/DL (ref 0.5–1.4)
CTP QC/QA: YES
DIFFERENTIAL METHOD BLD: ABNORMAL
EOSINOPHIL # BLD AUTO: 0.1 K/UL (ref 0–0.5)
EOSINOPHIL NFR BLD: 0.6 % (ref 0–8)
ERYTHROCYTE [DISTWIDTH] IN BLOOD BY AUTOMATED COUNT: 13 % (ref 11.5–14.5)
EST. GFR  (NO RACE VARIABLE): >60 ML/MIN/1.73 M^2
FILAMENT FUNGI VAG WET PREP-#/AREA: ABNORMAL
GLUCOSE SERPL-MCNC: 94 MG/DL (ref 70–110)
GLUCOSE UR QL STRIP: NEGATIVE
HCG INTACT+B SERPL-ACNC: NORMAL MIU/ML
HCT VFR BLD AUTO: 35 % (ref 37–48.5)
HGB BLD-MCNC: 11.9 G/DL (ref 12–16)
HGB UR QL STRIP: ABNORMAL
HYALINE CASTS #/AREA URNS LPF: 0 /LPF
IMM GRANULOCYTES # BLD AUTO: 0.02 K/UL (ref 0–0.04)
IMM GRANULOCYTES NFR BLD AUTO: 0.2 % (ref 0–0.5)
KETONES UR QL STRIP: ABNORMAL
LEUKOCYTE ESTERASE UR QL STRIP: ABNORMAL
LYMPHOCYTES # BLD AUTO: 1.9 K/UL (ref 1–4.8)
LYMPHOCYTES NFR BLD: 21.9 % (ref 18–48)
MCH RBC QN AUTO: 30.8 PG (ref 27–31)
MCHC RBC AUTO-ENTMCNC: 34 G/DL (ref 32–36)
MCV RBC AUTO: 91 FL (ref 82–98)
MICROSCOPIC COMMENT: ABNORMAL
MONOCYTES # BLD AUTO: 0.5 K/UL (ref 0.3–1)
MONOCYTES NFR BLD: 6 % (ref 4–15)
NEUTROPHILS # BLD AUTO: 6.2 K/UL (ref 1.8–7.7)
NEUTROPHILS NFR BLD: 70.8 % (ref 38–73)
NITRITE UR QL STRIP: NEGATIVE
NRBC BLD-RTO: 0 /100 WBC
PH UR STRIP: 6 [PH] (ref 5–8)
PLATELET # BLD AUTO: 261 K/UL (ref 150–450)
PMV BLD AUTO: 9.2 FL (ref 9.2–12.9)
POTASSIUM SERPL-SCNC: 3.8 MMOL/L (ref 3.5–5.1)
PROT SERPL-MCNC: 7.6 G/DL (ref 6–8.4)
PROT UR QL STRIP: ABNORMAL
RBC # BLD AUTO: 3.86 M/UL (ref 4–5.4)
RBC #/AREA URNS HPF: >100 /HPF (ref 0–4)
SODIUM SERPL-SCNC: 137 MMOL/L (ref 136–145)
SP GR UR STRIP: 1.03 (ref 1–1.03)
SPECIMEN SOURCE: ABNORMAL
SQUAMOUS #/AREA URNS HPF: 11 /HPF
T VAGINALIS GENITAL QL WET PREP: ABNORMAL
URN SPEC COLLECT METH UR: ABNORMAL
UROBILINOGEN UR STRIP-ACNC: NEGATIVE EU/DL
WBC # BLD AUTO: 8.69 K/UL (ref 3.9–12.7)
WBC #/AREA URNS HPF: 37 /HPF (ref 0–5)
WBC #/AREA VAG WET PREP: ABNORMAL
YEAST GENITAL QL WET PREP: ABNORMAL

## 2024-03-13 PROCEDURE — 80053 COMPREHEN METABOLIC PANEL: CPT | Performed by: NURSE PRACTITIONER

## 2024-03-13 PROCEDURE — 85025 COMPLETE CBC W/AUTO DIFF WBC: CPT | Performed by: NURSE PRACTITIONER

## 2024-03-13 PROCEDURE — 81000 URINALYSIS NONAUTO W/SCOPE: CPT | Performed by: NURSE PRACTITIONER

## 2024-03-13 PROCEDURE — 87210 SMEAR WET MOUNT SALINE/INK: CPT | Performed by: PHYSICIAN ASSISTANT

## 2024-03-13 PROCEDURE — 99284 EMERGENCY DEPT VISIT MOD MDM: CPT | Mod: 25

## 2024-03-13 PROCEDURE — 84702 CHORIONIC GONADOTROPIN TEST: CPT | Performed by: NURSE PRACTITIONER

## 2024-03-13 PROCEDURE — 25000003 PHARM REV CODE 250: Performed by: PHYSICIAN ASSISTANT

## 2024-03-13 PROCEDURE — 87086 URINE CULTURE/COLONY COUNT: CPT | Performed by: NURSE PRACTITIONER

## 2024-03-13 PROCEDURE — 87491 CHLMYD TRACH DNA AMP PROBE: CPT | Performed by: PHYSICIAN ASSISTANT

## 2024-03-13 RX ORDER — ACETAMINOPHEN 500 MG
1000 TABLET ORAL
Status: COMPLETED | OUTPATIENT
Start: 2024-03-13 | End: 2024-03-13

## 2024-03-13 RX ORDER — PROMETHAZINE HYDROCHLORIDE 25 MG/1
25 TABLET ORAL EVERY 6 HOURS PRN
Qty: 15 TABLET | Refills: 0 | Status: SHIPPED | OUTPATIENT
Start: 2024-03-13

## 2024-03-13 RX ORDER — PROMETHAZINE HYDROCHLORIDE 25 MG/1
25 TABLET ORAL
Status: COMPLETED | OUTPATIENT
Start: 2024-03-13 | End: 2024-03-13

## 2024-03-13 RX ADMIN — PROMETHAZINE HYDROCHLORIDE 25 MG: 25 TABLET ORAL at 12:03

## 2024-03-13 RX ADMIN — ACETAMINOPHEN 1000 MG: 500 TABLET ORAL at 12:03

## 2024-03-13 NOTE — ED NOTES
Patient arrives to room 329 ambulating with Trinh, PCT and significant other alongside, pt oriented to room, bed, and bed controls, AAOx4, Resp. even and unlabored, NAD noted, all needs addressed, Call light within reach, SRs up x2, Bed in lowest position, Will continue to monitor.

## 2024-03-13 NOTE — ED NOTES
Attempted IV access successful flushed well, pt complained IV was hurting her, discontinue IV to left AC no infiltration noted, MITCHELL Hill aware

## 2024-03-13 NOTE — FIRST PROVIDER EVALUATION
Emergency Department TeleTriage Encounter Note      CHIEF COMPLAINT    Chief Complaint   Patient presents with    Vaginal Bleeding     Reports bright red vag bleeding and cramping, onset this a.m. +10 weeks pregnant,1 pad/hr.       VITAL SIGNS   Initial Vitals [03/13/24 1016]   BP Pulse Resp Temp SpO2   (!) 130/58 86 18 98.1 °F (36.7 °C) 100 %      MAP       --            ALLERGIES    Review of patient's allergies indicates:  No Known Allergies    PROVIDER TRIAGE NOTE  Verbal consent for the teletriage evaluation was given by the patient at the start of the evaluation.  All efforts will be made to maintain patient's privacy during the evaluation.      This is a teletriage evaluation of a 38 y.o. female presenting to the ED with c/o abdominal cramping and vaginal bleeding for a couple of days.  Approximately 10 weeks pregnant; LMP 12/2023. Midwife Sri Avendano.  B positive per chart.  Limited physical exam via telehealth: The patient is awake, alert, answering questions appropriately and is not in respiratory distress.  As the Teletriage provider, I performed an initial assessment and ordered appropriate labs and imaging studies, if any, to facilitate the patient's care once placed in the ED. Once a room is available, care and a full evaluation will be completed by an alternate ED provider.  Any additional orders and the final disposition will be determined by that provider.  All imaging and labs will not be followed-up by the Teletriage Team, including myself.          ORDERS  Labs Reviewed - No data to display    ED Orders (720h ago, onward)      Start Ordered     Status Ordering Provider    03/13/24 1045 03/13/24 1038  sodium chloride 0.9% bolus 1,000 mL 1,000 mL  ED 1 Time         Ordered DOUGLAS SOTO    03/13/24 1039 03/13/24 1038  Urinalysis, Reflex to Urine Culture Urine, Clean Catch  STAT         Ordered DOUGLAS SOTO    03/13/24 1039 03/13/24 1038  US OB <14 Wks TransAbd & TransVag, Single Gestation (XPD)  1  time imaging         Ordered BRITTANY, DOUGLAS TALBERT    03/13/24 1038 03/13/24 1038  Insert peripheral IV  Once         Ordered BRITTANY, DOUGLAS TALBERT    03/13/24 1038 03/13/24 1038  CBC W/ AUTO DIFFERENTIAL  STAT         Ordered BRITTANY, DOUGLAS TALBERT    03/13/24 1038 03/13/24 1038  Comp. Metabolic Panel  STAT         Ordered BRITTANY, DOUGLAS TALBERT    03/13/24 1038 03/13/24 1038  hCG, quantitative  STAT         Ordered BRITTANY, DOUGLAS TALBERT    03/13/24 1038 03/13/24 1038  Setup Pelvic Tray  Once         Ordered BRITTANYDOUGLAS              Virtual Visit Note: The provider triage portion of this emergency department evaluation and documentation was performed via [employer], a HIPAA-compliant telemedicine application, in concert with a tele-presenter in the room. A face to face patient evaluation with one of my colleagues will occur once the patient is placed in an emergency department room.      DISCLAIMER: This note was prepared with [email or web address] voice recognition transcription software. Garbled syntax, mangled pronouns, and other bizarre constructions may be attributed to that software system.

## 2024-03-13 NOTE — MEDICAL/APP STUDENT
History     Chief Complaint   Patient presents with    Vaginal Bleeding     Reports bright red vag bleeding and cramping, onset this a.m. +10 weeks pregnant,1 pad/hr.     This is a 38-year-old  female at 10 weeks gestation that presents for urgent evaluation of vaginal bleeding and lower abdominal cramping since 9:30 this morning. She reports a significant amount of bleeding that soaked through her clothes. The cramping has been constant since this morning. She reports associated nausea and headache. Denies fever, chills, vomiting, diarrhea, constipation, dysuria, chest pain, and shortness of breath.     The history is provided by the patient. No  was used.       Past Medical History:   Diagnosis Date    Depression     --alternative treatments ( hospitalization x 1)     Pilonidal cyst with abscess 2019    I&D  during pregnancy       No past surgical history on file.    Family History   Problem Relation Age of Onset    Breast cancer Paternal Grandmother         > 49 y/o at diagnosis    Breast cancer Cousin         40's at diagnosis    Hypertension Neg Hx     Cancer Neg Hx     Ovarian cancer Neg Hx     Colon cancer Neg Hx        Social History     Tobacco Use    Smoking status: Never    Smokeless tobacco: Never   Substance Use Topics    Alcohol use: Not Currently    Drug use: Never       Review of Systems   Constitutional:  Negative for chills and fever.   HENT:  Negative for sore throat.    Respiratory:  Negative for shortness of breath.    Cardiovascular:  Negative for chest pain.   Gastrointestinal:  Positive for nausea. Negative for constipation, diarrhea and vomiting.   Genitourinary:  Positive for pelvic pain and vaginal bleeding. Negative for difficulty urinating, dysuria, flank pain and vaginal discharge.   Musculoskeletal:  Negative for back pain.   Skin:  Negative for rash.   Neurological:  Positive for headaches. Negative for weakness.   Hematological:  Does not bruise/bleed  easily.       Physical Exam   BP (!) 130/58 (BP Location: Left arm, Patient Position: Sitting)   Pulse 83   Temp 98.3 °F (36.8 °C) (Oral)   Resp 18   Wt 59 kg (130 lb)   LMP 2023   SpO2 100%   BMI 22.31 kg/m²     Physical Exam    Vitals reviewed.  Constitutional: She appears well-developed and well-nourished.   HENT:   Head: Normocephalic and atraumatic.   Eyes: EOM are normal.   Neck: Neck supple.   Normal range of motion.  Cardiovascular:  Normal rate, regular rhythm and normal heart sounds.           Pulmonary/Chest: Breath sounds normal. No respiratory distress.   Abdominal: Abdomen is soft. Bowel sounds are normal. She exhibits no distension. There is no abdominal tenderness. There is no guarding.   Musculoskeletal:      Cervical back: Normal range of motion and neck supple.     Neurological: She is alert and oriented to person, place, and time.   Skin: Skin is warm and dry.   Psychiatric: She has a normal mood and affect.       ED Course   MDM  Number of Diagnoses or Management Options  Subchorionic hemorrhage of placenta in first trimester, single or unspecified fetus: new, needed workup  Threatened : new, needed workup  Diagnosis management comments:          Amount and/or Complexity of Data Reviewed  Clinical lab tests: ordered and reviewed  Tests in the radiology section of CPT®: ordered and reviewed  Tests in the medicine section of CPT®: ordered and reviewed  Discussion of test results with the performing providers: yes  Decide to obtain previous medical records or to obtain history from someone other than the patient: no  Obtain history from someone other than the patient: no  Review and summarize past medical records: yes  Discuss the patient with other providers: yes  Independent visualization of images, tracings, or specimens: yes    Risk of Complications, Morbidity, and/or Mortality  Presenting problems: moderate  Diagnostic procedures: minimal  Management options:  minimal    Patient Progress  Patient progress: stable       Initial assessment:  Urgent evaluation of a  38-year old female at 10 weeks gestation with vaginal bleeding and cramping for 3 hours. She reports moderate bleeding soaking through her clothes. Will obtain labs and ultrasound. Will perform pelvic exam if indicated.     Differential diagnosis includes but not limited to:  Spontaneous , subchorionic hemorrhage and less likely ectopic pregnancy, ruptured ectopic, PID, ovarian cyst, ovarian torsion     ED course:  Laboratory results show mild anemia with Hgb 11.9. B-hCG of 132,277. Ultrasound confirms intrauterine pregnancy at 10 weeks 5 days. Subchorionic hemorrhage estimated at 3cm based on imaging. Pelvic exam shows blood in the vaginal vault that was removed to obtain a better view of cervix, no rebleeding present. Cervical os visualized and closed. No cervical motion tenderness present. Case discussed with OB resident who recommends follow up in clinic. Pain is controlled at this time. Will send RX for phenergan for nausea. Return precautions discussed with patient. She agrees with plan and is comfortable with discharge at this time.

## 2024-03-13 NOTE — Clinical Note
"Katey"Katey" Mamadou was seen and treated in our emergency department on 3/13/2024.  She may return to work on 03/16/2024.       If you have any questions or concerns, please don't hesitate to call.      Liz Wise PA"

## 2024-03-13 NOTE — ED PROVIDER NOTES
Encounter Date: 3/13/2024       History     Chief Complaint   Patient presents with    Vaginal Bleeding     Reports bright red vag bleeding and cramping, onset this a.m. +10 weeks pregnant,1 pad/hr.     38 y.o. female presents the ED for evaluation of vaginal bleeding in pregnancy.  Patient follows with Ob and is currently 10 weeks pregnant.  Has had confirmed IUP.  States that she began with cramping a proximally 4 days ago.  States today she began with bleeding.  Reported 1 pad today.  Denies any significant passing of tissue.  Denies any lightheadedness or shortness of breath.  Denies any fever or chills.  Denies any vaginal discharge.  She has not tried any medications for the symptoms.    The history is provided by the patient.     Review of patient's allergies indicates:  No Known Allergies  Past Medical History:   Diagnosis Date    Depression     2011--alternative treatments ( hospitalization x 1)     Pilonidal cyst with abscess 2019    I&D  during pregnancy     No past surgical history on file.  Family History   Problem Relation Age of Onset    Breast cancer Paternal Grandmother         > 51 y/o at diagnosis    Breast cancer Cousin         40's at diagnosis    Hypertension Neg Hx     Cancer Neg Hx     Ovarian cancer Neg Hx     Colon cancer Neg Hx      Social History     Tobacco Use    Smoking status: Never    Smokeless tobacco: Never   Substance Use Topics    Alcohol use: Not Currently    Drug use: Never     Review of Systems  As per HPI  Physical Exam     Initial Vitals [03/13/24 1016]   BP Pulse Resp Temp SpO2   (!) 130/58 86 18 98.1 °F (36.7 °C) 100 %      MAP       --         Physical Exam    Nursing note and vitals reviewed.  Constitutional: Vital signs are normal. She appears well-developed and well-nourished. She is cooperative.  Non-toxic appearance. She does not appear ill. No distress.   HENT:   Head: Normocephalic and atraumatic.   Eyes: Conjunctivae and lids are normal.   Neck: Neck supple.    Cardiovascular:  Normal rate and regular rhythm.           Pulmonary/Chest: Breath sounds normal. No respiratory distress. She has no wheezes. She has no rhonchi.   Abdominal: Abdomen is soft. Bowel sounds are normal. There is abdominal tenderness.   Slight suprapubic tenderness.  No guarding, rebound or rigidity There is no rigidity and no guarding.   Genitourinary: Cervix exhibits no motion tenderness. Right adnexum displays no tenderness and no fullness. Left adnexum displays no tenderness and no fullness.    Vaginal bleeding present.      No vaginal discharge or tenderness.   There is bleeding in the vagina. No tenderness in the vagina.    Genitourinary Comments: Slight blood in the vault which was evacuated.  No rebleeding noted     Musculoskeletal:         General: Normal range of motion.      Cervical back: Neck supple. No rigidity.     Neurological: She is alert and oriented to person, place, and time. GCS eye subscore is 4. GCS verbal subscore is 5. GCS motor subscore is 6.   Skin: Skin is warm, dry and intact. No rash noted.   Psychiatric: She has a normal mood and affect. Her speech is normal and behavior is normal. Thought content normal.         ED Course   Procedures  Labs Reviewed   CBC W/ AUTO DIFFERENTIAL - Abnormal; Notable for the following components:       Result Value    RBC 3.86 (*)     Hemoglobin 11.9 (*)     Hematocrit 35.0 (*)     All other components within normal limits    Narrative:     Release to patient->Immediate   COMPREHENSIVE METABOLIC PANEL - Abnormal; Notable for the following components:    Alkaline Phosphatase 48 (*)     Anion Gap 7 (*)     All other components within normal limits    Narrative:     Release to patient->Immediate   URINALYSIS, REFLEX TO URINE CULTURE - Abnormal; Notable for the following components:    Appearance, UA Hazy (*)     Protein, UA 1+ (*)     Ketones, UA 1+ (*)     Occult Blood UA 3+ (*)     Leukocytes, UA 1+ (*)     All other components within  normal limits    Narrative:     Specimen Source->Urine   URINALYSIS MICROSCOPIC - Abnormal; Notable for the following components:    RBC, UA >100 (*)     WBC, UA 37 (*)     Bacteria Moderate (*)     All other components within normal limits    Narrative:     Specimen Source->Urine   CULTURE, URINE   VAGINAL SCREEN   C. TRACHOMATIS/N. GONORRHOEAE BY AMP DNA   HCG, QUANTITATIVE    Narrative:     Release to patient->Immediate          Imaging Results              US OB <14 Wks, TransAbd, Single Gestation (Final result)  Result time 03/13/24 12:10:12   Procedure changed from US OB <14 Wks TransAbd & TransVag, Single Gestation (XPD)     Final result by Chikis Hassan MD (03/13/24 12:10:12)                   Impression:      Single live intrauterine gestation at 10 weeks, 5 days.  Suspected subchorionic hemorrhage estimated at 3 cm.      Electronically signed by: Chikis Hsasan  Date:    03/13/2024  Time:    12:10               Narrative:    EXAMINATION:  US OB <14 WEEKS TRANSABDOM, SINGLE GESTATION    CLINICAL HISTORY:  Vag Bleeding;    TECHNIQUE:  Transabdominal imaging of the uterus and ovaries performed.    COMPARISON:  None    FINDINGS:  Uterus measures 11.4 x 8.1 x 8.3 cm.  Single intrauterine gestational sac with a mean sac diameter of 4.8 cm.  Single yolk sac measuring 0.4 cm.  Single fetal pole with a crown-rump length of 3.75 cm corresponding to a 10 week, 5 day gestation.  Fetal heart rate 160 beats per minute.    Suspected subchorionic hemorrhage estimated at 3 cm.    Neither ovary identified.  No significant free fluid in the pelvis.                                       Medications   sodium chloride 0.9% bolus 1,000 mL 1,000 mL (has no administration in time range)   promethazine tablet 25 mg (25 mg Oral Given 3/13/24 1223)   acetaminophen tablet 1,000 mg (1,000 mg Oral Given 3/13/24 1222)     Medical Decision Making  Amount and/or Complexity of Data Reviewed  Labs: ordered. Decision-making details  documented in ED Course.    Risk  OTC drugs.  Prescription drug management.               ED Course as of 03/13/24 1249   Wed Mar 13, 2024   1114 CBC W/ AUTO DIFFERENTIAL(!)  No significant anemia [LC]   1114 Historical chart review reveals Rh positive status [LC]      ED Course User Index  [LC] Liz Wise PA               Medical Decision Making:   History:   Old Medical Records: I decided to obtain old medical records.  Initial Assessment:   Typically well 38-year-old female presenting with new vaginal bleeding in early pregnancy.  Has a history of confirmed IUP.  One pad per hour today.  Reports some associated cramping.    Appears well in no significant distress.  Abdomen is soft with no distention or guarding or rebound.   exam with slight bleeding in vaginal vault.  Cervical os appears closed.  No uterine or adnexal tenderness  Differential Diagnosis:   Differential Diagnosis includes, but is not limited to:  Pregnancy complication (threatened AB, inevitable AB, incomplete Ab, missed AB, uterine rupture, ectopic pregnancy, placental abruption, placenta previa), ovarian cyst/torsion, STD, foreign body, trauma, normal menstruation.    Clinical Tests:   Lab Tests: Reviewed and Ordered  Radiological Study: Reviewed and Ordered  ED Management:  Patient seen in tele triage process were initial labs were ordered.  Slight anemia.  No additional significant abnormalities.  Urine with 1 leukocyte however very poor specimen and given no symptomology will not treat bacteriuria.  Ultrasound does reveal IUP at 10 weeks with good fetal heart rate activity at 160.  New finding of subchorionic measuring 3 cm.  Did discuss with OB who will reach out to her primary and as she has a appointment in the near future she was given strict return precautions.  Did not feel additional emergent workup indicated at this time.  We will send home with antiemetic for the occasional nausea related to pregnancy.  Encouraged she can  take Tylenol as needed for pain. Strict instructions to follow up with primary care physician or reference provided for further assessment and evaluation. Given instructions to return for any acute symptoms and verbalized understanding of this medical plan.               Clinical Impression:  Final diagnoses:  [O20.0] Threatened  (Primary)  [O41.8X10, O46.8X1] Subchorionic hemorrhage of placenta in first trimester, single or unspecified fetus          ED Disposition Condition    Discharge Stable          ED Prescriptions       Medication Sig Dispense Start Date End Date Auth. Provider    promethazine (PHENERGAN) 25 MG tablet Take 1 tablet (25 mg total) by mouth every 6 (six) hours as needed for Nausea. 15 tablet 3/13/2024 -- Liz Wise PA          Follow-up Information       Follow up With Specialties Details Why Contact Info    Ya Giles MD Family Medicine  Someone will call you to schedule an appointment [phone] [facility] [id number]  [phone]      [facility] Emergency Dept Emergency Medicine  If symptoms worsen [id number] [facility] [id number] [phone]             Liz Wise PA  [phone]

## 2024-03-14 LAB
C TRACH DNA SPEC QL NAA+PROBE: NOT DETECTED
N GONORRHOEA DNA SPEC QL NAA+PROBE: NOT DETECTED

## 2024-03-15 LAB
BACTERIA UR CULT: NORMAL
BACTERIA UR CULT: NORMAL

## 2024-03-25 ENCOUNTER — INITIAL PRENATAL (OUTPATIENT)
Dept: OBSTETRICS AND GYNECOLOGY | Facility: CLINIC | Age: 39
End: 2024-03-25
Payer: MEDICAID

## 2024-03-25 VITALS — SYSTOLIC BLOOD PRESSURE: 99 MMHG | DIASTOLIC BLOOD PRESSURE: 60 MMHG | WEIGHT: 130.75 LBS | BODY MASS INDEX: 22.44 KG/M2

## 2024-03-25 DIAGNOSIS — O09.899 SUPERVISION OF OTHER HIGH RISK PREGNANCY, ANTEPARTUM: Primary | ICD-10-CM

## 2024-03-25 PROBLEM — L05.91 PILONIDAL CYST: Status: RESOLVED | Noted: 2019-08-08 | Resolved: 2024-03-25

## 2024-03-25 PROBLEM — O09.529 ANTEPARTUM MULTIGRAVIDA OF ADVANCED MATERNAL AGE: Status: RESOLVED | Noted: 2024-02-26 | Resolved: 2024-03-25

## 2024-03-25 PROCEDURE — 99213 OFFICE O/P EST LOW 20 MIN: CPT | Mod: TH,S$PBB,, | Performed by: ADVANCED PRACTICE MIDWIFE

## 2024-03-25 PROCEDURE — 99212 OFFICE O/P EST SF 10 MIN: CPT | Mod: PBBFAC,TH,PN | Performed by: ADVANCED PRACTICE MIDWIFE

## 2024-03-25 PROCEDURE — 99999 PR PBB SHADOW E&M-EST. PATIENT-LVL II: CPT | Mod: PBBFAC,,, | Performed by: ADVANCED PRACTICE MIDWIFE

## 2024-03-25 NOTE — PROGRESS NOTES
Reason for visit: Initial Prenatal Visit      HPI:   38 y.o., at 12w4d by Estimated Date of Delivery: 10/3/24    In with report of continued N/V of pregnancy    - Contractions: denies  - Bleeding: denies  - Loss of fluid: denies  - Fetal movement: no  - Nausea: yes  - Vomiting: no  - Headache: no      Reviewed:    Past medical, surgical, social, family, and obstetric history: Reviewed and updated in EMR.  Medications: Reviewed and updated in EMR.  Allergies: Patient has no known allergies.    Pregnancy dating, labs, ultrasound reports, prenatal testing, and problem list: Reviewed and updated in EMR.  Outside records: na  Independent interpretation of tests: na  Discussion with another healthcare professional: na      Vitals: BP 99/60   Wt 59.3 kg (130 lb 11.7 oz)   LMP 2023   BMI 22.44 kg/m²     Physical exam:  GENERAL: No acute distress  ABD: Gravid      Assessment and Plan:    Supervision of other high risk pregnancy, antepartum        Continues on phenergan at night- advised to take Zofran in the morning as may alleviate some of the nausea  Mat 21 today     labor precautions given  Follow-up: 4 weeks      I spent a total of 20 minutes on the day of the visit. This includes face to face time and non-face to face time preparing to see the patient (eg, review of tests), Obtaining and/or reviewing separately obtained history, Documenting clinical information in the electronic or other health record, Independently interpreting results and communicating results to the patient/family/caregiver, or Care coordination.

## 2024-03-26 ENCOUNTER — PATIENT MESSAGE (OUTPATIENT)
Dept: ADMINISTRATIVE | Facility: OTHER | Age: 39
End: 2024-03-26
Payer: MEDICAID

## 2024-04-02 ENCOUNTER — TELEPHONE (OUTPATIENT)
Dept: OBSTETRICS AND GYNECOLOGY | Facility: CLINIC | Age: 39
End: 2024-04-02
Payer: MEDICAID

## 2024-04-02 NOTE — TELEPHONE ENCOUNTER
Spoke with pt in regards to MT21 results. Informed pt of neg results per provider. Gender envelope prepared for pt.

## 2024-04-02 NOTE — TELEPHONE ENCOUNTER
----- Message from Teresita Castaneda sent at 4/2/2024  4:23 PM CDT -----  Patient states that she just had a missed call from this office In regards to her Genetic test result.   Patient would like for someone to give her a call back for this matter.     Contact# [phone]

## 2024-04-18 ENCOUNTER — PATIENT MESSAGE (OUTPATIENT)
Dept: OTHER | Facility: OTHER | Age: 39
End: 2024-04-18
Payer: MEDICAID

## 2024-04-22 ENCOUNTER — PROCEDURE VISIT (OUTPATIENT)
Dept: OBSTETRICS AND GYNECOLOGY | Facility: CLINIC | Age: 39
End: 2024-04-22
Payer: MEDICAID

## 2024-04-22 ENCOUNTER — ROUTINE PRENATAL (OUTPATIENT)
Dept: OBSTETRICS AND GYNECOLOGY | Facility: CLINIC | Age: 39
End: 2024-04-22
Payer: MEDICAID

## 2024-04-22 VITALS — DIASTOLIC BLOOD PRESSURE: 60 MMHG | WEIGHT: 130.5 LBS | SYSTOLIC BLOOD PRESSURE: 114 MMHG | BODY MASS INDEX: 22.4 KG/M2

## 2024-04-22 DIAGNOSIS — Z34.80 SUPERVISION OF OTHER NORMAL PREGNANCY, ANTEPARTUM: ICD-10-CM

## 2024-04-22 DIAGNOSIS — Z34.80 SUPERVISION OF OTHER NORMAL PREGNANCY, ANTEPARTUM: Primary | ICD-10-CM

## 2024-04-22 PROCEDURE — 99213 OFFICE O/P EST LOW 20 MIN: CPT | Mod: TH,S$PBB,, | Performed by: ADVANCED PRACTICE MIDWIFE

## 2024-04-22 PROCEDURE — 99999 PR PBB SHADOW E&M-EST. PATIENT-LVL II: CPT | Mod: PBBFAC,,, | Performed by: ADVANCED PRACTICE MIDWIFE

## 2024-04-22 PROCEDURE — 99212 OFFICE O/P EST SF 10 MIN: CPT | Mod: PBBFAC,TH,PN | Performed by: ADVANCED PRACTICE MIDWIFE

## 2024-04-22 PROCEDURE — 82105 ALPHA-FETOPROTEIN SERUM: CPT | Performed by: ADVANCED PRACTICE MIDWIFE

## 2024-04-22 NOTE — PROGRESS NOTES
Reason for visit: Routine Prenatal Visit      HPI:   38 y.o., at 16w4d by Estimated Date of Delivery: 10/3/24    In with no c/o    - Contractions: denies  - Bleeding: denies  - Loss of fluid: denies  - Fetal movement: not yet  - Nausea: no  - Vomiting: no  - Headache: no      Reviewed:    Past medical, surgical, social, family, and obstetric history: Reviewed and updated in EMR.  Medications: Reviewed and updated in EMR.  Allergies: Patient has no known allergies.    Pregnancy dating, labs, ultrasound reports, prenatal testing, and problem list: Reviewed and updated in EMR.  Outside records: na  Independent interpretation of tests: na  Discussion with another healthcare professional: na      Vitals: /60   Wt 59.2 kg (130 lb 8.2 oz)   LMP 2023   BMI 22.40 kg/m²     Physical exam:  GENERAL: No acute distress  ABD: Gravid      Assessment and Plan:    Supervision of other normal pregnancy, antepartum  -     Maternal Screen AFP (Single Marker); Future; Expected date: 2024  -     US MFM Procedure (Viewpoint)-Future; Future; Expected date: 2024        AFP drawn today  Krystal scan ordered     labor precautions given  Follow-up: 4 weeks      I spent a total of 20 minutes on the day of the visit. This includes face to face time and non-face to face time preparing to see the patient (eg, review of tests), Obtaining and/or reviewing separately obtained history, Documenting clinical information in the electronic or other health record, Independently interpreting results and communicating results to the patient/family/caregiver, or Care coordination.

## 2024-04-25 ENCOUNTER — PATIENT MESSAGE (OUTPATIENT)
Dept: OTHER | Facility: OTHER | Age: 39
End: 2024-04-25
Payer: MEDICAID

## 2024-04-25 LAB
# FETUSES US: NORMAL
AFP INTERPRETATION: NORMAL
AFP MOM SERPL: 0.47
AFP SERPL-MCNC: 22.4 NG/ML
AFP SERPL-MCNC: NEGATIVE NG/ML
AGE AT DELIVERY: 37
GA (DAYS): 4 D
GA (WEEKS): 16 WK
GESTATIONAL AGE METHOD: NORMAL
IDDM PATIENT QL: NORMAL
SMOKING STATUS FTND: NO

## 2024-05-09 ENCOUNTER — PROCEDURE VISIT (OUTPATIENT)
Dept: MATERNAL FETAL MEDICINE | Facility: CLINIC | Age: 39
End: 2024-05-09
Payer: MEDICAID

## 2024-05-09 DIAGNOSIS — Z34.80 SUPERVISION OF OTHER NORMAL PREGNANCY, ANTEPARTUM: ICD-10-CM

## 2024-05-09 PROCEDURE — 76811 OB US DETAILED SNGL FETUS: CPT | Mod: PBBFAC | Performed by: OBSTETRICS & GYNECOLOGY

## 2024-05-16 ENCOUNTER — PATIENT MESSAGE (OUTPATIENT)
Dept: OTHER | Facility: OTHER | Age: 39
End: 2024-05-16
Payer: MEDICAID

## 2024-05-20 ENCOUNTER — ROUTINE PRENATAL (OUTPATIENT)
Dept: OBSTETRICS AND GYNECOLOGY | Facility: CLINIC | Age: 39
End: 2024-05-20
Payer: MEDICAID

## 2024-05-20 VITALS
WEIGHT: 134.69 LBS | BODY MASS INDEX: 23.12 KG/M2 | SYSTOLIC BLOOD PRESSURE: 118 MMHG | DIASTOLIC BLOOD PRESSURE: 60 MMHG

## 2024-05-20 DIAGNOSIS — O09.90 SUPERVISION OF HIGH RISK PREGNANCY, ANTEPARTUM: Primary | ICD-10-CM

## 2024-05-20 PROCEDURE — 99999 PR PBB SHADOW E&M-EST. PATIENT-LVL II: CPT | Mod: PBBFAC,,, | Performed by: ADVANCED PRACTICE MIDWIFE

## 2024-05-20 PROCEDURE — 99213 OFFICE O/P EST LOW 20 MIN: CPT | Mod: TH,S$PBB,, | Performed by: ADVANCED PRACTICE MIDWIFE

## 2024-05-20 PROCEDURE — 99212 OFFICE O/P EST SF 10 MIN: CPT | Mod: PBBFAC,TH,PN | Performed by: ADVANCED PRACTICE MIDWIFE

## 2024-05-20 NOTE — PROGRESS NOTES
Reason for visit: Routine Prenatal Visit      HPI:   38 y.o., at 20w4d by Estimated Date of Delivery: 10/3/24    In with no c/o    - Contractions: denies  - Bleeding: denies  - Loss of fluid: denies  - Fetal movement: reports FM  - Nausea: no  - Vomiting: no  - Headache: no      Reviewed:    Past medical, surgical, social, family, and obstetric history: Reviewed and updated in EMR.  Medications: Reviewed and updated in EMR.  Allergies: Patient has no known allergies.    Pregnancy dating, labs, ultrasound reports, prenatal testing, and problem list: Reviewed and updated in EMR.  Outside records: na  Independent interpretation of tests: na  Discussion with another healthcare professional: na      Vitals: /60   Wt 61.1 kg (134 lb 11.2 oz)   LMP 2023   BMI 23.12 kg/m²     Physical exam:  GENERAL: No acute distress  ABD: Gravid      Assessment and Plan:    Supervision of high risk pregnancy, antepartum  -     OB Glucose Screen; Future; Expected date: 2024  -     CBC Auto Differential; Future; Expected date: 2024        Discussed DM screen next visit- instructions given     labor precautions given  Follow-up: 4 weeks      I spent a total of 20 minutes on the day of the visit. This includes face to face time and non-face to face time preparing to see the patient (eg, review of tests), Obtaining and/or reviewing separately obtained history, Documenting clinical information in the electronic or other health record, Independently interpreting results and communicating results to the patient/family/caregiver, or Care coordination.

## 2024-06-13 ENCOUNTER — PATIENT MESSAGE (OUTPATIENT)
Dept: OTHER | Facility: OTHER | Age: 39
End: 2024-06-13
Payer: MEDICAID

## 2024-06-17 ENCOUNTER — PROCEDURE VISIT (OUTPATIENT)
Dept: OBSTETRICS AND GYNECOLOGY | Facility: CLINIC | Age: 39
End: 2024-06-17
Payer: MEDICAID

## 2024-06-17 ENCOUNTER — ROUTINE PRENATAL (OUTPATIENT)
Dept: OBSTETRICS AND GYNECOLOGY | Facility: CLINIC | Age: 39
End: 2024-06-17
Payer: MEDICAID

## 2024-06-17 VITALS
SYSTOLIC BLOOD PRESSURE: 108 MMHG | BODY MASS INDEX: 23.46 KG/M2 | DIASTOLIC BLOOD PRESSURE: 70 MMHG | WEIGHT: 136.69 LBS

## 2024-06-17 DIAGNOSIS — Z34.80 SUPERVISION OF OTHER NORMAL PREGNANCY, ANTEPARTUM: Primary | ICD-10-CM

## 2024-06-17 DIAGNOSIS — O09.90 SUPERVISION OF HIGH RISK PREGNANCY, ANTEPARTUM: ICD-10-CM

## 2024-06-17 LAB
BASOPHILS # BLD AUTO: 0.06 K/UL (ref 0–0.2)
BASOPHILS NFR BLD: 0.6 % (ref 0–1.9)
DIFFERENTIAL METHOD BLD: ABNORMAL
EOSINOPHIL # BLD AUTO: 0 K/UL (ref 0–0.5)
EOSINOPHIL NFR BLD: 0.4 % (ref 0–8)
ERYTHROCYTE [DISTWIDTH] IN BLOOD BY AUTOMATED COUNT: 13.4 % (ref 11.5–14.5)
GLUCOSE SERPL-MCNC: 76 MG/DL (ref 70–140)
HCT VFR BLD AUTO: 32.2 % (ref 37–48.5)
HGB BLD-MCNC: 10.6 G/DL (ref 12–16)
IMM GRANULOCYTES # BLD AUTO: 0.15 K/UL (ref 0–0.04)
IMM GRANULOCYTES NFR BLD AUTO: 1.4 % (ref 0–0.5)
LYMPHOCYTES # BLD AUTO: 2.1 K/UL (ref 1–4.8)
LYMPHOCYTES NFR BLD: 20 % (ref 18–48)
MCH RBC QN AUTO: 30.9 PG (ref 27–31)
MCHC RBC AUTO-ENTMCNC: 32.9 G/DL (ref 32–36)
MCV RBC AUTO: 94 FL (ref 82–98)
MONOCYTES # BLD AUTO: 0.8 K/UL (ref 0.3–1)
MONOCYTES NFR BLD: 8 % (ref 4–15)
NEUTROPHILS # BLD AUTO: 7.3 K/UL (ref 1.8–7.7)
NEUTROPHILS NFR BLD: 69.6 % (ref 38–73)
NRBC BLD-RTO: 0 /100 WBC
PLATELET # BLD AUTO: 230 K/UL (ref 150–450)
PMV BLD AUTO: 10 FL (ref 9.2–12.9)
RBC # BLD AUTO: 3.43 M/UL (ref 4–5.4)
WBC # BLD AUTO: 10.42 K/UL (ref 3.9–12.7)

## 2024-06-17 PROCEDURE — 99213 OFFICE O/P EST LOW 20 MIN: CPT | Mod: TH,S$PBB,, | Performed by: ADVANCED PRACTICE MIDWIFE

## 2024-06-17 PROCEDURE — 82950 GLUCOSE TEST: CPT | Performed by: ADVANCED PRACTICE MIDWIFE

## 2024-06-17 PROCEDURE — 85025 COMPLETE CBC W/AUTO DIFF WBC: CPT | Performed by: ADVANCED PRACTICE MIDWIFE

## 2024-06-17 PROCEDURE — 99212 OFFICE O/P EST SF 10 MIN: CPT | Mod: PBBFAC,TH,PN | Performed by: ADVANCED PRACTICE MIDWIFE

## 2024-06-17 PROCEDURE — 99999 PR PBB SHADOW E&M-EST. PATIENT-LVL II: CPT | Mod: PBBFAC,,, | Performed by: ADVANCED PRACTICE MIDWIFE

## 2024-06-17 NOTE — PROGRESS NOTES
Reason for visit: Routine Prenatal Visit      HPI:   38 y.o., at 24w4d by Estimated Date of Delivery: 10/3/24    In with no c/o    - Contractions: denies  - Bleeding: denies  - Loss of fluid: denies  - Fetal movement: reports FM  - Nausea: no  - Vomiting: no  - Headache: no      Reviewed:    Past medical, surgical, social, family, and obstetric history: Reviewed and updated in EMR.  Medications: Reviewed and updated in EMR.  Allergies: Patient has no known allergies.    Pregnancy dating, labs, ultrasound reports, prenatal testing, and problem list: Reviewed and updated in EMR.  Outside records: na  Independent interpretation of tests: na  Discussion with another healthcare professional: na      Vitals: /70   Wt 62 kg (136 lb 11 oz)   LMP 2023   BMI 23.46 kg/m²     Physical exam:  GENERAL: No acute distress  ABD: Gravid      Assessment and Plan:    Supervision of other normal pregnancy, antepartum        DM screen today     labor precautions given  Follow-up: 4 weeks      I spent a total of 20 minutes on the day of the visit. This includes face to face time and non-face to face time preparing to see the patient (eg, review of tests), Obtaining and/or reviewing separately obtained history, Documenting clinical information in the electronic or other health record, Independently interpreting results and communicating results to the patient/family/caregiver, or Care coordination.

## 2024-06-18 ENCOUNTER — PATIENT MESSAGE (OUTPATIENT)
Dept: OBSTETRICS AND GYNECOLOGY | Facility: CLINIC | Age: 39
End: 2024-06-18
Payer: MEDICAID

## 2024-06-18 DIAGNOSIS — O99.012 ANEMIA DURING PREGNANCY IN SECOND TRIMESTER: Primary | ICD-10-CM

## 2024-06-18 PROBLEM — O99.019 ANEMIA AFFECTING PREGNANCY, ANTEPARTUM: Status: ACTIVE | Noted: 2024-06-18

## 2024-06-18 RX ORDER — FERROUS SULFATE 325(65) MG
325 TABLET, DELAYED RELEASE (ENTERIC COATED) ORAL DAILY
Qty: 30 TABLET | Refills: 5 | Status: SHIPPED | OUTPATIENT
Start: 2024-06-18

## 2024-06-27 ENCOUNTER — PATIENT MESSAGE (OUTPATIENT)
Dept: OTHER | Facility: OTHER | Age: 39
End: 2024-06-27
Payer: MEDICAID

## 2024-07-11 ENCOUNTER — PATIENT MESSAGE (OUTPATIENT)
Dept: OTHER | Facility: OTHER | Age: 39
End: 2024-07-11
Payer: MEDICAID

## 2024-07-15 ENCOUNTER — ROUTINE PRENATAL (OUTPATIENT)
Dept: OBSTETRICS AND GYNECOLOGY | Facility: CLINIC | Age: 39
End: 2024-07-15
Payer: MEDICAID

## 2024-07-15 ENCOUNTER — TELEPHONE (OUTPATIENT)
Dept: OBSTETRICS AND GYNECOLOGY | Facility: CLINIC | Age: 39
End: 2024-07-15

## 2024-07-15 VITALS
DIASTOLIC BLOOD PRESSURE: 58 MMHG | BODY MASS INDEX: 24.18 KG/M2 | WEIGHT: 140.88 LBS | SYSTOLIC BLOOD PRESSURE: 102 MMHG

## 2024-07-15 DIAGNOSIS — Z34.80 SUPERVISION OF OTHER NORMAL PREGNANCY, ANTEPARTUM: Primary | ICD-10-CM

## 2024-07-15 PROCEDURE — 99213 OFFICE O/P EST LOW 20 MIN: CPT | Mod: TH,S$PBB,UC, | Performed by: ADVANCED PRACTICE MIDWIFE

## 2024-07-15 PROCEDURE — 99999 PR PBB SHADOW E&M-EST. PATIENT-LVL II: CPT | Mod: PBBFAC,,, | Performed by: ADVANCED PRACTICE MIDWIFE

## 2024-07-15 PROCEDURE — 99212 OFFICE O/P EST SF 10 MIN: CPT | Mod: PBBFAC,TH,PN | Performed by: ADVANCED PRACTICE MIDWIFE

## 2024-07-15 NOTE — PROGRESS NOTES
Reason for visit: Routine Prenatal Visit      HPI:   38 y.o., at 28w4d by Estimated Date of Delivery: 10/3/24    In with no c/o    - Contractions: denies  - Bleeding: denies  - Loss of fluid: denies  - Fetal movement: reports good FM, reinforced BID  - Nausea: no  - Vomiting: no  - Headache: no      Reviewed:    Past medical, surgical, social, family, and obstetric history: Reviewed and updated in EMR.  Medications: Reviewed and updated in EMR.  Allergies: Patient has no known allergies.    Pregnancy dating, labs, ultrasound reports, prenatal testing, and problem list: Reviewed and updated in EMR.  Outside records: na  Independent interpretation of tests: na  Discussion with another healthcare professional: na      Vitals: BP (!) 102/58   Wt 63.9 kg (140 lb 14 oz)   LMP 2023   BMI 24.18 kg/m²     Physical exam:  GENERAL: No acute distress  ABD: Gravid      Assessment and Plan:    Supervision of other normal pregnancy, antepartum  -     US MFM Procedure (Viewpoint)-Future; Future; Expected date: 08/15/2024        Currently taking Fe supplement  Discussed all options for BCM- pt considering IUD  Pt planning to breastfeed  TDAP administered     labor precautions given  Follow-up: 2 weeks      I spent a total of 20 minutes on the day of the visit. This includes face to face time and non-face to face time preparing to see the patient (eg, review of tests), Obtaining and/or reviewing separately obtained history, Documenting clinical information in the electronic or other health record, Independently interpreting results and communicating results to the patient/family/caregiver, or Care coordination.

## 2024-07-25 ENCOUNTER — PATIENT MESSAGE (OUTPATIENT)
Dept: OTHER | Facility: OTHER | Age: 39
End: 2024-07-25
Payer: MEDICAID

## 2024-07-29 ENCOUNTER — ROUTINE PRENATAL (OUTPATIENT)
Dept: OBSTETRICS AND GYNECOLOGY | Facility: CLINIC | Age: 39
End: 2024-07-29
Payer: MEDICAID

## 2024-07-29 ENCOUNTER — HOSPITAL ENCOUNTER (EMERGENCY)
Facility: OTHER | Age: 39
Discharge: HOME | End: 2024-07-29
Attending: OBSTETRICS & GYNECOLOGY
Payer: MEDICAID

## 2024-07-29 VITALS
SYSTOLIC BLOOD PRESSURE: 103 MMHG | HEART RATE: 76 BPM | DIASTOLIC BLOOD PRESSURE: 57 MMHG | TEMPERATURE: 98 F | OXYGEN SATURATION: 100 % | RESPIRATION RATE: 18 BRPM

## 2024-07-29 VITALS — SYSTOLIC BLOOD PRESSURE: 96 MMHG | BODY MASS INDEX: 24.11 KG/M2 | WEIGHT: 140.44 LBS | DIASTOLIC BLOOD PRESSURE: 59 MMHG

## 2024-07-29 DIAGNOSIS — O09.93 SUPERVISION OF HIGH RISK PREGNANCY IN THIRD TRIMESTER: ICD-10-CM

## 2024-07-29 DIAGNOSIS — R07.9 CHEST PAIN, UNSPECIFIED TYPE: Primary | ICD-10-CM

## 2024-07-29 DIAGNOSIS — R07.9 CHEST PAIN: ICD-10-CM

## 2024-07-29 DIAGNOSIS — M79.661 RIGHT CALF PAIN: ICD-10-CM

## 2024-07-29 DIAGNOSIS — O09.529 ANTEPARTUM MULTIGRAVIDA OF ADVANCED MATERNAL AGE: Primary | ICD-10-CM

## 2024-07-29 DIAGNOSIS — Z3A.30 30 WEEKS GESTATION OF PREGNANCY: ICD-10-CM

## 2024-07-29 DIAGNOSIS — R06.02 SHORTNESS OF BREATH: ICD-10-CM

## 2024-07-29 LAB
ALBUMIN SERPL BCP-MCNC: 3.1 G/DL (ref 3.5–5.2)
ALP SERPL-CCNC: 80 U/L (ref 55–135)
ALT SERPL W/O P-5'-P-CCNC: 15 U/L (ref 10–44)
ANION GAP SERPL CALC-SCNC: 7 MMOL/L (ref 8–16)
AST SERPL-CCNC: 17 U/L (ref 10–40)
BASOPHILS # BLD AUTO: 0.06 K/UL (ref 0–0.2)
BASOPHILS NFR BLD: 0.5 % (ref 0–1.9)
BILIRUB SERPL-MCNC: 0.3 MG/DL (ref 0.1–1)
BILIRUB SERPL-MCNC: NORMAL MG/DL
BLOOD URINE, POC: NORMAL
BNP SERPL-MCNC: <10 PG/ML (ref 0–99)
BUN SERPL-MCNC: 6 MG/DL (ref 6–20)
CALCIUM SERPL-MCNC: 8.9 MG/DL (ref 8.7–10.5)
CHLORIDE SERPL-SCNC: 106 MMOL/L (ref 95–110)
CO2 SERPL-SCNC: 23 MMOL/L (ref 23–29)
COLOR, POC UA: NORMAL
CREAT SERPL-MCNC: 0.6 MG/DL (ref 0.5–1.4)
DIFFERENTIAL METHOD BLD: ABNORMAL
EOSINOPHIL # BLD AUTO: 0 K/UL (ref 0–0.5)
EOSINOPHIL NFR BLD: 0.3 % (ref 0–8)
ERYTHROCYTE [DISTWIDTH] IN BLOOD BY AUTOMATED COUNT: 13.1 % (ref 11.5–14.5)
EST. GFR  (NO RACE VARIABLE): >60 ML/MIN/1.73 M^2
GLUCOSE SERPL-MCNC: 77 MG/DL (ref 70–110)
GLUCOSE UR QL STRIP: NORMAL
HCT VFR BLD AUTO: 31.3 % (ref 37–48.5)
HGB BLD-MCNC: 10.6 G/DL (ref 12–16)
IMM GRANULOCYTES # BLD AUTO: 0.15 K/UL (ref 0–0.04)
IMM GRANULOCYTES NFR BLD AUTO: 1.3 % (ref 0–0.5)
KETONES UR QL STRIP: NORMAL
LEUKOCYTE ESTERASE URINE, POC: NORMAL
LYMPHOCYTES # BLD AUTO: 2 K/UL (ref 1–4.8)
LYMPHOCYTES NFR BLD: 17.8 % (ref 18–48)
MCH RBC QN AUTO: 30.6 PG (ref 27–31)
MCHC RBC AUTO-ENTMCNC: 33.9 G/DL (ref 32–36)
MCV RBC AUTO: 91 FL (ref 82–98)
MONOCYTES # BLD AUTO: 0.8 K/UL (ref 0.3–1)
MONOCYTES NFR BLD: 7.1 % (ref 4–15)
NEUTROPHILS # BLD AUTO: 8.1 K/UL (ref 1.8–7.7)
NEUTROPHILS NFR BLD: 73 % (ref 38–73)
NITRITE, POC UA: NORMAL
NRBC BLD-RTO: 0 /100 WBC
PH, POC UA: NORMAL
PLATELET # BLD AUTO: 250 K/UL (ref 150–450)
PMV BLD AUTO: 9.2 FL (ref 9.2–12.9)
POTASSIUM SERPL-SCNC: 3.9 MMOL/L (ref 3.5–5.1)
PROT SERPL-MCNC: 6.7 G/DL (ref 6–8.4)
PROTEIN, POC: NORMAL
RBC # BLD AUTO: 3.46 M/UL (ref 4–5.4)
SODIUM SERPL-SCNC: 136 MMOL/L (ref 136–145)
SPECIFIC GRAVITY, POC UA: NORMAL
TROPONIN I SERPL DL<=0.01 NG/ML-MCNC: <0.006 NG/ML (ref 0–0.03)
UROBILINOGEN, POC UA: NORMAL
WBC # BLD AUTO: 11.13 K/UL (ref 3.9–12.7)

## 2024-07-29 PROCEDURE — 59025 FETAL NON-STRESS TEST: CPT | Mod: 26,,, | Performed by: OBSTETRICS & GYNECOLOGY

## 2024-07-29 PROCEDURE — 99999 PR PBB SHADOW E&M-EST. PATIENT-LVL II: CPT | Mod: PBBFAC,,, | Performed by: ADVANCED PRACTICE MIDWIFE

## 2024-07-29 PROCEDURE — 81002 URINALYSIS NONAUTO W/O SCOPE: CPT

## 2024-07-29 PROCEDURE — 99212 OFFICE O/P EST SF 10 MIN: CPT | Mod: PBBFAC,TH | Performed by: ADVANCED PRACTICE MIDWIFE

## 2024-07-29 PROCEDURE — 99284 EMERGENCY DEPT VISIT MOD MDM: CPT | Mod: 25,27

## 2024-07-29 PROCEDURE — 99284 EMERGENCY DEPT VISIT MOD MDM: CPT | Mod: 25,,, | Performed by: OBSTETRICS & GYNECOLOGY

## 2024-07-29 PROCEDURE — 85025 COMPLETE CBC W/AUTO DIFF WBC: CPT

## 2024-07-29 PROCEDURE — 99213 OFFICE O/P EST LOW 20 MIN: CPT | Mod: 25,TH,S$PBB,UC | Performed by: ADVANCED PRACTICE MIDWIFE

## 2024-07-29 PROCEDURE — 83880 ASSAY OF NATRIURETIC PEPTIDE: CPT

## 2024-07-29 PROCEDURE — 84484 ASSAY OF TROPONIN QUANT: CPT

## 2024-07-29 PROCEDURE — 59025 FETAL NON-STRESS TEST: CPT

## 2024-07-29 PROCEDURE — 80053 COMPREHEN METABOLIC PANEL: CPT

## 2024-07-29 NOTE — PROGRESS NOTES
"  Reason for visit: Routine Prenatal Visit      HPI:   38 y.o., at 30w4d by Estimated Date of Delivery: 10/3/24    Pt in for routine OB visit- pt with c/o of chest pain- "feels like someone sitting on my chest", reports SOB and reports calf pain- R leg today. Pt reports episode of same in - went to ER but has not followed up since that time.     - Contractions: denies  - Bleeding: denies  - Loss of fluid: denies  - Fetal movement: reports good FM, reinforced BID  - Nausea: no  - Vomiting: no  - Headache: no      Reviewed:    Past medical, surgical, social, family, and obstetric history: Reviewed and updated in EMR.  Medications: Reviewed and updated in EMR.  Allergies: Patient has no known allergies.    Pregnancy dating, labs, ultrasound reports, prenatal testing, and problem list: Reviewed and updated in EMR.  Outside records: na  Independent interpretation of tests: na      Discussion with another healthcare professional: na      Vitals: BP (!) 96/59   Wt 63.7 kg (140 lb 6.9 oz)   LMP 2023   BMI 24.11 kg/m²     Physical exam:  GENERAL: No acute distress  ABD: Gravid, non tender  O2sat- 98% per pulse       Assessment and Plan:    Antepartum multigravida of advanced maternal age    Supervision of high risk pregnancy in third trimester  -     BREAST PUMP FOR HOME USE        Discussed with pt common discomforts of pregnancy including SOB sec to uterine pressure on diaphragm- advised of )2 sat of 98%  Discussed calf pain and normal muscle cramping in pregnancy vs s/s DVT  Advised of recommendation to go to the [facility] for evaluation       labor precautions given  Follow-up: 2 weeks      I spent a total of 20 minutes on the day of the visit. This includes face to face time and non-face to face time preparing to see the patient (eg, review of tests), Obtaining and/or reviewing separately obtained history, Documenting clinical information in the electronic or other health record, Independently " interpreting results and communicating results to the patient/family/caregiver, or Care coordination.

## 2024-08-08 ENCOUNTER — PATIENT MESSAGE (OUTPATIENT)
Dept: OTHER | Facility: OTHER | Age: 39
End: 2024-08-08
Payer: MEDICAID

## 2024-08-08 ENCOUNTER — PROCEDURE VISIT (OUTPATIENT)
Dept: MATERNAL FETAL MEDICINE | Facility: CLINIC | Age: 39
End: 2024-08-08
Payer: MEDICAID

## 2024-08-08 DIAGNOSIS — Z34.80 SUPERVISION OF OTHER NORMAL PREGNANCY, ANTEPARTUM: ICD-10-CM

## 2024-08-08 PROCEDURE — 76816 OB US FOLLOW-UP PER FETUS: CPT | Mod: PBBFAC | Performed by: OBSTETRICS & GYNECOLOGY

## 2024-08-12 ENCOUNTER — ROUTINE PRENATAL (OUTPATIENT)
Dept: OBSTETRICS AND GYNECOLOGY | Facility: CLINIC | Age: 39
End: 2024-08-12
Payer: MEDICAID

## 2024-08-12 ENCOUNTER — PROCEDURE VISIT (OUTPATIENT)
Dept: OBSTETRICS AND GYNECOLOGY | Facility: CLINIC | Age: 39
End: 2024-08-12
Payer: MEDICAID

## 2024-08-12 VITALS — SYSTOLIC BLOOD PRESSURE: 109 MMHG | WEIGHT: 143.5 LBS | BODY MASS INDEX: 24.64 KG/M2 | DIASTOLIC BLOOD PRESSURE: 63 MMHG

## 2024-08-12 DIAGNOSIS — Z34.80 SUPERVISION OF OTHER NORMAL PREGNANCY, ANTEPARTUM: ICD-10-CM

## 2024-08-12 DIAGNOSIS — Z34.80 SUPERVISION OF OTHER NORMAL PREGNANCY, ANTEPARTUM: Primary | ICD-10-CM

## 2024-08-12 LAB
BASOPHILS # BLD AUTO: 0.07 K/UL (ref 0–0.2)
BASOPHILS NFR BLD: 0.7 % (ref 0–1.9)
DIFFERENTIAL METHOD BLD: ABNORMAL
EOSINOPHIL # BLD AUTO: 0.1 K/UL (ref 0–0.5)
EOSINOPHIL NFR BLD: 0.5 % (ref 0–8)
ERYTHROCYTE [DISTWIDTH] IN BLOOD BY AUTOMATED COUNT: 13.2 % (ref 11.5–14.5)
HCT VFR BLD AUTO: 31.8 % (ref 37–48.5)
HGB BLD-MCNC: 10.5 G/DL (ref 12–16)
HIV 1+2 AB+HIV1 P24 AG SERPL QL IA: NEGATIVE
IMM GRANULOCYTES # BLD AUTO: 0.13 K/UL (ref 0–0.04)
IMM GRANULOCYTES NFR BLD AUTO: 1.3 % (ref 0–0.5)
LYMPHOCYTES # BLD AUTO: 1.7 K/UL (ref 1–4.8)
LYMPHOCYTES NFR BLD: 17.9 % (ref 18–48)
MCH RBC QN AUTO: 30.3 PG (ref 27–31)
MCHC RBC AUTO-ENTMCNC: 33 G/DL (ref 32–36)
MCV RBC AUTO: 92 FL (ref 82–98)
MONOCYTES # BLD AUTO: 0.7 K/UL (ref 0.3–1)
MONOCYTES NFR BLD: 7.1 % (ref 4–15)
NEUTROPHILS # BLD AUTO: 7 K/UL (ref 1.8–7.7)
NEUTROPHILS NFR BLD: 72.5 % (ref 38–73)
NRBC BLD-RTO: 0 /100 WBC
PLATELET # BLD AUTO: 237 K/UL (ref 150–450)
PMV BLD AUTO: 9.9 FL (ref 9.2–12.9)
RBC # BLD AUTO: 3.47 M/UL (ref 4–5.4)
TREPONEMA PALLIDUM IGG+IGM AB [PRESENCE] IN SERUM OR PLASMA BY IMMUNOASSAY: NONREACTIVE
WBC # BLD AUTO: 9.72 K/UL (ref 3.9–12.7)

## 2024-08-12 PROCEDURE — 99212 OFFICE O/P EST SF 10 MIN: CPT | Mod: PBBFAC,TH | Performed by: ADVANCED PRACTICE MIDWIFE

## 2024-08-12 PROCEDURE — 87389 HIV-1 AG W/HIV-1&-2 AB AG IA: CPT | Performed by: ADVANCED PRACTICE MIDWIFE

## 2024-08-12 PROCEDURE — 85025 COMPLETE CBC W/AUTO DIFF WBC: CPT | Performed by: ADVANCED PRACTICE MIDWIFE

## 2024-08-12 PROCEDURE — 86593 SYPHILIS TEST NON-TREP QUANT: CPT | Performed by: ADVANCED PRACTICE MIDWIFE

## 2024-08-12 PROCEDURE — 99999 PR PBB SHADOW E&M-EST. PATIENT-LVL II: CPT | Mod: PBBFAC,,, | Performed by: ADVANCED PRACTICE MIDWIFE

## 2024-08-12 NOTE — PROGRESS NOTES
Lab Documentation:    Order Type: Written Order placed in [facility]    Patient in for lab visit only per provider treatment plan.

## 2024-08-12 NOTE — PROGRESS NOTES
Reason for visit: Routine Prenatal Visit      HPI:   39 y.o., at 32w4d by Estimated Date of Delivery: 10/3/24    In with no c/o    - Contractions: denies  - Bleeding: denies  - Loss of fluid: denies  - Fetal movement: reports good FM, reinforced BID  - Nausea: no  - Vomiting: no  - Headache: no      Reviewed:    Past medical, surgical, social, family, and obstetric history: Reviewed and updated in EMR.  Medications: Reviewed and updated in EMR.  Allergies: Patient has no known allergies.    Pregnancy dating, labs, ultrasound reports, prenatal testing, and problem list: Reviewed and updated in EMR.  Outside records: n  Independent interpretation of tests: na  Discussion with another healthcare professional: na      Vitals: /63   Wt 65.1 kg (143 lb 8.3 oz)   LMP 2023   BMI 24.64 kg/m²     Physical exam:  GENERAL: No acute distress  ABD: Gravid      Assessment and Plan:    Supervision of other normal pregnancy, antepartum  -     HIV 1/2 Ag/Ab (4th Gen); Future; Expected date: 2024  -     Treponema Pallidium Antibodies IgG, IgM; Future; Expected date: 2024  -     CBC Auto Differential; Future; Expected date: 2024  -     BREAST PUMP FOR HOME USE        Remains undecided re BCM  Rx breast pump provided with instructions for obtaining     labor precautions given  Follow-up: 2 weeks      I spent a total of 20 minutes on the day of the visit. This includes face to face time and non-face to face time preparing to see the patient (eg, review of tests), Obtaining and/or reviewing separately obtained history, Documenting clinical information in the electronic or other health record, Independently interpreting results and communicating results to the patient/family/caregiver, or Care coordination.

## 2024-08-27 ENCOUNTER — ROUTINE PRENATAL (OUTPATIENT)
Dept: OBSTETRICS AND GYNECOLOGY | Facility: CLINIC | Age: 39
End: 2024-08-27
Payer: MEDICAID

## 2024-08-27 VITALS — SYSTOLIC BLOOD PRESSURE: 110 MMHG | WEIGHT: 145.5 LBS | DIASTOLIC BLOOD PRESSURE: 70 MMHG | BODY MASS INDEX: 24.98 KG/M2

## 2024-08-27 DIAGNOSIS — Z34.80 SUPERVISION OF OTHER NORMAL PREGNANCY, ANTEPARTUM: Primary | ICD-10-CM

## 2024-08-27 PROCEDURE — 99212 OFFICE O/P EST SF 10 MIN: CPT | Mod: PBBFAC,TH | Performed by: OBSTETRICS & GYNECOLOGY

## 2024-08-27 PROCEDURE — 99999 PR PBB SHADOW E&M-EST. PATIENT-LVL II: CPT | Mod: PBBFAC,,, | Performed by: OBSTETRICS & GYNECOLOGY

## 2024-08-27 PROCEDURE — 99213 OFFICE O/P EST LOW 20 MIN: CPT | Mod: TH,S$PBB,, | Performed by: OBSTETRICS & GYNECOLOGY

## 2024-08-27 NOTE — PROGRESS NOTES
Pregnancy dating, labs, ultrasound reports, prenatal testing, and problem list; prior records and results; and available outside records were reviewed and updated in EMR.  Pertinent findings were noted below.    Reason for Visit   Routine Prenatal Visit    HPI   39 y.o., at 34w5d by Estimated Date of Delivery: 10/3/24    Seeing cardiologist for SOB, palpitations and was found to have heart murmur. Getting heart US scheduled    Contractions: No   Bleeding: No   Loss of fluid: No   Fetal movement: Yes   Nausea: No   Vomiting: No   Headache: No     Exam   /70   Wt 66 kg (145 lb 8.1 oz)   LMP 2023   BMI 24.98 kg/m²     TWG: 15#  GENERAL: No acute distress  ABD: Gravid    Assessment and Plan   Supervision of other normal pregnancy, antepartum        Counseled patient that during pregnancy with the increased blood volume that palpitations and flow murmurs can be normal findings in pregnancy but agreed with continued workup with cardiology.   GBS/consents at next visit     labor, Pain and bleeding, and fetal movement count precautions given  Follow-up: 2 weeks

## 2024-08-29 ENCOUNTER — PATIENT MESSAGE (OUTPATIENT)
Dept: OTHER | Facility: OTHER | Age: 39
End: 2024-08-29
Payer: MEDICAID

## 2024-09-09 ENCOUNTER — ROUTINE PRENATAL (OUTPATIENT)
Dept: OBSTETRICS AND GYNECOLOGY | Facility: CLINIC | Age: 39
End: 2024-09-09
Payer: MEDICAID

## 2024-09-09 VITALS
WEIGHT: 150.13 LBS | BODY MASS INDEX: 25.77 KG/M2 | SYSTOLIC BLOOD PRESSURE: 113 MMHG | DIASTOLIC BLOOD PRESSURE: 62 MMHG

## 2024-09-09 DIAGNOSIS — Z30.018 ENCOUNTER FOR INITIAL PRESCRIPTION OF OTHER CONTRACEPTIVES: ICD-10-CM

## 2024-09-09 DIAGNOSIS — Z34.80 SUPERVISION OF OTHER NORMAL PREGNANCY, ANTEPARTUM: Primary | ICD-10-CM

## 2024-09-09 PROBLEM — Z30.019 ENCOUNTER FOR INITIAL PRESCRIPTION OF CONTRACEPTIVES: Status: ACTIVE | Noted: 2024-09-09

## 2024-09-09 PROCEDURE — 87653 STREP B DNA AMP PROBE: CPT | Performed by: ADVANCED PRACTICE MIDWIFE

## 2024-09-09 PROCEDURE — 99213 OFFICE O/P EST LOW 20 MIN: CPT | Mod: TH,S$PBB,, | Performed by: ADVANCED PRACTICE MIDWIFE

## 2024-09-09 PROCEDURE — 99999 PR PBB SHADOW E&M-EST. PATIENT-LVL II: CPT | Mod: PBBFAC,,, | Performed by: ADVANCED PRACTICE MIDWIFE

## 2024-09-09 PROCEDURE — 99212 OFFICE O/P EST SF 10 MIN: CPT | Mod: PBBFAC,TH | Performed by: ADVANCED PRACTICE MIDWIFE

## 2024-09-09 NOTE — PROGRESS NOTES
Reason for visit: Routine Prenatal Visit      HPI:   39 y.o., at 36w4d by Estimated Date of Delivery: 10/3/24    In with no c/o    - Contractions: no- just discomfort  - Bleeding: denies  - Loss of fluid: denies  - Fetal movement: reports good Fm, reinforced BID  - Nausea: no  - Vomiting: no  - Headache: no      Reviewed:    Past medical, surgical, social, family, and obstetric history: Reviewed and updated in EMR.  Medications: Reviewed and updated in EMR.  Allergies: Patient has no known allergies.    Pregnancy dating, labs, ultrasound reports, prenatal testing, and problem list: Reviewed and updated in EMR.  Outside records: na  Independent interpretation of tests: na  Discussion with another healthcare professional: na      Vitals: /62   Wt 68.1 kg (150 lb 2.1 oz)   LMP 2023   BMI 25.77 kg/m²     Physical exam:  GENERAL: No acute distress  ABD: Gravid      Assessment and Plan:    Supervision of other normal pregnancy, antepartum    Encounter for initial prescription of other contraceptives        GBS obtained  Consents signed  Pt desires IUD at delivery     labor precautions given  Follow-up: 1 weeks      I spent a total of 20 minutes on the day of the visit. This includes face to face time and non-face to face time preparing to see the patient (eg, review of tests), Obtaining and/or reviewing separately obtained history, Documenting clinical information in the electronic or other health record, Independently interpreting results and communicating results to the patient/family/caregiver, or Care coordination.

## 2024-09-11 LAB — GROUP B STREPTOCOCCUS, PCR: NEGATIVE

## 2024-09-17 ENCOUNTER — ROUTINE PRENATAL (OUTPATIENT)
Dept: OBSTETRICS AND GYNECOLOGY | Facility: CLINIC | Age: 39
End: 2024-09-17
Attending: OBSTETRICS & GYNECOLOGY
Payer: MEDICAID

## 2024-09-17 VITALS — WEIGHT: 154.31 LBS | BODY MASS INDEX: 26.49 KG/M2 | DIASTOLIC BLOOD PRESSURE: 54 MMHG | SYSTOLIC BLOOD PRESSURE: 98 MMHG

## 2024-09-17 DIAGNOSIS — O09.529 ANTEPARTUM MULTIGRAVIDA OF ADVANCED MATERNAL AGE: Primary | ICD-10-CM

## 2024-09-17 DIAGNOSIS — O99.019 ANEMIA AFFECTING PREGNANCY, ANTEPARTUM: ICD-10-CM

## 2024-09-17 PROCEDURE — 99999 PR PBB SHADOW E&M-EST. PATIENT-LVL III: CPT | Mod: PBBFAC,,, | Performed by: OBSTETRICS & GYNECOLOGY

## 2024-09-17 PROCEDURE — 99213 OFFICE O/P EST LOW 20 MIN: CPT | Mod: TH,S$PBB,, | Performed by: OBSTETRICS & GYNECOLOGY

## 2024-09-17 PROCEDURE — 99213 OFFICE O/P EST LOW 20 MIN: CPT | Mod: PBBFAC,TH | Performed by: OBSTETRICS & GYNECOLOGY

## 2024-09-17 NOTE — PROGRESS NOTES
Complaints today:none, Good fetal movements reported. No Bleeding or pains    BP (!) 98/54   Wt 70 kg (154 lb 5.2 oz)   LMP 2023   BMI 26.49 kg/m²     39 y.o., at 37w5d by Estimated Date of Delivery: 10/3/24  Patient Active Problem List   Diagnosis    History of depression    Antepartum multigravida of advanced maternal age    Anemia affecting pregnancy, antepartum    Encounter for initial prescription of contraceptives- desires IUD at [facility]     OB History    Para Term  AB Living   3 2 2     2   SAB IAB Ectopic Multiple Live Births         0 2      # Outcome Date GA Lbr Kaden/2nd Weight Sex Type Anes PTL Lv   3 Current            2 Term 19 39w4d  3.374 kg (7 lb 7 oz) M Vag-Spont EPI  YUMIKO   1 Term 10/22/13 38w0d  2.892 kg (6 lb 6 oz) F Vag-Spont EPI N YUMIKO      Birth Comments: water broke evening        Dating reviewed    Allergies and problem list reviewed and updated    Medical and surgical history reviewed    Prenatal labs reviewed and updated    Physical Exam:  ABD: soft, gravid, nontender,     Assessment:  Katey was seen today for routine prenatal visit.    Diagnoses and all orders for this visit:    Antepartum multigravida of advanced maternal age    Anemia affecting pregnancy, antepartum         Plan:   Counseled, desires flu on rtc.  Declines induction   follow up 1 Weeks, bleeding/pain precautions  kick counts, labor precautions

## 2024-09-23 ENCOUNTER — ROUTINE PRENATAL (OUTPATIENT)
Dept: OBSTETRICS AND GYNECOLOGY | Facility: CLINIC | Age: 39
End: 2024-09-23
Payer: MEDICAID

## 2024-09-23 VITALS — SYSTOLIC BLOOD PRESSURE: 96 MMHG | BODY MASS INDEX: 26.22 KG/M2 | WEIGHT: 152.75 LBS | DIASTOLIC BLOOD PRESSURE: 58 MMHG

## 2024-09-23 DIAGNOSIS — Z34.80 SUPERVISION OF OTHER NORMAL PREGNANCY, ANTEPARTUM: Primary | ICD-10-CM

## 2024-09-23 PROCEDURE — 99999 PR PBB SHADOW E&M-EST. PATIENT-LVL II: CPT | Mod: PBBFAC,,, | Performed by: ADVANCED PRACTICE MIDWIFE

## 2024-09-23 PROCEDURE — 99212 OFFICE O/P EST SF 10 MIN: CPT | Mod: PBBFAC,TH | Performed by: ADVANCED PRACTICE MIDWIFE

## 2024-09-23 NOTE — PROGRESS NOTES
Reason for visit: Routine Prenatal Visit      HPI:   39 y.o., at 38w4d by Estimated Date of Delivery: 10/3/24    In with no c/o    - Contractions: denies  - Bleeding: denies  - Loss of fluid: denies  - Fetal movement: reports good FM, reinforced BID  - Nausea: no  - Vomiting: no  - Headache: no      Reviewed:    Past medical, surgical, social, family, and obstetric history: Reviewed and updated in EMR.  Medications: Reviewed and updated in EMR.  Allergies: Patient has no known allergies.    Pregnancy dating, labs, ultrasound reports, prenatal testing, and problem list: Reviewed and updated in EMR.  Outside records: na  Independent interpretation of tests: na  Discussion with another healthcare professional: na      Vitals: BP (!) 96/58   Wt 69.3 kg (152 lb 12.5 oz)   LMP 12/30/2023   BMI 26.22 kg/m²     Physical exam:  GENERAL: No acute distress  ABD: Gravid      Assessment and Plan:    Supervision of other normal pregnancy, antepartum        Does not want to go to injection clinic- advised will have flu vaccine here in office next week.  Reviewed s/s active labor, rom, bleeding and if occur report to L&D      Follow-up: 1 weeks      I spent a total of 20 minutes on the day of the visit. This includes face to face time and non-face to face time preparing to see the patient (eg, review of tests), Obtaining and/or reviewing separately obtained history, Documenting clinical information in the electronic or other health record, Independently interpreting results and communicating results to the patient/family/caregiver, or Care coordination.

## 2024-09-24 NOTE — PROGRESS NOTES
Reason for visit: Routine Prenatal Visit      HPI:   39 y.o., at 38w5d by Estimated Date of Delivery: 10/3/24    In with no c/o    - Contractions: denies  - Bleeding: denies  - Loss of fluid: denies  - Fetal movement: reports good FM reinforced BID  - Nausea: no  - Vomiting: no  - Headache: no      Reviewed:    Past medical, surgical, social, family, and obstetric history: Reviewed and updated in EMR.  Medications: Reviewed and updated in EMR.  Allergies: Patient has no known allergies.    Pregnancy dating, labs, ultrasound reports, prenatal testing, and problem list: Reviewed and updated in EMR.  Outside records: na  Independent interpretation of tests: na  Discussion with another healthcare professional: na      Vitals: BP (!) 96/58   Wt 69.3 kg (152 lb 12.5 oz)   LMP 12/30/2023   BMI 26.22 kg/m²     Physical exam:  GENERAL: No acute distress  ABD: Gravid      Assessment and Plan:    Supervision of other normal pregnancy, antepartum        Reviewed s/s active labor,rom, bleeding and if occur report to L&D  Declined IOL at this time    Follow-up: 1 weeks      I spent a total of 20 minutes on the day of the visit. This includes face to face time and non-face to face time preparing to see the patient (eg, review of tests), Obtaining and/or reviewing separately obtained history, Documenting clinical information in the electronic or other health record, Independently interpreting results and communicating results to the patient/family/caregiver, or Care coordination.

## 2024-09-24 NOTE — H&P (VIEW-ONLY)
HISTORY AND PHYSICAL                                                OBSTETRICS          Subjective:       Katey Cedillo is a 39 y.o.  female with IUP with EDC of  10/03/24    This IUP is complicated by   AMA  Anemia in pregnancy.H&H at 36wk- 31.8/10.5  Hx depression- no meds during pregnancy    Review of Systems   Constitutional: Negative.    HENT: Negative.     Respiratory: Negative.     Cardiovascular: Negative.    Gastrointestinal: Negative.    Endocrine: Negative.    Genitourinary: Negative.    Musculoskeletal: Negative.    Neurological: Negative.    Hematological: Negative.    Psychiatric/Behavioral:  Positive for depression.        PMHx:   Past Medical History:   Diagnosis Date    Depression     --alternative treatments ( hospitalization x 1)     Pilonidal cyst with abscess 2019    I&D  during pregnancy       PSHx: History reviewed. No pertinent surgical history.    All: Review of patient's allergies indicates:  No Known Allergies    Meds: (Not in a hospital admission)      SH:   Social History     Socioeconomic History    Marital status: Significant Other     Spouse name: Alvarado-partner   Occupational History    Occupation: [employer]   Tobacco Use    Smoking status: Never    Smokeless tobacco: Never   Substance and Sexual Activity    Alcohol use: Not Currently    Drug use: Never    Sexual activity: Yes     Partners: Male     Comment: Nemo     Social Determinants of Health     Financial Resource Strain: Low Risk  (2024)    Overall Financial Resource Strain (CARDIA)     Difficulty of Paying Living Expenses: Not very hard   Food Insecurity: No Food Insecurity (2024)    Hunger Vital Sign     Worried About Running Out of Food in the Last Year: Never true     Ran Out of Food in the Last Year: Never true   Transportation Needs: No Transportation Needs (2024)    TRANSPORTATION NEEDS     Transportation : No   Physical Activity: Insufficiently Active (2024)    Exercise Vital Sign      Days of Exercise per Week: 2 days     Minutes of Exercise per Session: 30 min   Stress: No Stress Concern Present (2024)    [employer] [place] [employer] - Occupational Stress Questionnaire     Feeling of Stress : Not at all   Housing Stability: Low Risk  (2024)    Housing Stability Vital Sign     Unable to Pay for Housing in the Last Year: No     Homeless in the Last Year: No       FH:   Family History   Problem Relation Name Age of Onset    Breast cancer Paternal Grandmother          > 51 y/o at diagnosis    Breast cancer Cousin Radha         40's at diagnosis    Hypertension Neg Hx      Cancer Neg Hx      Ovarian cancer Neg Hx      Colon cancer Neg Hx         OBHx:   OB History    Para Term  AB Living   3 2 2 0 0 2   SAB IAB Ectopic Multiple Live Births   0 0 0 0 2      # Outcome Date GA Lbr Kaden/2nd Weight Sex Type Anes PTL Lv   3 Current            2 Term 19 39w4d  3.374 kg (7 lb 7 oz) M Vag-Spont EPI  YUMIKO      Name: DEJUAN TEE      Apgar1: 8  Apgar5: 9   1 Term 10/22/13 38w0d  2.892 kg (6 lb 6 oz) F Vag-Spont EPI N YUMIKO      Birth Comments: water broke evening       Name: René       Objective:       BP (!) 96/58   Wt 69.3 kg (152 lb 12.5 oz)   LMP 2023   BMI 26.22 kg/m²     Vitals:    24 0929   BP: (!) 96/58   Weight: 69.3 kg (152 lb 12.5 oz)       General:   alert, appears stated age and cooperative, no apparent distress   HENT:  normocephalic, atraumatic   Eyes:  extraocular movements and conjunctivae normal   Neck:  supple, range of motion normal, no thyromegaly   Lungs:   no respiratory distress   Heart:   regular rate   Abdomen:  soft, non-tender, non-distended but gravid, no rebound or guarding   Extremities negative edema, negative erythema   FHT: To be performed upon admission                 TOCO: To be performed upon admission   Presentations: To be performed upon admission   Cervix: To be performed upon admission   Sterile  Speculum Exam: PRN on admit    EFW by Leopold's: 7#    Recent Growth Scan: 32wk- EFW 21%, AC 16%    Lab Review  Blood Type B POS  GBBS: negative  Rubella: Immune  Treponemal Abs: non reactive  HIV: negative  HepB: negative  HepC: negative       Assessment:     IUP with EDC  10/03/24      Plan:      Risks, benefits, alternatives and possible complications have been discussed in detail with the patient.   - Consents signed and to chart  - Admit to Labor and Delivery unit  - Neuraxial/regional per Anesthesia  - Draw CBC, T&S  - IOL plan per L&D team  - EFW 7#  - Maternal pelvis- proven to 7#      Post-Partum Hemorrhage risk - low

## 2024-09-24 NOTE — H&P
HISTORY AND PHYSICAL                                                OBSTETRICS          Subjective:       Katey Cedillo is a 39 y.o.  female with IUP with EDC of  10/03/24    This IUP is complicated by   AMA  Anemia in pregnancy.H&H at 36wk- 31.8/10.5  Hx depression- no meds during pregnancy    Review of Systems   Constitutional: Negative.    HENT: Negative.     Respiratory: Negative.     Cardiovascular: Negative.    Gastrointestinal: Negative.    Endocrine: Negative.    Genitourinary: Negative.    Musculoskeletal: Negative.    Neurological: Negative.    Hematological: Negative.    Psychiatric/Behavioral:  Positive for depression.        PMHx:   Past Medical History:   Diagnosis Date    Depression     --alternative treatments ( hospitalization x 1)     Pilonidal cyst with abscess 2019    I&D  during pregnancy       PSHx: History reviewed. No pertinent surgical history.    All: Review of patient's allergies indicates:  No Known Allergies    Meds: (Not in a hospital admission)      SH:   Social History     Socioeconomic History    Marital status: Significant Other     Spouse name: Alvarado-partner   Occupational History    Occupation: [employer]   Tobacco Use    Smoking status: Never    Smokeless tobacco: Never   Substance and Sexual Activity    Alcohol use: Not Currently    Drug use: Never    Sexual activity: Yes     Partners: Male     Comment: Nemo     Social Determinants of Health     Financial Resource Strain: Low Risk  (2024)    Overall Financial Resource Strain (CARDIA)     Difficulty of Paying Living Expenses: Not very hard   Food Insecurity: No Food Insecurity (2024)    Hunger Vital Sign     Worried About Running Out of Food in the Last Year: Never true     Ran Out of Food in the Last Year: Never true   Transportation Needs: No Transportation Needs (2024)    TRANSPORTATION NEEDS     Transportation : No   Physical Activity: Insufficiently Active (2024)    Exercise Vital Sign      Days of Exercise per Week: 2 days     Minutes of Exercise per Session: 30 min   Stress: No Stress Concern Present (2024)    [place] [employer] - Occupational Stress Questionnaire     Feeling of Stress : Not at all   Housing Stability: Low Risk  (2024)    Housing Stability Vital Sign     Unable to Pay for Housing in the Last Year: No     Homeless in the Last Year: No       FH:   Family History   Problem Relation Name Age of Onset    Breast cancer Paternal Grandmother          > 49 y/o at diagnosis    Breast cancer Cousin Radha         40's at diagnosis    Hypertension Neg Hx      Cancer Neg Hx      Ovarian cancer Neg Hx      Colon cancer Neg Hx         OBHx:   OB History    Para Term  AB Living   3 2 2 0 0 2   SAB IAB Ectopic Multiple Live Births   0 0 0 0 2      # Outcome Date GA Lbr Kaden/2nd Weight Sex Type Anes PTL Lv   3 Current            2 Term 19 39w4d  3.374 kg (7 lb 7 oz) M Vag-Spont EPI  YUMIKO      Name: DEJUAN TEE      Apgar1: 8  Apgar5: 9   1 Term 10/22/13 38w0d  2.892 kg (6 lb 6 oz) F Vag-Spont EPI N YUMIKO      Birth Comments: water broke evening       Name: René       Objective:       BP (!) 96/58   Wt 69.3 kg (152 lb 12.5 oz)   LMP 2023   BMI 26.22 kg/m²     Vitals:    24 0929   BP: (!) 96/58   Weight: 69.3 kg (152 lb 12.5 oz)       General:   alert, appears stated age and cooperative, no apparent distress   HENT:  normocephalic, atraumatic   Eyes:  extraocular movements and conjunctivae normal   Neck:  supple, range of motion normal, no thyromegaly   Lungs:   no respiratory distress   Heart:   regular rate   Abdomen:  soft, non-tender, non-distended but gravid, no rebound or guarding   Extremities negative edema, negative erythema   FHT: To be performed upon admission                 TOCO: To be performed upon admission   Presentations: To be performed upon admission   Cervix: To be performed upon admission   Sterile  Speculum Exam: PRN on admit    EFW by Leopold's: 7#    Recent Growth Scan: 32wk- EFW 21%, AC 16%    Lab Review  Blood Type B POS  GBBS: negative  Rubella: Immune  Treponemal Abs: non reactive  HIV: negative  HepB: negative  HepC: negative       Assessment:     IUP with EDC  10/03/24      Plan:      Risks, benefits, alternatives and possible complications have been discussed in detail with the patient.   - Consents signed and to chart  - Admit to Labor and Delivery unit  - Neuraxial/regional per Anesthesia  - Draw CBC, T&S  - IOL plan per L&D team  - EFW 7#  - Maternal pelvis- proven to 7#      Post-Partum Hemorrhage risk - low

## 2024-09-30 ENCOUNTER — ROUTINE PRENATAL (OUTPATIENT)
Dept: OBSTETRICS AND GYNECOLOGY | Facility: CLINIC | Age: 39
End: 2024-09-30
Payer: MEDICAID

## 2024-09-30 VITALS
WEIGHT: 152.13 LBS | DIASTOLIC BLOOD PRESSURE: 62 MMHG | SYSTOLIC BLOOD PRESSURE: 100 MMHG | BODY MASS INDEX: 26.11 KG/M2

## 2024-09-30 DIAGNOSIS — O26.00 EXCESSIVE WEIGHT GAIN DURING PREGNANCY, ANTEPARTUM: ICD-10-CM

## 2024-09-30 DIAGNOSIS — O09.529 ANTEPARTUM MULTIGRAVIDA OF ADVANCED MATERNAL AGE: Primary | ICD-10-CM

## 2024-09-30 DIAGNOSIS — O99.019 ANEMIA AFFECTING PREGNANCY, ANTEPARTUM: ICD-10-CM

## 2024-09-30 PROCEDURE — 99999 PR PBB SHADOW E&M-EST. PATIENT-LVL II: CPT | Mod: PBBFAC,,, | Performed by: OBSTETRICS & GYNECOLOGY

## 2024-09-30 PROCEDURE — 99212 OFFICE O/P EST SF 10 MIN: CPT | Mod: PBBFAC,TH | Performed by: OBSTETRICS & GYNECOLOGY

## 2024-09-30 PROCEDURE — 99213 OFFICE O/P EST LOW 20 MIN: CPT | Mod: TH,S$PBB,, | Performed by: OBSTETRICS & GYNECOLOGY

## 2024-09-30 NOTE — PROGRESS NOTES
Pregnancy dating, labs, ultrasound reports, prenatal testing, and problem list; prior records and results; and available outside records were reviewed and updated in EMR.  Pertinent findings were noted below.    Reason for Visit   Routine Prenatal Visit    HPI   39 y.o., at 39w4d by Estimated Date of Delivery: 10/3/24    Okay with IOL if hasn't gone into labor by 41.0    Contractions: Rarely   Bleeding: No   Loss of fluid: No   Fetal movement: Yes   Nausea: No   Vomiting: No   Headache: No     Exam   /62   Wt 69 kg (152 lb 1.9 oz)   LMP 2023   BMI 26.11 kg/m²     TW#  GENERAL: No acute distress  ABD: Gravid  SVE: ft/thick/high, posterior    Assessment and Plan   Antepartum multigravida of advanced maternal age  -     IP OB Labor Induction; Future; Expected date: 10/11/2024    Anemia affecting pregnancy, antepartum    Excessive weight gain during pregnancy, antepartum        MOD: anticipate , pelvis proven 7#7  MOF: breast, has pump  MOC: post placental IUD consents signed after discussing R/B/A  Consents: already done and viewed in media  H&P: already placed  If no labor by 41.0 - IOL requested after counseling     Labor/RICARDO precautions given  Follow-up: 1 week

## 2024-10-06 ENCOUNTER — ANESTHESIA EVENT (OUTPATIENT)
Dept: OBSTETRICS AND GYNECOLOGY | Facility: OTHER | Age: 39
End: 2024-10-06
Payer: MEDICAID

## 2024-10-06 ENCOUNTER — HOSPITAL ENCOUNTER (INPATIENT)
Facility: OTHER | Age: 39
LOS: 2 days | Discharge: HOME | End: 2024-10-08
Attending: STUDENT IN AN ORGANIZED HEALTH CARE EDUCATION/TRAINING PROGRAM | Admitting: OBSTETRICS & GYNECOLOGY
Payer: MEDICAID

## 2024-10-06 ENCOUNTER — ANESTHESIA (OUTPATIENT)
Dept: OBSTETRICS AND GYNECOLOGY | Facility: OTHER | Age: 39
End: 2024-10-06
Payer: MEDICAID

## 2024-10-06 DIAGNOSIS — Z30.018 ENCOUNTER FOR INITIAL PRESCRIPTION OF OTHER CONTRACEPTIVES: ICD-10-CM

## 2024-10-06 DIAGNOSIS — Z3A.40 40 WEEKS GESTATION OF PREGNANCY: ICD-10-CM

## 2024-10-06 DIAGNOSIS — Z37.9 NORMAL LABOR: ICD-10-CM

## 2024-10-06 LAB
ABO + RH BLD: NORMAL
BASOPHILS # BLD AUTO: 0.05 K/UL (ref 0–0.2)
BASOPHILS NFR BLD: 0.5 % (ref 0–1.9)
BLD GP AB SCN CELLS X3 SERPL QL: NORMAL
DIFFERENTIAL METHOD BLD: ABNORMAL
EOSINOPHIL # BLD AUTO: 0 K/UL (ref 0–0.5)
EOSINOPHIL NFR BLD: 0.4 % (ref 0–8)
ERYTHROCYTE [DISTWIDTH] IN BLOOD BY AUTOMATED COUNT: 13.6 % (ref 11.5–14.5)
HCT VFR BLD AUTO: 32 % (ref 37–48.5)
HGB BLD-MCNC: 10.7 G/DL (ref 12–16)
IMM GRANULOCYTES # BLD AUTO: 0.09 K/UL (ref 0–0.04)
IMM GRANULOCYTES NFR BLD AUTO: 1 % (ref 0–0.5)
LYMPHOCYTES # BLD AUTO: 2.1 K/UL (ref 1–4.8)
LYMPHOCYTES NFR BLD: 22.1 % (ref 18–48)
MCH RBC QN AUTO: 29.2 PG (ref 27–31)
MCHC RBC AUTO-ENTMCNC: 33.4 G/DL (ref 32–36)
MCV RBC AUTO: 87 FL (ref 82–98)
MONOCYTES # BLD AUTO: 0.6 K/UL (ref 0.3–1)
MONOCYTES NFR BLD: 6.6 % (ref 4–15)
NEUTROPHILS # BLD AUTO: 6.5 K/UL (ref 1.8–7.7)
NEUTROPHILS NFR BLD: 69.4 % (ref 38–73)
NRBC BLD-RTO: 0 /100 WBC
PLATELET # BLD AUTO: 247 K/UL (ref 150–450)
PMV BLD AUTO: 9.7 FL (ref 9.2–12.9)
RBC # BLD AUTO: 3.66 M/UL (ref 4–5.4)
TREPONEMA PALLIDUM IGG+IGM AB [PRESENCE] IN SERUM OR PLASMA BY IMMUNOASSAY: NONREACTIVE
WBC # BLD AUTO: 9.42 K/UL (ref 3.9–12.7)

## 2024-10-06 PROCEDURE — 72100003 HC LABOR CARE, EA. ADDL. 8 HRS

## 2024-10-06 PROCEDURE — C1751 CATH, INF, PER/CENT/MIDLINE: HCPCS | Performed by: ANESTHESIOLOGY

## 2024-10-06 PROCEDURE — 0UH97HZ INSERTION OF CONTRACEPTIVE DEVICE INTO UTERUS, VIA NATURAL OR ARTIFICIAL OPENING: ICD-10-PCS | Performed by: STUDENT IN AN ORGANIZED HEALTH CARE EDUCATION/TRAINING PROGRAM

## 2024-10-06 PROCEDURE — 63600175 PHARM REV CODE 636 W HCPCS

## 2024-10-06 PROCEDURE — 86850 RBC ANTIBODY SCREEN: CPT | Performed by: STUDENT IN AN ORGANIZED HEALTH CARE EDUCATION/TRAINING PROGRAM

## 2024-10-06 PROCEDURE — 72100002 HC LABOR CARE, 1ST 8 HOURS

## 2024-10-06 PROCEDURE — 25000003 PHARM REV CODE 250: Performed by: STUDENT IN AN ORGANIZED HEALTH CARE EDUCATION/TRAINING PROGRAM

## 2024-10-06 PROCEDURE — 86900 BLOOD TYPING SEROLOGIC ABO: CPT | Performed by: STUDENT IN AN ORGANIZED HEALTH CARE EDUCATION/TRAINING PROGRAM

## 2024-10-06 PROCEDURE — 99499 UNLISTED E&M SERVICE: CPT | Mod: ,,, | Performed by: STUDENT IN AN ORGANIZED HEALTH CARE EDUCATION/TRAINING PROGRAM

## 2024-10-06 PROCEDURE — 27200710 HC EPIDURAL INFUSION PUMP SET: Performed by: ANESTHESIOLOGY

## 2024-10-06 PROCEDURE — 10907ZC DRAINAGE OF AMNIOTIC FLUID, THERAPEUTIC FROM PRODUCTS OF CONCEPTION, VIA NATURAL OR ARTIFICIAL OPENING: ICD-10-PCS | Performed by: STUDENT IN AN ORGANIZED HEALTH CARE EDUCATION/TRAINING PROGRAM

## 2024-10-06 PROCEDURE — 63600175 PHARM REV CODE 636 W HCPCS: Performed by: STUDENT IN AN ORGANIZED HEALTH CARE EDUCATION/TRAINING PROGRAM

## 2024-10-06 PROCEDURE — 51702 INSERT TEMP BLADDER CATH: CPT

## 2024-10-06 PROCEDURE — 58300 INSERT INTRAUTERINE DEVICE: CPT | Mod: 51,,, | Performed by: STUDENT IN AN ORGANIZED HEALTH CARE EDUCATION/TRAINING PROGRAM

## 2024-10-06 PROCEDURE — 86901 BLOOD TYPING SEROLOGIC RH(D): CPT | Performed by: STUDENT IN AN ORGANIZED HEALTH CARE EDUCATION/TRAINING PROGRAM

## 2024-10-06 PROCEDURE — 85025 COMPLETE CBC W/AUTO DIFF WBC: CPT | Performed by: STUDENT IN AN ORGANIZED HEALTH CARE EDUCATION/TRAINING PROGRAM

## 2024-10-06 PROCEDURE — 59409 OBSTETRICAL CARE: CPT | Mod: AA,,, | Performed by: ANESTHESIOLOGY

## 2024-10-06 PROCEDURE — 72200005 HC VAGINAL DELIVERY LEVEL II

## 2024-10-06 PROCEDURE — 59025 FETAL NON-STRESS TEST: CPT | Mod: 26,59,, | Performed by: STUDENT IN AN ORGANIZED HEALTH CARE EDUCATION/TRAINING PROGRAM

## 2024-10-06 PROCEDURE — 86593 SYPHILIS TEST NON-TREP QUANT: CPT | Performed by: STUDENT IN AN ORGANIZED HEALTH CARE EDUCATION/TRAINING PROGRAM

## 2024-10-06 PROCEDURE — 76815 OB US LIMITED FETUS(S): CPT | Mod: 26,,, | Performed by: STUDENT IN AN ORGANIZED HEALTH CARE EDUCATION/TRAINING PROGRAM

## 2024-10-06 PROCEDURE — 58300 INSERT INTRAUTERINE DEVICE: CPT

## 2024-10-06 PROCEDURE — 59409 OBSTETRICAL CARE: CPT | Mod: GB,,, | Performed by: STUDENT IN AN ORGANIZED HEALTH CARE EDUCATION/TRAINING PROGRAM

## 2024-10-06 PROCEDURE — 11000001 HC ACUTE MED/SURG PRIVATE ROOM

## 2024-10-06 PROCEDURE — 4A0HXCZ MEASUREMENT OF PRODUCTS OF CONCEPTION, CARDIAC RATE, EXTERNAL APPROACH: ICD-10-PCS | Performed by: STUDENT IN AN ORGANIZED HEALTH CARE EDUCATION/TRAINING PROGRAM

## 2024-10-06 RX ORDER — OXYTOCIN-SODIUM CHLORIDE 0.9% IV SOLN 30 UNIT/500ML 30-0.9/5 UT/ML-%
95 SOLUTION INTRAVENOUS ONCE AS NEEDED
Status: CANCELLED | OUTPATIENT
Start: 2024-10-06 | End: 2036-03-04

## 2024-10-06 RX ORDER — TRANEXAMIC ACID 10 MG/ML
1000 INJECTION, SOLUTION INTRAVENOUS EVERY 30 MIN PRN
Status: CANCELLED | OUTPATIENT
Start: 2024-10-06

## 2024-10-06 RX ORDER — DIPHENOXYLATE HYDROCHLORIDE AND ATROPINE SULFATE 2.5; .025 MG/1; MG/1
2 TABLET ORAL EVERY 6 HOURS PRN
Status: CANCELLED | OUTPATIENT
Start: 2024-10-06

## 2024-10-06 RX ORDER — DIPHENHYDRAMINE HCL 25 MG
25 CAPSULE ORAL EVERY 4 HOURS PRN
Status: DISCONTINUED | OUTPATIENT
Start: 2024-10-06 | End: 2024-10-08 | Stop reason: HOSPADM

## 2024-10-06 RX ORDER — OXYTOCIN 10 [USP'U]/ML
10 INJECTION, SOLUTION INTRAMUSCULAR; INTRAVENOUS ONCE AS NEEDED
Status: DISCONTINUED | OUTPATIENT
Start: 2024-10-06 | End: 2024-10-06

## 2024-10-06 RX ORDER — ONDANSETRON 8 MG/1
8 TABLET, ORALLY DISINTEGRATING ORAL EVERY 8 HOURS PRN
Status: CANCELLED | OUTPATIENT
Start: 2024-10-06

## 2024-10-06 RX ORDER — CALCIUM CARBONATE 200(500)MG
500 TABLET,CHEWABLE ORAL 3 TIMES DAILY PRN
Status: DISCONTINUED | OUTPATIENT
Start: 2024-10-06 | End: 2024-10-06

## 2024-10-06 RX ORDER — LIDOCAINE HYDROCHLORIDE 10 MG/ML
10 INJECTION, SOLUTION INFILTRATION; PERINEURAL ONCE AS NEEDED
Status: DISCONTINUED | OUTPATIENT
Start: 2024-10-06 | End: 2024-10-06

## 2024-10-06 RX ORDER — PRENATAL WITH FERROUS FUM AND FOLIC ACID 3080; 920; 120; 400; 22; 1.84; 3; 20; 10; 1; 12; 200; 27; 25; 2 [IU]/1; [IU]/1; MG/1; [IU]/1; MG/1; MG/1; MG/1; MG/1; MG/1; MG/1; UG/1; MG/1; MG/1; MG/1; MG/1
1 TABLET ORAL DAILY
Status: DISCONTINUED | OUTPATIENT
Start: 2024-10-07 | End: 2024-10-08 | Stop reason: HOSPADM

## 2024-10-06 RX ORDER — HYDROCODONE BITARTRATE AND ACETAMINOPHEN 5; 325 MG/1; MG/1
1 TABLET ORAL EVERY 4 HOURS PRN
Status: DISCONTINUED | OUTPATIENT
Start: 2024-10-06 | End: 2024-10-08 | Stop reason: HOSPADM

## 2024-10-06 RX ORDER — MISOPROSTOL 200 UG/1
800 TABLET ORAL ONCE AS NEEDED
Status: DISCONTINUED | OUTPATIENT
Start: 2024-10-06 | End: 2024-10-06

## 2024-10-06 RX ORDER — IBUPROFEN 600 MG/1
600 TABLET ORAL EVERY 6 HOURS
Status: DISCONTINUED | OUTPATIENT
Start: 2024-10-06 | End: 2024-10-08 | Stop reason: HOSPADM

## 2024-10-06 RX ORDER — METHYLERGONOVINE MALEATE 0.2 MG/ML
200 INJECTION INTRAVENOUS ONCE AS NEEDED
Status: CANCELLED | OUTPATIENT
Start: 2024-10-06 | End: 2036-03-04

## 2024-10-06 RX ORDER — SODIUM CHLORIDE 0.9 % (FLUSH) 0.9 %
10 SYRINGE (ML) INJECTION EVERY 6 HOURS PRN
Status: CANCELLED | OUTPATIENT
Start: 2024-10-06

## 2024-10-06 RX ORDER — TRANEXAMIC ACID 10 MG/ML
1000 INJECTION, SOLUTION INTRAVENOUS EVERY 30 MIN PRN
Status: DISCONTINUED | OUTPATIENT
Start: 2024-10-06 | End: 2024-10-06

## 2024-10-06 RX ORDER — OXYTOCIN-SODIUM CHLORIDE 0.9% IV SOLN 30 UNIT/500ML 30-0.9/5 UT/ML-%
10 SOLUTION INTRAVENOUS ONCE AS NEEDED
Status: CANCELLED | OUTPATIENT
Start: 2024-10-06 | End: 2036-03-04

## 2024-10-06 RX ORDER — OXYTOCIN-SODIUM CHLORIDE 0.9% IV SOLN 30 UNIT/500ML 30-0.9/5 UT/ML-%
0-32 SOLUTION INTRAVENOUS CONTINUOUS
Status: DISCONTINUED | OUTPATIENT
Start: 2024-10-06 | End: 2024-10-06

## 2024-10-06 RX ORDER — FENTANYL/BUPIVACAINE/NS/PF 2MCG/ML-.1
PLASTIC BAG, INJECTION (ML) INJECTION
Status: COMPLETED
Start: 2024-10-06 | End: 2024-10-06

## 2024-10-06 RX ORDER — IBUPROFEN 600 MG/1
600 TABLET ORAL EVERY 6 HOURS
Qty: 30 TABLET | Refills: 1 | Status: SHIPPED | OUTPATIENT
Start: 2024-10-07

## 2024-10-06 RX ORDER — FENTANYL/BUPIVACAINE/NS/PF 2MCG/ML-.1
PLASTIC BAG, INJECTION (ML) INJECTION CONTINUOUS
OUTPATIENT
Start: 2024-10-06

## 2024-10-06 RX ORDER — OXYTOCIN 10 [USP'U]/ML
10 INJECTION, SOLUTION INTRAMUSCULAR; INTRAVENOUS ONCE AS NEEDED
Status: CANCELLED | OUTPATIENT
Start: 2024-10-06 | End: 2036-03-04

## 2024-10-06 RX ORDER — HYDROCODONE BITARTRATE AND ACETAMINOPHEN 10; 325 MG/1; MG/1
1 TABLET ORAL EVERY 4 HOURS PRN
Status: DISCONTINUED | OUTPATIENT
Start: 2024-10-06 | End: 2024-10-08 | Stop reason: HOSPADM

## 2024-10-06 RX ORDER — SIMETHICONE 80 MG
1 TABLET,CHEWABLE ORAL EVERY 6 HOURS PRN
Status: DISCONTINUED | OUTPATIENT
Start: 2024-10-06 | End: 2024-10-08 | Stop reason: HOSPADM

## 2024-10-06 RX ORDER — METHYLERGONOVINE MALEATE 0.2 MG/ML
200 INJECTION INTRAVENOUS ONCE AS NEEDED
Status: DISCONTINUED | OUTPATIENT
Start: 2024-10-06 | End: 2024-10-06

## 2024-10-06 RX ORDER — OXYTOCIN-SODIUM CHLORIDE 0.9% IV SOLN 30 UNIT/500ML 30-0.9/5 UT/ML-%
30 SOLUTION INTRAVENOUS ONCE AS NEEDED
Status: CANCELLED | OUTPATIENT
Start: 2024-10-06 | End: 2036-03-04

## 2024-10-06 RX ORDER — ONDANSETRON 8 MG/1
8 TABLET, ORALLY DISINTEGRATING ORAL EVERY 8 HOURS PRN
Status: DISCONTINUED | OUTPATIENT
Start: 2024-10-06 | End: 2024-10-06

## 2024-10-06 RX ORDER — DIPHENOXYLATE HYDROCHLORIDE AND ATROPINE SULFATE 2.5; .025 MG/1; MG/1
2 TABLET ORAL EVERY 6 HOURS PRN
Status: DISCONTINUED | OUTPATIENT
Start: 2024-10-06 | End: 2024-10-06

## 2024-10-06 RX ORDER — ACETAMINOPHEN 325 MG/1
650 TABLET ORAL EVERY 6 HOURS SCHEDULED
Status: DISCONTINUED | OUTPATIENT
Start: 2024-10-06 | End: 2024-10-08 | Stop reason: HOSPADM

## 2024-10-06 RX ORDER — MISOPROSTOL 200 UG/1
800 TABLET ORAL ONCE AS NEEDED
Status: CANCELLED | OUTPATIENT
Start: 2024-10-06 | End: 2036-03-04

## 2024-10-06 RX ORDER — MISOPROSTOL 200 UG/1
800 TABLET ORAL ONCE AS NEEDED
Status: CANCELLED | OUTPATIENT
Start: 2024-10-06

## 2024-10-06 RX ORDER — OXYTOCIN-SODIUM CHLORIDE 0.9% IV SOLN 30 UNIT/500ML 30-0.9/5 UT/ML-%
95 SOLUTION INTRAVENOUS CONTINUOUS PRN
Status: DISCONTINUED | OUTPATIENT
Start: 2024-10-06 | End: 2024-10-06

## 2024-10-06 RX ORDER — DOCUSATE SODIUM 100 MG/1
200 CAPSULE, LIQUID FILLED ORAL 2 TIMES DAILY
Status: DISCONTINUED | OUTPATIENT
Start: 2024-10-06 | End: 2024-10-08 | Stop reason: HOSPADM

## 2024-10-06 RX ORDER — SODIUM CHLORIDE, SODIUM LACTATE, POTASSIUM CHLORIDE, CALCIUM CHLORIDE 600; 310; 30; 20 MG/100ML; MG/100ML; MG/100ML; MG/100ML
INJECTION, SOLUTION INTRAVENOUS CONTINUOUS
Status: DISCONTINUED | OUTPATIENT
Start: 2024-10-06 | End: 2024-10-06

## 2024-10-06 RX ORDER — DIPHENHYDRAMINE HYDROCHLORIDE 50 MG/ML
25 INJECTION INTRAMUSCULAR; INTRAVENOUS EVERY 4 HOURS PRN
Status: DISCONTINUED | OUTPATIENT
Start: 2024-10-06 | End: 2024-10-08 | Stop reason: HOSPADM

## 2024-10-06 RX ORDER — SODIUM CHLORIDE 9 MG/ML
INJECTION, SOLUTION INTRAVENOUS
Status: DISCONTINUED | OUTPATIENT
Start: 2024-10-06 | End: 2024-10-06

## 2024-10-06 RX ORDER — LANOLIN ALCOHOL/MO/W.PET/CERES
1 CREAM (GRAM) TOPICAL DAILY
Status: DISCONTINUED | OUTPATIENT
Start: 2024-10-06 | End: 2024-10-08 | Stop reason: HOSPADM

## 2024-10-06 RX ORDER — DOCUSATE SODIUM 100 MG/1
200 CAPSULE, LIQUID FILLED ORAL 2 TIMES DAILY
Qty: 60 CAPSULE | Refills: 0 | Status: SHIPPED | OUTPATIENT
Start: 2024-10-06

## 2024-10-06 RX ORDER — CARBOPROST TROMETHAMINE 250 UG/ML
250 INJECTION, SOLUTION INTRAMUSCULAR
Status: CANCELLED | OUTPATIENT
Start: 2024-10-06

## 2024-10-06 RX ORDER — HYDROCORTISONE 25 MG/G
CREAM TOPICAL 3 TIMES DAILY PRN
Status: DISCONTINUED | OUTPATIENT
Start: 2024-10-06 | End: 2024-10-08 | Stop reason: HOSPADM

## 2024-10-06 RX ORDER — LIDOCAINE HYDROCHLORIDE AND EPINEPHRINE 15; 5 MG/ML; UG/ML
INJECTION, SOLUTION EPIDURAL
Status: DISCONTINUED | OUTPATIENT
Start: 2024-10-06 | End: 2024-10-06

## 2024-10-06 RX ORDER — OXYTOCIN-SODIUM CHLORIDE 0.9% IV SOLN 30 UNIT/500ML 30-0.9/5 UT/ML-%
95 SOLUTION INTRAVENOUS ONCE AS NEEDED
Status: DISCONTINUED | OUTPATIENT
Start: 2024-10-06 | End: 2024-10-06

## 2024-10-06 RX ORDER — DOCUSATE SODIUM 100 MG/1
200 CAPSULE, LIQUID FILLED ORAL 2 TIMES DAILY PRN
Status: DISCONTINUED | OUTPATIENT
Start: 2024-10-06 | End: 2024-10-06

## 2024-10-06 RX ORDER — FENTANYL/BUPIVACAINE/NS/PF 2MCG/ML-.1
PLASTIC BAG, INJECTION (ML) INJECTION
Status: DISCONTINUED | OUTPATIENT
Start: 2024-10-06 | End: 2024-10-06

## 2024-10-06 RX ORDER — ACETAMINOPHEN 325 MG/1
650 TABLET ORAL EVERY 6 HOURS
Qty: 30 TABLET | Refills: 1 | Status: SHIPPED | OUTPATIENT
Start: 2024-10-07

## 2024-10-06 RX ORDER — DOCUSATE SODIUM 100 MG/1
200 CAPSULE, LIQUID FILLED ORAL 2 TIMES DAILY PRN
Qty: 120 CAPSULE | Refills: 0 | Status: SHIPPED | OUTPATIENT
Start: 2024-10-06 | End: 2024-10-06 | Stop reason: HOSPADM

## 2024-10-06 RX ORDER — OXYTOCIN-SODIUM CHLORIDE 0.9% IV SOLN 30 UNIT/500ML 30-0.9/5 UT/ML-%
10 SOLUTION INTRAVENOUS ONCE AS NEEDED
Status: DISCONTINUED | OUTPATIENT
Start: 2024-10-06 | End: 2024-10-06

## 2024-10-06 RX ORDER — CARBOPROST TROMETHAMINE 250 UG/ML
250 INJECTION, SOLUTION INTRAMUSCULAR
Status: DISCONTINUED | OUTPATIENT
Start: 2024-10-06 | End: 2024-10-06

## 2024-10-06 RX ORDER — OXYTOCIN-SODIUM CHLORIDE 0.9% IV SOLN 30 UNIT/500ML 30-0.9/5 UT/ML-%
95 SOLUTION INTRAVENOUS CONTINUOUS PRN
Status: DISCONTINUED | OUTPATIENT
Start: 2024-10-06 | End: 2024-10-08 | Stop reason: HOSPADM

## 2024-10-06 RX ORDER — SIMETHICONE 80 MG
1 TABLET,CHEWABLE ORAL 4 TIMES DAILY PRN
Status: DISCONTINUED | OUTPATIENT
Start: 2024-10-06 | End: 2024-10-06

## 2024-10-06 RX ADMIN — Medication 4 MILLI-UNITS/MIN: at 11:10

## 2024-10-06 RX ADMIN — IBUPROFEN 600 MG: 600 TABLET, FILM COATED ORAL at 07:10

## 2024-10-06 RX ADMIN — ACETAMINOPHEN 650 MG: 325 TABLET, FILM COATED ORAL at 07:10

## 2024-10-06 RX ADMIN — FENTANYL CITRATE 5 ML: 50 INJECTION INTRAMUSCULAR; INTRAVENOUS at 09:10

## 2024-10-06 RX ADMIN — LIDOCAINE HYDROCHLORIDE,EPINEPHRINE BITARTRATE 3 ML: 15; .005 INJECTION, SOLUTION EPIDURAL; INFILTRATION; INTRACAUDAL; PERINEURAL at 08:10

## 2024-10-06 RX ADMIN — FENTANYL CITRATE 8 ML/HR: 50 INJECTION INTRAMUSCULAR; INTRAVENOUS at 09:10

## 2024-10-06 RX ADMIN — SODIUM CHLORIDE, POTASSIUM CHLORIDE, SODIUM LACTATE AND CALCIUM CHLORIDE: 600; 310; 30; 20 INJECTION, SOLUTION INTRAVENOUS at 09:10

## 2024-10-06 NOTE — L&D DELIVERY NOTE
[facility] - Labor & Delivery  Vaginal Delivery   Operative Note    SUMMARY      cephalic after approximately 5 minutes of maternal pushing.  Under epidural anesthesia.  Infant delivered OA over  perineum.  Female infant also tolerated the delivery well and was placed on mothers abdomen for skin to skin and bulb suctioning performed.  Cord clamped and cut.  Umbilical venous blood obtained.  No perineal lacerations noted.   Placenta delivered spontaneously and IV pitocin given.  Uterine tone noted. No cervical lacerations.  Postplacental IUD placed at the level of the fundus, see information below.   Patient tolerated delivery well.  EBL 50 cc  Staff present for entire procedure.  S/L/N counts correct x2.    Mirena IUD:   - Lot#: [id number]  - Exp: Oct/2026       Radha Brown MD   OBN Hospitalist         Indications:  (spontaneous vaginal delivery)  Pregnancy complicated by:   Patient Active Problem List   Diagnosis    History of depression    Antepartum multigravida of advanced maternal age    Anemia affecting pregnancy, antepartum    Encounter for initial prescription of contraceptives- desires IUD at [facility] (spontaneous vaginal delivery)     Admitting GA: 40w3d    Delivery Information for Soraida Cedillo    Birth information:  YOB: 2022   Time of birth: 4:46 PM   Sex: female   Head Delivery Date/Time: 10/6/2024  4:45 PM   Delivery type: Vaginal, Spontaneous   Gestational Age: 40w3d       Delivery Providers    Delivering clinician: Radha Brown MD   Provider Role    Ezio Cordero, Radha Nascimento, Anabella Lester RN               Measurements    Weight:   Length:          Apgars    Living status: Living  Apgar Component Scores:  1 min.:  5 min.:  10 min.:  15 min.:  20 min.:    Skin color:  1  1       Heart rate:  2  2       Reflex irritability:  2  2       Muscle tone:  2  2       Respiratory effort:  2  2       Total:  9  9       Apgars assigned by: EMORY  BERHANE ASTORGA         Operative Delivery    Forceps attempted?: No  Vacuum extractor attempted?: No         Shoulder Dystocia    Shoulder dystocia present?: No           Presentation    Presentation: Vertex  Position: Left Occiput Anterior           Interventions/Resuscitation    Method: Bulb Suctioning, Tactile Stimulation       Cord    Vessels: 3 vessels  Complications: None  Delayed Cord Clamping?: Yes  Cord Clamped Date/Time: 10/6/2024  4:47 PM  Cord Blood Disposition: Sent with Baby  Gases Sent?: No  Stem Cell Collection (by MD): No       Placenta    Placenta delivery date/time: 10/6/2024 1649  Placenta removal: Expressed  Placenta appearance: Intact  Placenta disposition: Discarded           Labor Events:       labor:       Labor Onset Date/Time:         Dilation Complete Date/Time:         Start Pushing Date/Time: 10/06/2024 16:44       Start Pushing Date/Time: 10/06/2024 16:44     Rupture Date/Time: 10/06/24 1010        Rupture type: ARM (Artificial Rupture)        Fluid Amount:       Fluid Color: Clear              steroids:       Antibiotics given for GBS:       Induction:       Indications for induction:        Augmentation:       Indications for augmentation:       Labor complications: None     Additional complications:          Cervical ripening:                     Delivery:      Episiotomy: None     Indication for Episiotomy:       Perineal Lacerations: None Repaired:      Periurethral Laceration:   Repaired:     Labial Laceration:   Repaired:     Sulcus Laceration:   Repaired:     Vaginal Laceration:   Repaired:     Cervical Laceration:   Repaired:     Repair suture: None     Repair # of packets:       Last Value - EBL - Nursing (mL):       Sum - EBL - Nursing (mL): 0     Last Value - EBL - Anesthesia (mL):      Calculated QBL (mL): 50     Running total QBL (mL): 50     Vaginal Sweep Performed: Yes     Surgicount Correct: Yes     Vaginal Packing: No Quantity:       Other providers:        Anesthesia    Method: Epidural          Details (if applicable):  Trial of Labor      Categorization:      Priority:     Indications for :     Incision Type:       Additional  information:  Forceps:    Vacuum:    Breech:    Observed anomalies    Other (Comments):

## 2024-10-06 NOTE — PROGRESS NOTES
LABOR NOTE    S:  MD to bedside for routine cervical exam. Epidural working:  yes      O: /61   Pulse 71   Temp 98.3 °F (36.8 °C) (Oral)   Resp 16   LMP 12/30/2023   SpO2 100%   Breastfeeding No     FHT: 135 bpm, moderate variability, + accels, - decels, Cat 1 (reassuring)  CTX: q 5 minutes  SVE: 5/70/-2, AROM Clr    TIMELINE:  1000: 5/70/-2, AROM Clr    PLAN:    Continue Close Maternal/Fetal Monitoring  Start pitocin Augmentation per protocol  Recheck 4 hours or PRN    Francesca Talley MD  Obstetrics and Gynecology, PGY-2

## 2024-10-06 NOTE — PROGRESS NOTES
"LABOR NOTE    S:  MD to bedside for FHT decelerations. Epidural working:  yes    O: BP (!) 109/57   Pulse 85   Temp 98.3 °F (36.8 °C) (Oral)   Resp 16   Ht 5' 4" (1.626 m)   Wt 69 kg (152 lb 1.9 oz)   LMP 12/30/2023   SpO2 98%   Breastfeeding No   BMI 26.11 kg/m²     FHT: 130 bpm, moderate variability, + accels, + intermittent late decels, Cat 1 (reassuring)  CTX: q 5 minutes  SVE: 6/80/-1    TIMELINE:  1000: 5/70/-2, AROM Clr  1230: 6/80/-1    PLAN:  Continue Close Maternal/Fetal Monitoring  Pitocin Augmentation per protocol  Recheck 2 hours or PRN  Will continue maternal repositioning efforts for intermittent late decelerations, if not improving will consider decreasing pitocin    Rakel Cisneros MD  PGY-4 OB/GYN        "

## 2024-10-06 NOTE — ANESTHESIA PREPROCEDURE EVALUATION
[facility]  Anesthesia Pre-Operative Evaluation         Patient Name: Katey Cedillo  YOB: 1985  MRN: [id number]    10/06/2024      Katey Cedillo is a 39 y.o. female  @ 40w3d who presents for normal labor. PMHx of anemia, depression, GERD. This IUP c/b anemia, AMA.    Reports hx of neuraxial anesthesia - epidural x2.    Denies hx of asthma, HTN, bleeding or coagulation disorders, spine pathology or surgery.    OB History    Para Term  AB Living   3 2 2     2   SAB IAB Ectopic Multiple Live Births         0 2      # Outcome Date GA Lbr Kaden/2nd Weight Sex Type Anes PTL Lv   3 Current            2 Term 19 39w4d  3.374 kg (7 lb 7 oz) M Vag-Spont EPI  YUMIKO   1 Term 10/22/13 38w0d  2.892 kg (6 lb 6 oz) F Vag-Spont EPI N YUMIKO      Birth Comments: water broke evening        Review of patient's allergies indicates:  No Known Allergies    Wt Readings from Last 1 Encounters:   24 0928 69 kg (152 lb 1.9 oz)       BP Readings from Last 3 Encounters:   10/06/24 118/61   24 100/62   24 (!) 96/58       Patient Active Problem List   Diagnosis    History of depression    Antepartum multigravida of advanced maternal age    Anemia affecting pregnancy, antepartum    Encounter for initial prescription of contraceptives- desires IUD at del    Normal labor       No past surgical history on file.    Social History     Socioeconomic History    Marital status: Significant Other     Spouse name: Alvarado-partner   Occupational History    Occupation: [employer]   Tobacco Use    Smoking status: Never    Smokeless tobacco: Never   Substance and Sexual Activity    Alcohol use: Not Currently    Drug use: Never    Sexual activity: Yes     Partners: Male     Comment: [place]     Social Drivers of Health     Financial Resource Strain: Low Risk  (2024)    Overall Financial Resource Strain (CARDIA)     Difficulty of Paying Living Expenses: Not very hard   Food Insecurity:  "No Food Insecurity (9/17/2024)    Hunger Vital Sign     Worried About Running Out of Food in the Last Year: Never true     Ran Out of Food in the Last Year: Never true   Transportation Needs: No Transportation Needs (9/17/2024)    TRANSPORTATION NEEDS     Transportation : No   Physical Activity: Insufficiently Active (9/17/2024)    Exercise Vital Sign     Days of Exercise per Week: 2 days     Minutes of Exercise per Session: 30 min   Stress: No Stress Concern Present (9/17/2024)    Citizen of [place] [employer] - Occupational Stress Questionnaire     Feeling of Stress : Not at all   Housing Stability: Low Risk  (9/17/2024)    Housing Stability Vital Sign     Unable to Pay for Housing in the Last Year: No     Homeless in the Last Year: No         Chemistry        Component Value Date/Time     07/29/2024 1141    K 3.9 07/29/2024 1141     07/29/2024 1141    CO2 23 07/29/2024 1141    BUN 6 07/29/2024 1141    CREATININE 0.6 07/29/2024 1141    GLU 77 07/29/2024 1141        Component Value Date/Time    CALCIUM 8.9 07/29/2024 1141    ALKPHOS 80 07/29/2024 1141    AST 17 07/29/2024 1141    ALT 15 07/29/2024 1141    BILITOT 0.3 07/29/2024 1141    ESTGFRAFRICA >60.0 04/15/2022 1543    EGFRNONAA >60.0 04/15/2022 1543            Lab Results   Component Value Date    WBC 9.42 10/06/2024    HGB 10.7 (L) 10/06/2024    HCT 32.0 (L) 10/06/2024    MCV 87 10/06/2024     10/06/2024       No results for input(s): "PT", "INR", "PROTIME", "APTT" in the last 72 hours.            Pre-op Assessment    I have reviewed the Patient Summary Reports.     I have reviewed the Nursing Notes. I have reviewed the NPO Status.   I have reviewed the Medications.     Review of Systems  Anesthesia Hx:   Neg history of prior surgery.          Denies Family Hx of Anesthesia complications.    Denies Personal Hx of Anesthesia complications.                    Social:  Non-Smoker       Hematology/Oncology:       -- Anemia:           "                        Cardiovascular:  Cardiovascular Normal                                            Pulmonary:  Pulmonary Normal                       Renal/:  Renal/ Normal                 Hepatic/GI:     GERD             Musculoskeletal:  Musculoskeletal Normal                Neurological:  Neurology Normal                                      Endocrine:  Endocrine Normal            Psych:    depression                Physical Exam  General: Cooperative, Alert and Oriented    Airway:  Mallampati: II   Mouth Opening: Normal  TM Distance: Normal  Tongue: Normal  Neck ROM: Normal ROM    Dental:  Intact    Chest/Lungs:  Normal Respiratory Rate    Heart:  Rate: Normal        Anesthesia Plan  Type of Anesthesia, risks & benefits discussed:    Anesthesia Type: Gen ETT, Epidural, Spinal, CSE  Intra-op Monitoring Plan: Standard ASA Monitors  Post Op Pain Control Plan: multimodal analgesia and IV/PO Opioids PRN  Induction:  IV  Airway Plan: Direct and Video, Post-Induction  Informed Consent: Informed consent signed with the Patient and all parties understand the risks and agree with anesthesia plan.  All questions answered.   ASA Score: 3  Day of Surgery Review of History & Physical: H&P Update referred to the surgeon/provider.    Ready For Surgery From Anesthesia Perspective.     .

## 2024-10-06 NOTE — INTERVAL H&P NOTE
Katey Cedillo is 39 y.o.  at 40w3d wga presenting for normal labor.     Temp:  [98.3 °F (36.8 °C)] 98.3 °F (36.8 °C)  Pulse:  [71] 71  Resp:  [16] 16  SpO2:  [100 %] 100 %  BP: (118)/(61) 118/61    FHT: 140 bpm, moderate BTBV, +accels, -decels; Cat 1 (reassuring)  [place]: q 5mins  Presentation: cephalic by ultrasound    SVE:     1) Normal Labor  - Risks, benefits, alternatives and possible complications have been discussed in detail with the patient.   - Consents in chart  - Admit to Labor and Delivery unit  - Epidural per Anesthesia  - Draw CBC, T&S  - Notify Staff  - Recheck in 2 hrs or PRN  - Maternal pelvis 7 lbs 7 oz    2) AMA  - encourage ambulation   - post partum lovenox: not indicated    3) Anemia   - H/H as above  - asymptomatic  - iron/colace    Contraception: post partum IUD    Active Hospital Problems    Diagnosis  POA    *Normal labor [O80, Z37.9]  Not Applicable      Resolved Hospital Problems   No resolved problems to display.     The patient has been examined and the H&P has been reviewed:    I concur with the findings and no changes have occurred since H&P was written.    Aidee Muniz  [facility]   OBGYN PGY1

## 2024-10-06 NOTE — ANESTHESIA PROCEDURE NOTES
Epidural    Patient location during procedure: OB   Reason for block: primary anesthetic   Reason for block: labor analgesia requested by patient and obstetrician  Diagnosis: IUP   Start time: 10/6/2024 8:47 AM  Timeout: 10/6/2024 8:46 AM  End time: 10/6/2024 9:03 AM  Surgery related to: Vaginal Delivery    Staffing  Performing Provider: Trish Lambert MD  Authorizing Provider: Sandra Shelton MD    Staffing  Performed by: Trish Lambert MD  Authorized by: Sandra Shelton MD        Preanesthetic Checklist  Completed: patient identified, IV checked, site marked, risks and benefits discussed, surgical consent, monitors and equipment checked, pre-op evaluation, timeout performed, anesthesia consent given, hand hygiene performed and patient being monitored  Preparation  Patient position: sitting  Prep: ChloraPrep  Patient monitoring: Pulse Ox  Reason for block: primary anesthetic   Epidural  Skin Anesthetic: lidocaine 1%  Skin Wheal: 3 mL  Administration type: continuous  Approach: midline  Interspace: L3-4    Injection technique: DENILSON saline  Needle and Epidural Catheter  Needle type: Tuohy   Needle gauge: 17  Needle length: 3.5 inches  Needle insertion depth: 5 cm  Catheter type: LIFE INTERACTION  Catheter size: 19 G  Catheter at skin depth: 9 cm  Insertion Attempts: 1  Test dose: 3 mL of lidocaine 1.5% with Epi 1-to-200,000  Additional Documentation: incremental injection, negative aspiration for heme and CSF, no paresthesia on injection, no signs/symptoms of IV or SA injection, no significant pain on injection and no significant complaints from patient  Needle localization: anatomical landmarks  Medications:  Volume per aspiration: 5 mL  Time between aspirations: 5 minutes   Assessment  Ease of block: easy  Patient's tolerance of the procedure: comfortable throughout block and no complaints No inadvertent dural puncture with Tuohy.  Dural puncture performed with spinal needle.

## 2024-10-07 LAB
BASOPHILS # BLD AUTO: 0.06 K/UL (ref 0–0.2)
BASOPHILS NFR BLD: 0.4 % (ref 0–1.9)
DIFFERENTIAL METHOD BLD: ABNORMAL
EOSINOPHIL # BLD AUTO: 0.1 K/UL (ref 0–0.5)
EOSINOPHIL NFR BLD: 0.7 % (ref 0–8)
ERYTHROCYTE [DISTWIDTH] IN BLOOD BY AUTOMATED COUNT: 13.7 % (ref 11.5–14.5)
HCT VFR BLD AUTO: 31.9 % (ref 37–48.5)
HGB BLD-MCNC: 10.2 G/DL (ref 12–16)
IMM GRANULOCYTES # BLD AUTO: 0.08 K/UL (ref 0–0.04)
IMM GRANULOCYTES NFR BLD AUTO: 0.5 % (ref 0–0.5)
LYMPHOCYTES # BLD AUTO: 2.4 K/UL (ref 1–4.8)
LYMPHOCYTES NFR BLD: 16.2 % (ref 18–48)
MCH RBC QN AUTO: 28.6 PG (ref 27–31)
MCHC RBC AUTO-ENTMCNC: 32 G/DL (ref 32–36)
MCV RBC AUTO: 89 FL (ref 82–98)
MONOCYTES # BLD AUTO: 0.9 K/UL (ref 0.3–1)
MONOCYTES NFR BLD: 6.1 % (ref 4–15)
NEUTROPHILS # BLD AUTO: 11.4 K/UL (ref 1.8–7.7)
NEUTROPHILS NFR BLD: 76.1 % (ref 38–73)
NRBC BLD-RTO: 0 /100 WBC
PLATELET # BLD AUTO: 225 K/UL (ref 150–450)
PMV BLD AUTO: 9.6 FL (ref 9.2–12.9)
RBC # BLD AUTO: 3.57 M/UL (ref 4–5.4)
WBC # BLD AUTO: 14.96 K/UL (ref 3.9–12.7)

## 2024-10-07 PROCEDURE — 25000003 PHARM REV CODE 250

## 2024-10-07 PROCEDURE — 36415 COLL VENOUS BLD VENIPUNCTURE: CPT | Performed by: STUDENT IN AN ORGANIZED HEALTH CARE EDUCATION/TRAINING PROGRAM

## 2024-10-07 PROCEDURE — 25000003 PHARM REV CODE 250: Performed by: STUDENT IN AN ORGANIZED HEALTH CARE EDUCATION/TRAINING PROGRAM

## 2024-10-07 PROCEDURE — 11000001 HC ACUTE MED/SURG PRIVATE ROOM

## 2024-10-07 PROCEDURE — 85025 COMPLETE CBC W/AUTO DIFF WBC: CPT | Performed by: STUDENT IN AN ORGANIZED HEALTH CARE EDUCATION/TRAINING PROGRAM

## 2024-10-07 RX ADMIN — IBUPROFEN 600 MG: 600 TABLET, FILM COATED ORAL at 10:10

## 2024-10-07 RX ADMIN — IBUPROFEN 600 MG: 600 TABLET, FILM COATED ORAL at 01:10

## 2024-10-07 RX ADMIN — ACETAMINOPHEN 650 MG: 325 TABLET, FILM COATED ORAL at 01:10

## 2024-10-07 RX ADMIN — IBUPROFEN 600 MG: 600 TABLET, FILM COATED ORAL at 08:10

## 2024-10-07 RX ADMIN — ACETAMINOPHEN 650 MG: 325 TABLET, FILM COATED ORAL at 10:10

## 2024-10-07 RX ADMIN — DOCUSATE SODIUM 200 MG: 100 CAPSULE, LIQUID FILLED ORAL at 07:10

## 2024-10-07 RX ADMIN — PRENATAL VIT W/ FE FUMARATE-FA TAB 27-0.8 MG 1 TABLET: 27-0.8 TAB at 08:10

## 2024-10-07 RX ADMIN — ACETAMINOPHEN 650 MG: 325 TABLET, FILM COATED ORAL at 04:10

## 2024-10-07 RX ADMIN — FERROUS SULFATE TAB 325 MG (65 MG ELEMENTAL FE) 1 EACH: 325 (65 FE) TAB at 08:10

## 2024-10-07 RX ADMIN — IBUPROFEN 600 MG: 600 TABLET, FILM COATED ORAL at 04:10

## 2024-10-07 RX ADMIN — DOCUSATE SODIUM 200 MG: 100 CAPSULE, LIQUID FILLED ORAL at 08:10

## 2024-10-07 RX ADMIN — ACETAMINOPHEN 650 MG: 325 TABLET, FILM COATED ORAL at 08:10

## 2024-10-07 NOTE — ANESTHESIA POSTPROCEDURE EVALUATION
Anesthesia Post Evaluation    Patient: Katey Cedillo    Procedure(s) Performed: * No procedures listed *    Final Anesthesia Type: epidural      Patient location during evaluation: labor & delivery  Patient participation: Yes- Able to Participate  Level of consciousness: awake and alert, awake and oriented  Post-procedure vital signs: reviewed and stable  Pain management: adequate  Airway patency: patent  SUSIE mitigation strategies: Multimodal analgesia and Use of major conduction anesthesia (spinal/epidural) or peripheral nerve block  PONV status at discharge: No PONV  Anesthetic complications: no      Cardiovascular status: blood pressure returned to baseline and hemodynamically stable  Respiratory status: unassisted, spontaneous ventilation and room air  Hydration status: euvolemic  Follow-up not needed.              Vitals Value Taken Time   /59 10/07/24 0817   Temp 36.6 °C (97.9 °F) 10/07/24 0817   Pulse 75 10/07/24 0817   Resp 18 10/07/24 0817   SpO2 99 % 10/07/24 0817         No case tracking events are documented in the log.      Pain/Noemy Score: Pain Rating Prior to Med Admin: 5 (10/7/2024  8:35 AM)  Pain Rating Post Med Admin: 0 (10/7/2024  9:17 AM)

## 2024-10-07 NOTE — PROGRESS NOTES
[facility])  Obstetrics  Postpartum Progress Note    Patient Name: Katey Cedillo  MRN: [id number]  Admission Date: 10/6/2024  Hospital Length of Stay: 1 days  Attending Physician: Mady Peter MD  Primary Care Provider: Ya Giles MD    Subjective:     Principal Problem: (spontaneous vaginal delivery)    Hospital Course:  10/7/24: PPD1, doing well, breast feeding, normal involution, routine PP care, plan to d/c tomorrow    Interval History: PPD1    Doing well, ambulating, voiding, and tolerating regular diet  Denies dizziness or light headed sensation. Denies SOB or difficulty breathing.   Denies headaches, visual disturbances or upper GI pain, nausea, or vomiting.  Reports passing flatus.   Lochia: steadily decreasing  Pain: well controlled with Ibuprofen  Breasts/nipples: breast feeding well without difficulty  Support at home: good  Contraception: Mirena placed PP  [place]: baby girl is doing well, will f/u with pediatrician.         Objective:     Vital Signs (Most Recent):  Temp: 97.9 °F (36.6 °C) (10/07/24 0817)  Pulse: 75 (10/07/24 0817)  Resp: 18 (10/07/24 0817)  BP: (!) 107/59 (10/07/24 0817)  SpO2: 99 % (10/07/24 0817) Vital Signs (24h Range):  Temp:  [97.1 °F (36.2 °C)-98.6 °F (37 °C)] 97.9 °F (36.6 °C)  Pulse:  [64-87] 75  Resp:  [16-18] 18  SpO2:  [96 %-100 %] 99 %  BP: (106-122)/(57-63) 107/59     Weight: 69 kg (152 lb 1.9 oz)  Body mass index is 26.11 kg/m².      Intake/Output Summary (Last 24 hours) at 10/7/2024 0936  Last data filed at 10/7/2024 0305  Gross per 24 hour   Intake 1032.29 ml   Output 3950 ml   Net -2917.71 ml         Significant Labs:  Lab Results   Component Value Date    GROUPTRH B POS 10/06/2024    HEPBSAG Non-reactive 2024    STREPBCULT No Group B Streptococcus isolated 2019     Recent Labs   Lab 10/07/24  0621   HGB 10.2*   HCT 31.9*       I have personallly reviewed all pertinent lab results from the last 24 hours.    Physical  Exam:   Constitutional: She is oriented to person, place, and time. She appears well-developed and well-nourished.    HENT:   Head: Normocephalic.    Eyes: Conjunctivae are normal.     Cardiovascular:  Normal rate.             Pulmonary/Chest: No respiratory distress.        Abdominal: Soft. There is no abdominal tenderness.     Genitourinary:    Vagina and uterus normal.             Musculoskeletal: Normal range of motion.       Neurological: She is alert and oriented to person, place, and time.    Skin: Skin is warm and dry.    Psychiatric: She has a normal mood and affect. Her behavior is normal. Judgment and thought content normal.     Breasts: bilaterally soft, non-tender, nipples intact   Abdomen: soft, non-tender, uterus firm at U - 1 fb  Perineum: approximated, no edema   Lochia: minimal rubra  Ext: bilaterally minimal pedal edema, without signs of DVT     Review of Systems   Constitutional:  Negative for chills and fever.   Eyes:  Negative for visual disturbance.   Respiratory:  Negative for cough.    Cardiovascular:  Negative for chest pain.   Gastrointestinal:  Negative for nausea and vomiting.   Genitourinary:  Positive for vaginal bleeding.   Neurological:  Negative for seizures and headaches.   All other systems reviewed and are negative.    Assessment/Plan:     39 y.o. female  for:    *  (spontaneous vaginal delivery)  Routine PP care    Encounter for initial prescription of contraceptives- desires IUD at [facility] [place] placed PP in hospital    History of depression  Plan for mood check 2 weeks PP        Disposition: As patient meets milestones, will plan to discharge tomorrow.    Shantell Peres CNM  Obstetrics  [facility]

## 2024-10-07 NOTE — LACTATION NOTE
10/07/24 1620   Maternal Infant Feeding   Latch Assistance no   Equipment Type   Breast Pump Type double electric, personal  (at home)   Community Referrals   Community Referrals support group;pediatric care provider;outpatient lactation program     Basic lactation education reviewed. Patient reports baby nursing well on both breasts, no questions or concerns. Encouraged to call for latch check.  number on board.

## 2024-10-07 NOTE — HOSPITAL COURSE
10/7/24: PPD1, doing well, breast feeding, normal involution, routine PP care, plan to d/c tomorrow.  10/8/24 PPD2, doing well, planning discharge home today, still breastfeeding.

## 2024-10-07 NOTE — LACTATION NOTE
LC number on board to call at next feeding for assessment. LC spoke with partner at bedside while patient in shower, to have patient call LC for next feeding.

## 2024-10-07 NOTE — SUBJECTIVE & OBJECTIVE
Interval History: PPD1    Doing well, ambulating, voiding, and tolerating regular diet  Denies dizziness or light headed sensation. Denies SOB or difficulty breathing.   Denies headaches, visual disturbances or upper GI pain, nausea, or vomiting.  Reports passing flatus.   Lochia: steadily decreasing  Pain: well controlled with Ibuprofen  Breasts/nipples: breast feeding well without difficulty  Support at home: good  Contraception: Mirena placed PP  [place]: baby girl is doing well, will f/u with pediatrician.         Objective:     Vital Signs (Most Recent):  Temp: 97.9 °F (36.6 °C) (10/07/24 0817)  Pulse: 75 (10/07/24 0817)  Resp: 18 (10/07/24 0817)  BP: (!) 107/59 (10/07/24 0817)  SpO2: 99 % (10/07/24 0817) Vital Signs (24h Range):  Temp:  [97.1 °F (36.2 °C)-98.6 °F (37 °C)] 97.9 °F (36.6 °C)  Pulse:  [64-87] 75  Resp:  [16-18] 18  SpO2:  [96 %-100 %] 99 %  BP: (106-122)/(57-63) 107/59     Weight: 69 kg (152 lb 1.9 oz)  Body mass index is 26.11 kg/m².      Intake/Output Summary (Last 24 hours) at 10/7/2024 0936  Last data filed at 10/7/2024 0305  Gross per 24 hour   Intake 1032.29 ml   Output 3950 ml   Net -2917.71 ml         Significant Labs:  Lab Results   Component Value Date    GROUPTRH B POS 10/06/2024    HEPBSAG Non-reactive 2024    STREPBCULT No Group B Streptococcus isolated 2019     Recent Labs   Lab 10/07/24  0621   HGB 10.2*   HCT 31.9*       I have personallly reviewed all pertinent lab results from the last 24 hours.    Physical Exam:   Constitutional: She is oriented to person, place, and time. She appears well-developed and well-nourished.    HENT:   Head: Normocephalic.    Eyes: Conjunctivae are normal.     Cardiovascular:  Normal rate.             Pulmonary/Chest: No respiratory distress.        Abdominal: Soft. There is no abdominal tenderness.     Genitourinary:    Vagina and uterus normal.             Musculoskeletal: Normal range of motion.       Neurological: She is alert and  oriented to person, place, and time.    Skin: Skin is warm and dry.    Psychiatric: She has a normal mood and affect. Her behavior is normal. Judgment and thought content normal.     Breasts: bilaterally soft, non-tender, nipples intact   Abdomen: soft, non-tender, uterus firm at U - 1 fb  Perineum: approximated, no edema   Lochia: minimal rubra  Ext: bilaterally minimal pedal edema, without signs of DVT     Review of Systems   Constitutional:  Negative for chills and fever.   Eyes:  Negative for visual disturbance.   Respiratory:  Negative for cough.    Cardiovascular:  Negative for chest pain.   Gastrointestinal:  Negative for nausea and vomiting.   Genitourinary:  Positive for vaginal bleeding.   Neurological:  Negative for seizures and headaches.   All other systems reviewed and are negative.

## 2024-10-07 NOTE — DISCHARGE INSTRUCTIONS
Community Resources for Breastfeeding Mothers:   Hospital Breastfeeding Centers/ Lactation Consultants:   [facility]........................................................................................[phone]  Outpatient Lactation Consults               [phone]   [facility]....................................................[phone]   [facility]..........................................................................................[phone]   [facility].................................................[phone]   [facility].......................................................................................[phone]   [facility].................................[phone]   [facility].......................................[phone]   [facility]..................................................[phone]   [facility].....................................................................[phone]      [facility]APCC (Poison Control)...........[phone]  PoisonHelp.org   Free medical advice 24/7 through the Poison Help Line and the online tool      Online Resources:   International Breastfeeding [place] ...............................................................................ibconline.ca   Dr Ap Lindsay online resource provides videos, articles, and information sheets.     Coeffective...............................................................................................................coeffective.com   Download the free mobile shiraz to help get off to a great start with breastfeeding.   Droplet.....................................................................................................................PlayData.com   Global  Health Media...........................................................................................Encelium Technologies.org   Videos that teach and empower mothers and caregivers   Infant [facility].............................................................................[phone]      Bluefly   Provides up to date information for medication use by moms during pregnancy and while breastfeeding.   Falguni Crews....................................................................................................................kellymom.com   Provides online information on breastfeeding and parenting      La Leche League........................................................................................ lllalmsla.org   /   llli.org   Mother-to-mother support groups with education, information support, and encouragement    Work and Pump........................................................................................... ShopCity.com.com   Information about breastfeeding for working moms     [place] Resources:   [place] Breastfeeding Coalition............................... 1-696-508-7769    [facility]   Find local breastfeeding support   [place] Breastfeeding Support............................................................ LaBreastfeedingSport.org   Zip code search of breastfeeding resources in your area   Partners for Healthy Babies............................................................[phone]   8736091pxzc.org   Connects [place] moms and their families to health and pregnancy resources.  24/7   WIC (Women, Infant, Children)......................................................... [phone]   ldh.la.gov/WIC   Download the LED Light Sense shiraz, get code from WIC office    [facility] Resources:     Baby Cafe............................................................................................................. babycafeusa.org   Free, drop in, informal  breastfeeding support groups offering professional lactation care and intervention.    [facility] [place] [facility]....................................... birthmarkMOBITRAC.com   Infant feeding drop in clinics, Lactation services, support groups, education programs   Cafe [facility]...............................................[phone]   Topsy Labs.com/groups/[facility]   Free breastfeeding support group for families of color   Mothers Milk Bank [facility]....................................................[phone]                                                             CopioussGroup Commerce.iConclude/services/mothers-milk-bank-[facility]   [facility] Nesting..................................................................................[phone] nolanesting.com   In person and virtual support for families through pregnancy, birth, and early parenthood.       [facility] [place]- Dr. Yani Craven.......................[phone]   [address] [place] [address]                                  www.advancedbreastfeeding.com   [email or web address]   JetPay Lactation Care, [facility] (Denise Garibay RN, IBCLC) ............................[phone] [email or web address].[employer] Thermal Power [email or web address] [email or web address]    Healthy Start [place].....................................[phone] ([place])  [phone] ([place])   [facility].gov/health-department/healthy-start   Serves women of childbearing age and addresses issues for pregnant women and their children from birth  to age two.          La Leche League- [place]............................. Merchant Atlas.American Thermal Power/ Topsy Labs.American Thermal Power/ Formula XOalicia   In person and virtual mother to mother support groups with education, information support and   encouragement to women who want to breastfeed      [place] Resources:   Breastfeeding Resources- [facility]  Health.....msdh.ms.gov (under womens services)   Find resources and info about planning for breastfeeding, its benefits, and help with breastfeeding  s uccessfully.    [facility] [place]....................................... dineout   [phone]   [address] [place], [place]. Call or text 24/7   [facility].......................................................Usetraceastbreastfeedingcenter.Taplet   [facility] Lactation Consultants [facility], including [facility],   [facility], and surrounding areas.    [place] Breastfeeding Coalition...............................................................................msbfc.org   Promotes and supports breastfeeding with families, health providers, and communities.   [facility] breastfeeding Coalition.....................................................................smbfc.org   Find breastfeeding resources and support groups in your area.    WIC Nutrition Program- [facility] of Health.................................... ms.gov

## 2024-10-08 VITALS
DIASTOLIC BLOOD PRESSURE: 57 MMHG | RESPIRATION RATE: 18 BRPM | WEIGHT: 152.13 LBS | HEART RATE: 73 BPM | HEIGHT: 64 IN | OXYGEN SATURATION: 97 % | TEMPERATURE: 99 F | BODY MASS INDEX: 25.97 KG/M2 | SYSTOLIC BLOOD PRESSURE: 104 MMHG

## 2024-10-08 PROCEDURE — 25000003 PHARM REV CODE 250

## 2024-10-08 PROCEDURE — 99238 HOSP IP/OBS DSCHRG MGMT 30/<: CPT | Mod: ,,, | Performed by: ADVANCED PRACTICE MIDWIFE

## 2024-10-08 PROCEDURE — 25000003 PHARM REV CODE 250: Performed by: STUDENT IN AN ORGANIZED HEALTH CARE EDUCATION/TRAINING PROGRAM

## 2024-10-08 RX ADMIN — ACETAMINOPHEN 650 MG: 325 TABLET, FILM COATED ORAL at 10:10

## 2024-10-08 RX ADMIN — DOCUSATE SODIUM 200 MG: 100 CAPSULE, LIQUID FILLED ORAL at 10:10

## 2024-10-08 RX ADMIN — IBUPROFEN 600 MG: 600 TABLET, FILM COATED ORAL at 03:10

## 2024-10-08 RX ADMIN — ACETAMINOPHEN 650 MG: 325 TABLET, FILM COATED ORAL at 03:10

## 2024-10-08 RX ADMIN — PRENATAL VIT W/ FE FUMARATE-FA TAB 27-0.8 MG 1 TABLET: 27-0.8 TAB at 10:10

## 2024-10-08 RX ADMIN — FERROUS SULFATE TAB 325 MG (65 MG ELEMENTAL FE) 1 EACH: 325 (65 FE) TAB at 10:10

## 2024-10-08 RX ADMIN — IBUPROFEN 600 MG: 600 TABLET, FILM COATED ORAL at 10:10

## 2024-10-08 NOTE — DISCHARGE SUMMARY
"[facility] - [facility] ([place])  Obstetrics  Discharge Summary      Patient Name: Katey Cedillo  MRN: [id number]  Admission Date: 10/6/2024  Hospital Length of Stay: 2 days  Discharge Date and Time:  10/08/2024 8:34 AM  Attending Physician: Mady Peter MD   Discharging Provider: Vianney Vernon CNM   Primary Care Provider: Ya Giles MD    HPI: Katey Cedillo is 39 y.o.  at 40w3d wga presenting for normal labor.         * No surgery found *     Hospital Course:   10/7/24: PPD1, doing well, breast feeding, normal involution, routine PP care, plan to d/c tomorrow.  10/8/24 PPD2, doing well, planning discharge home today, still breastfeeding.          Final Active Diagnoses:    Diagnosis Date Noted POA    PRINCIPAL PROBLEM:   (spontaneous vaginal delivery) [O80] 10/06/2024 Not Applicable    Encounter for initial prescription of contraceptives- desires IUD at [facility] [Z30.019] 2024 Yes    History of depression [Z86.59] 2019 Not Applicable      Problems Resolved During this Admission:    Diagnosis Date Noted Date Resolved POA    Normal labor [O80, Z37.9] 10/06/2024 10/06/2024 Not Applicable        Significant Diagnostic Studies: Labs: CBC   Recent Labs   Lab 10/07/24  0621   WBC 14.96*   HGB 10.2*   HCT 31.9*            Feeding Method: breast    Immunizations       Date Immunization Status Dose Route/Site Given by    10/06/24 175 MMR Incomplete 0.5 mL Subcutaneous/     10/06/24 175 Tdap Incomplete 0.5 mL Intramuscular/             Delivery:    Episiotomy: None   Lacerations: None   Repair suture: None   Repair # of packets:     Blood loss (ml):       Birth information:  YOB: 2022   Time of birth: 4:46 PM   Sex: female   Delivery type: Vaginal, Spontaneous   Gestational Age: 40w3d     Measurements    Weight: 3130 g  Weight (lbs): 6 lb 14.4 oz  Length: 49.5 cm  Length (in): 19.5"  Head circumference: 33 cm  Chest circumference: 35.6 cm         Delivery " Clinician: Delivery Providers    Delivering clinician: Radha Brown MD   Provider Role    Ezio Cordero RN Crouch, Callie R., ST Carper, Elizabeth A, RN              Additional  information:  Forceps:    Vacuum:    Breech:    Observed anomalies      Living?:     Apgars    Living status: Living  Apgar Component Scores:  1 min.:  5 min.:  10 min.:  15 min.:  20 min.:    Skin color:  1  1       Heart rate:  2  2       Reflex irritability:  2  2       Muscle tone:  2  2       Respiratory effort:  2  2       Total:  9  9       Apgars assigned by: EMORY ASTORGA RN         Placenta: Delivered:       appearance  Pending Diagnostic Studies:       None            Discharged Condition: stable    Disposition: Home or Self Care    Follow Up:   Follow-up Information       Mady Peter MD Follow up in 6 week(s).    Specialty: Obstetrics and Gynecology  Why: 6 we post partum visit and IUD string check  Contact information:  [address] [facility] [address] [phone]               Mady Peter MD. Schedule an appointment as soon as possible for a visit in 2 week(s).    Specialty: Obstetrics and Gynecology  Why: Mood Check (virtual visit acceptable, if desired)  Contact information:  [address] [address] [phone]                           Patient Instructions:      Diet Adult Regular     Diet Adult Regular     No driving until:   Order Comments: - Not taking narcotic pain medications  - You feel that you can safely slam on the brakes     Pelvic Rest   Order Comments: Nothing in vagina until evaluation at postpartum visit (no sex, tampons, or douching)     Notify your health care provider if you experience any of the following:  temperature >100.4     Notify your health care provider if you experience any of the following:  persistent nausea and vomiting or diarrhea     Notify your health care provider if you experience any of the following:  severe  uncontrolled pain     Notify your health care provider if you experience any of the following:  redness, tenderness, or signs of infection (pain, swelling, redness, odor or green/yellow discharge around incision site)     Notify your health care provider if you experience any of the following:  difficulty breathing or increased cough     Notify your health care provider if you experience any of the following:  severe persistent headache     Notify your health care provider if you experience any of the following:  worsening rash     Notify your health care provider if you experience any of the following:  persistent dizziness, light-headedness, or visual disturbances     Notify your health care provider if you experience any of the following:  increased confusion or weakness     Notify your health care provider if you experience any of the following:   Order Comments: - Heavy vaginal bleeding soaking through more than 2 pads/hour for > 2 hours  - Severe abdominal pain  - Headache not relieved with Tylenol  - Vision changes  - Chest pain or shortness or breath     Ice to affected area     Lifting restrictions     Pelvic Rest     Notify your health care provider if you experience any of the following:  temperature >100.4     Notify your health care provider if you experience any of the following:  persistent nausea and vomiting or diarrhea     Notify your health care provider if you experience any of the following:  severe uncontrolled pain     Notify your health care provider if you experience any of the following:  redness, tenderness, or signs of infection (pain, swelling, redness, odor or green/yellow discharge around incision site)     Notify your health care provider if you experience any of the following:  difficulty breathing or increased cough     Notify your health care provider if you experience any of the following:  severe persistent headache     Notify your health care provider if you experience any of  the following:  worsening rash     Notify your health care provider if you experience any of the following:  persistent dizziness, light-headedness, or visual disturbances     Notify your health care provider if you experience any of the following:  increased confusion or weakness     Activity as tolerated     Medications:  Current Discharge Medication List        START taking these medications    Details   acetaminophen (TYLENOL) 325 MG tablet Take 2 tablets (650 mg total) by mouth every 6 (six) hours. Alternate between ibuprofen and tylenol every 3 hours. For example: @0800: ibuprofen 600mg @1100: tylenol 650mg @1400: ibuprofen 600mg @1700: tylenol 650 mg @2000: ibuprofen 600mg  Qty: 30 tablet, Refills: 1      docusate sodium (COLACE) 100 MG capsule Take 2 capsules (200 mg total) by mouth 2 (two) times daily.  Qty: 60 capsule, Refills: 0      ibuprofen (ADVIL,MOTRIN) 600 MG tablet Take 1 tablet (600 mg total) by mouth every 6 (six) hours. Alternate between ibuprofen and tylenol every 3 hours. For example: @0800: ibuprofen 600mg @1100: tylenol 650mg @1400: ibuprofen 600mg @1700: tylenol 650 mg @2000: ibuprofen 600mg  Qty: 30 tablet, Refills: 1           CONTINUE these medications which have NOT CHANGED    Details   ferrous sulfate 325 (65 FE) MG EC tablet Take 1 tablet (325 mg total) by mouth once daily.  Qty: 30 tablet, Refills: 5    Associated Diagnoses: Anemia during pregnancy in second trimester           STOP taking these medications       ondansetron (ZOFRAN-ODT) 8 MG TbDL Comments:   Reason for Stopping:         promethazine (PHENERGAN) 25 MG tablet Comments:   Reason for Stopping:           Follow Up/Patient Instructions:   1. Reviewed postpartum recommendations and precautions ~ encouraged to call for fever, severe or increased pain in head, chest, abdomen, perineum or legs; heavy bleeding, foul smelling lochia, signs of depression, or any other concern. Reviewed postpartum precautions r/t high blood  pressure ~ encouraged to call for HA, vision changes, epigastric discomfort, or abnormal swelling.  2. Rx provided: Ibuprofen, tylenol, stool softner  3. Encouraged abstinence and pelvic rest for healing until after postpartum check-up.   4. Encouraged to continue PNV while breastfeeding or at least for 6 weeks postpartum.   5. Car seat law will be reviewed with patient at discharge per protocol  6. RTO in 4-6 weeks or sooner prn.  7. Discharge home today with written and verbal postpartum instructions and precautions.      MIKAYLA Hair CNM  Obstetrics  Scientologist - [facility] Baby ([place])

## 2024-10-08 NOTE — PROGRESS NOTES
[facility])  Obstetrics  Postpartum Progress Note    Patient Name: Katey Cedillo  MRN: [id number]  Admission Date: 10/6/2024  Hospital Length of Stay: 2 days  Attending Physician: Mady Peter MD  Primary Care Provider: Ya Giles MD    Subjective:     Principal Problem: (spontaneous vaginal delivery)    Hospital Course:  10/7/24: PPD1, doing well, breast feeding, normal involution, routine PP care, plan to d/c tomorrow.  10/8/24 PPD2, doing well, planning discharge home today, still breastfeeding.     Interval History:   Doing well, ambulating, voiding, and tolerating regular diet  Denies dizziness or light headed sensation. Denies SOB or difficulty breathing.   Denies headaches, visual disturbances or upper GI pain, nausea, or vomiting.  Reports passing flatus.   Lochia: steadily decreasing  Pain: well controlled  requiring PO  medication  Breasts/nipples: breast feeding well without difficulty; saw lactation  Depression/anxiety: denies currently    Support at home: good  Contraception: Post placental Mirena IUD placed  : Infant daughter is doing well, will f/u with pediatrician.     Objective:     Vital Signs (Most Recent):  Temp: 98.5 °F (36.9 °C) (10/08/24 0755)  Pulse: 73 (10/08/24 0755)  Resp: 18 (10/08/24 075)  BP: (!) 104/57 (10/08/24 0755)  SpO2: 97 % (10/08/24 0755) Vital Signs (24h Range):  Temp:  [98.1 °F (36.7 °C)-98.5 °F (36.9 °C)] 98.5 °F (36.9 °C)  Pulse:  [69-78] 73  Resp:  [18] 18  SpO2:  [97 %-99 %] 97 %  BP: (104-112)/(57-65) 104/57     Weight: 69 kg (152 lb 1.9 oz)  Body mass index is 26.11 kg/m².    No intake or output data in the 24 hours ending 10/08/24 0825      Significant Labs:  Lab Results   Component Value Date    GROUPTRH B POS 10/06/2024    HEPBSAG Non-reactive 2024    STREPBCULT No Group B Streptococcus isolated 2019         CBC:   Recent Labs   Lab 10/07/24  0621   WBC 14.96*   RBC 3.57*   HGB 10.2*   HCT 31.9*       MCV 89   MCH 28.6   MCHC 32.0     I have personallly reviewed all pertinent lab results from the last 24 hours.    Physical Exam  Gen: A&O x 4, NAD  CV: normal HR  Lungs: normal resp effort  Breasts: bilaterally soft, non-tender, nipples intact bilaterally   Abdomen: soft, non-tender, uterus firm at Umbilicus  Perineum: approximated, no edema   Lochia: minimal rubra  Ext: bilaterally without pedal edema, without signs of DVT     Assessment/Plan:     39 y.o. female  for:    *  (spontaneous vaginal delivery)  PPD2, Normal involution, continue routine PP care and advances   Discharge education completed      Encounter for initial prescription of contraceptives- desires IUD at [facility] placed PP in hospital    History of depression  Plan for mood check 2 weeks PP        Disposition: As patient meets milestones, will plan to discharge 10/8/24.    MIKAYLA Hair CNM  Obstetrics  Worship - [facility] ([place])

## 2024-10-08 NOTE — LACTATION NOTE
Discharge lactation education provided. Questions answered. Pt has lactation contact number and community resources.

## 2024-10-08 NOTE — PLAN OF CARE
POC reviewed with patient, verbalized understanding. Fundus firm and bleeding moderate. Pain well controlled with PO medications. Ambulating/voiding. PIV removed. Patient discharged per order. AVS reviewed with patient, verbalized understanding.

## 2024-10-08 NOTE — SUBJECTIVE & OBJECTIVE
Interval History:   Doing well, ambulating, voiding, and tolerating regular diet  Denies dizziness or light headed sensation. Denies SOB or difficulty breathing.   Denies headaches, visual disturbances or upper GI pain, nausea, or vomiting.  Reports passing flatus.   Lochia: steadily decreasing  Pain: well controlled  requiring PO  medication  Breasts/nipples: breast feeding well without difficulty; saw lactation  Depression/anxiety: denies currently    Support at home: good  Contraception: Post placental Mirena IUD placed  : Infant daughter is doing well, will f/u with pediatrician.     Objective:     Vital Signs (Most Recent):  Temp: 98.5 °F (36.9 °C) (10/08/24 0755)  Pulse: 73 (10/08/24 0755)  Resp: 18 (10/08/24 0755)  BP: (!) 104/57 (10/08/24 0755)  SpO2: 97 % (10/08/24 0755) Vital Signs (24h Range):  Temp:  [98.1 °F (36.7 °C)-98.5 °F (36.9 °C)] 98.5 °F (36.9 °C)  Pulse:  [69-78] 73  Resp:  [18] 18  SpO2:  [97 %-99 %] 97 %  BP: (104-112)/(57-65) 104/57     Weight: 69 kg (152 lb 1.9 oz)  Body mass index is 26.11 kg/m².    No intake or output data in the 24 hours ending 10/08/24 0825      Significant Labs:  Lab Results   Component Value Date    GROUPTRH B POS 10/06/2024    HEPBSAG Non-reactive 2024    STREPBCULT No Group B Streptococcus isolated 2019         CBC:   Recent Labs   Lab 10/07/24  0621   WBC 14.96*   RBC 3.57*   HGB 10.2*   HCT 31.9*      MCV 89   MCH 28.6   MCHC 32.0     I have personallly reviewed all pertinent lab results from the last 24 hours.    Physical Exam  Gen: A&O x 4, NAD  CV: normal HR  Lungs: normal resp effort  Breasts: bilaterally soft, non-tender, nipples intact bilaterally   Abdomen: soft, non-tender, uterus firm at Umbilicus  Perineum: approximated, no edema   Lochia: minimal rubra  Ext: bilaterally without pedal edema, without signs of DVT

## 2024-10-08 NOTE — PLAN OF CARE
Pt ambulating and voiding without difficulty. Patient safety maintained, side rails up, bed low and locked position.  Pain well controlled with PRN pain medication. Fundus midline, firm, with light lochia. VSS. Mother at bedside; parent responding to infant cues and bonding appropriately.

## 2024-10-09 ENCOUNTER — PATIENT MESSAGE (OUTPATIENT)
Dept: OBSTETRICS AND GYNECOLOGY | Facility: OTHER | Age: 39
End: 2024-10-09
Payer: MEDICAID

## 2024-10-18 ENCOUNTER — HOSPITAL ENCOUNTER (EMERGENCY)
Facility: OTHER | Age: 39
Discharge: HOME | End: 2024-10-18
Attending: OBSTETRICS & GYNECOLOGY
Payer: MEDICAID

## 2024-10-18 ENCOUNTER — TELEPHONE (OUTPATIENT)
Dept: OBSTETRICS AND GYNECOLOGY | Facility: OTHER | Age: 39
End: 2024-10-18
Payer: MEDICAID

## 2024-10-18 VITALS
DIASTOLIC BLOOD PRESSURE: 75 MMHG | TEMPERATURE: 98 F | RESPIRATION RATE: 12 BRPM | SYSTOLIC BLOOD PRESSURE: 125 MMHG | HEART RATE: 63 BPM | OXYGEN SATURATION: 99 %

## 2024-10-18 DIAGNOSIS — R03.0 ELEVATED BLOOD PRESSURE READING: Primary | ICD-10-CM

## 2024-10-18 DIAGNOSIS — R51.9 ACUTE NONINTRACTABLE HEADACHE, UNSPECIFIED HEADACHE TYPE: ICD-10-CM

## 2024-10-18 LAB
BILIRUB SERPL-MCNC: NORMAL MG/DL
BLOOD URINE, POC: NORMAL
COLOR, POC UA: NORMAL
GLUCOSE UR QL STRIP: NORMAL
KETONES UR QL STRIP: NORMAL
LEUKOCYTE ESTERASE URINE, POC: NORMAL
NITRITE, POC UA: NORMAL
PH, POC UA: 5
PROTEIN, POC: NORMAL
SPECIFIC GRAVITY, POC UA: 1.03
UROBILINOGEN, POC UA: NORMAL

## 2024-10-18 PROCEDURE — 25000003 PHARM REV CODE 250: Performed by: OBSTETRICS & GYNECOLOGY

## 2024-10-18 PROCEDURE — 99284 EMERGENCY DEPT VISIT MOD MDM: CPT

## 2024-10-18 PROCEDURE — 81002 URINALYSIS NONAUTO W/O SCOPE: CPT

## 2024-10-18 PROCEDURE — 99283 EMERGENCY DEPT VISIT LOW MDM: CPT | Mod: ,,, | Performed by: OBSTETRICS & GYNECOLOGY

## 2024-10-18 RX ORDER — METOCLOPRAMIDE 5 MG/1
10 TABLET ORAL ONCE
Status: COMPLETED | OUTPATIENT
Start: 2024-10-18 | End: 2024-10-18

## 2024-10-18 RX ORDER — METOCLOPRAMIDE 10 MG/1
10 TABLET ORAL EVERY 6 HOURS PRN
Qty: 10 TABLET | Refills: 0 | Status: SHIPPED | OUTPATIENT
Start: 2024-10-18

## 2024-10-18 RX ORDER — DIPHENHYDRAMINE HCL 25 MG
25 CAPSULE ORAL EVERY 6 HOURS PRN
Qty: 10 CAPSULE | Refills: 0 | Status: SHIPPED | OUTPATIENT
Start: 2024-10-18

## 2024-10-18 RX ORDER — DIPHENHYDRAMINE HCL 25 MG
25 CAPSULE ORAL ONCE
Status: COMPLETED | OUTPATIENT
Start: 2024-10-18 | End: 2024-10-18

## 2024-10-18 RX ADMIN — METOCLOPRAMIDE 10 MG: 5 TABLET ORAL at 09:10

## 2024-10-18 RX ADMIN — DIPHENHYDRAMINE HYDROCHLORIDE 25 MG: 25 CAPSULE ORAL at 09:10

## 2024-10-27 ENCOUNTER — PATIENT MESSAGE (OUTPATIENT)
Dept: OBSTETRICS AND GYNECOLOGY | Facility: CLINIC | Age: 39
End: 2024-10-27
Payer: MEDICAID

## 2024-10-31 ENCOUNTER — TELEPHONE (OUTPATIENT)
Dept: PRIMARY CARE CLINIC | Facility: CLINIC | Age: 39
End: 2024-10-31
Payer: MEDICAID

## 2024-11-05 ENCOUNTER — PATIENT MESSAGE (OUTPATIENT)
Facility: CLINIC | Age: 39
End: 2024-11-05
Payer: MEDICAID

## 2024-11-15 ENCOUNTER — POSTPARTUM VISIT (OUTPATIENT)
Dept: OBSTETRICS AND GYNECOLOGY | Facility: CLINIC | Age: 39
End: 2024-11-15
Payer: MEDICAID

## 2024-11-15 VITALS
DIASTOLIC BLOOD PRESSURE: 66 MMHG | SYSTOLIC BLOOD PRESSURE: 102 MMHG | WEIGHT: 133.38 LBS | HEIGHT: 64 IN | BODY MASS INDEX: 22.77 KG/M2

## 2024-11-15 DIAGNOSIS — Z30.431 IUD CHECK UP: Primary | ICD-10-CM

## 2024-11-15 PROBLEM — Z30.019 ENCOUNTER FOR INITIAL PRESCRIPTION OF CONTRACEPTIVES: Status: RESOLVED | Noted: 2024-09-09 | Resolved: 2024-11-15

## 2024-11-15 PROBLEM — O09.529 ANTEPARTUM MULTIGRAVIDA OF ADVANCED MATERNAL AGE: Status: RESOLVED | Noted: 2024-02-26 | Resolved: 2024-11-15

## 2024-11-15 PROBLEM — O99.019 ANEMIA AFFECTING PREGNANCY, ANTEPARTUM: Status: RESOLVED | Noted: 2024-06-18 | Resolved: 2024-11-15

## 2024-11-15 PROCEDURE — 99999 PR PBB SHADOW E&M-EST. PATIENT-LVL III: CPT | Mod: PBBFAC,,, | Performed by: ADVANCED PRACTICE MIDWIFE

## 2024-11-15 PROCEDURE — 99213 OFFICE O/P EST LOW 20 MIN: CPT | Mod: PBBFAC | Performed by: ADVANCED PRACTICE MIDWIFE

## 2024-11-15 NOTE — PROGRESS NOTES
"HISTORY OF PRESENT ILLNESS:  Katey Cedillo is a 39 y.o. female   Presents today for a  5  weeks post partum exam. Pt had an  on 10/06/24- no complications.Pt had a Mirena placed at delivery Pt is currently breastfeeding- reports no problems.   [place]- 0    Delivery Date:  10/06/24  Gender:  Female  Baby Name:  Mami  Birth Weight:  6#- now 10#  Breast Feeding: Yes  Vaginal Bleeding: None  Incontinence: No  Depression: No  [place] yet: No  Contraception- Mirena placed at delivery  Support system: Good.    PREGNANCY COMPLICATED BY:    Current Outpatient Medications on File Prior to Visit   Medication Sig Dispense Refill    acetaminophen (TYLENOL) 325 MG tablet Take 2 tablets (650 mg total) by mouth every 6 (six) hours. Alternate between ibuprofen and tylenol every 3 hours. For example: @0800: ibuprofen 600mg @1100: tylenol 650mg @1400: ibuprofen 600mg @1700: tylenol 650 mg @2000: ibuprofen 600mg 30 tablet 1    diphenhydrAMINE (BENADRYL) 25 mg capsule Take 1 capsule (25 mg total) by mouth every 6 (six) hours as needed (headache, take with 10mg reglan). 10 capsule 0    docusate sodium (COLACE) 100 MG capsule Take 2 capsules (200 mg total) by mouth 2 (two) times daily. 60 capsule 0    ferrous sulfate 325 (65 FE) MG EC tablet Take 1 tablet (325 mg total) by mouth once daily. 30 tablet 5    ibuprofen (ADVIL,MOTRIN) 600 MG tablet Take 1 tablet (600 mg total) by mouth every 6 (six) hours. Alternate between ibuprofen and tylenol every 3 hours. For example: @0800: ibuprofen 600mg @1100: tylenol 650mg @1400: ibuprofen 600mg @1700: tylenol 650 mg @2000: ibuprofen 600mg 30 tablet 1    metoclopramide HCl (REGLAN) 10 MG tablet Take 1 tablet (10 mg total) by mouth every 6 (six) hours as needed (headache, take with 25mg benadryl). 10 tablet 0     No current facility-administered medications on file prior to visit.       /66 (BP Location: Left arm, Patient Position: Sitting)   Ht 5' 4" (1.626 m)   Wt " 60.5 kg (133 lb 6.1 oz)   LMP 12/30/2023   Breastfeeding Yes   BMI 22.89 kg/m²     PE:   APPEARANCE: Well nourished, well developed, in no acute distress.   AFFECT: WNL, alert and oriented x 3.   NECK: Neck symmetric, without masses or thyromegaly.   BREASTS:  Not examined due to breast feeding.  PELVIC: External female genitalia without lesions. Female hair distribution. Adequate perineal body, Normal urethral meatus. Vagina moist and well rugated without lesions or discharge.Slight lochia- UNABLE TO VISUALIZE STRINGS No significant cystocele or rectocele present. Cervix pink without lesions, discharge or tenderness. Uterus is involuted to 4-6 week size, regular, mobile and nontender. Adnexa without masses or tenderness.   EXTREMITIES: No edema     DIAGNOSIS:   1. Normal Post Partum Exam     LABS AND TESTS ORDERED: Ultrasound for IUD check    MEDICATIONS PRESCRIBED: None      COUNSELING:   The patient was counseled today on:    -may resume usual activities p ultrasounf for IUD check  -A.C.S. Pap and pelvic exam guidelines, recomendations for yearly mammogram, monthly self breast exams and to follow up with her PCP for other health maintenance.     FOLLOW-UP for a WWE and Pap.

## 2024-11-19 ENCOUNTER — HOSPITAL ENCOUNTER (OUTPATIENT)
Dept: RADIOLOGY | Facility: OTHER | Age: 39
Discharge: HOME | End: 2024-11-19
Attending: ADVANCED PRACTICE MIDWIFE
Payer: MEDICAID

## 2024-11-19 DIAGNOSIS — Z30.431 IUD CHECK UP: ICD-10-CM

## 2024-11-19 PROCEDURE — 76856 US EXAM PELVIC COMPLETE: CPT | Mod: 26,,, | Performed by: RADIOLOGY

## 2024-11-19 PROCEDURE — 76830 TRANSVAGINAL US NON-OB: CPT | Mod: 26,,, | Performed by: RADIOLOGY

## 2024-11-19 PROCEDURE — 76830 TRANSVAGINAL US NON-OB: CPT | Mod: TC

## 2024-11-26 ENCOUNTER — OFFICE VISIT (OUTPATIENT)
Dept: PRIMARY CARE CLINIC | Facility: CLINIC | Age: 39
End: 2024-11-26
Payer: MEDICAID

## 2024-11-26 VITALS
OXYGEN SATURATION: 99 % | TEMPERATURE: 98 F | HEART RATE: 66 BPM | DIASTOLIC BLOOD PRESSURE: 70 MMHG | SYSTOLIC BLOOD PRESSURE: 111 MMHG | HEIGHT: 64 IN | WEIGHT: 133.19 LBS | BODY MASS INDEX: 22.74 KG/M2

## 2024-11-26 DIAGNOSIS — D64.9 NORMOCYTIC ANEMIA: Primary | ICD-10-CM

## 2024-11-26 DIAGNOSIS — Z00.00 ROUTINE ADULT HEALTH MAINTENANCE: ICD-10-CM

## 2024-11-26 PROCEDURE — 99213 OFFICE O/P EST LOW 20 MIN: CPT | Mod: PBBFAC,PN | Performed by: STUDENT IN AN ORGANIZED HEALTH CARE EDUCATION/TRAINING PROGRAM

## 2024-11-26 PROCEDURE — 99999 PR PBB SHADOW E&M-EST. PATIENT-LVL III: CPT | Mod: PBBFAC,,, | Performed by: STUDENT IN AN ORGANIZED HEALTH CARE EDUCATION/TRAINING PROGRAM

## 2024-11-26 NOTE — PROGRESS NOTES
"2024    Katey Cedillo  [id number]    Chief Complaint   Patient presents with    Annual Exam       HPI    This pt is known and here for annual visit. No chronic conditions.  Medications: PNV      10/06/24. New IUD at delivery. Doing well and has good support. Exclusively breastfeeding.  No concerns today.    Is staying active with car of other children  Describes diet as veggies chicken and turkey  Mood: "pretty good", no saddness  Breast feeding is going well     Negative 10 point ROS outside of HPI    Social History     Socioeconomic History    Marital status: Significant Other     Spouse name: Alvarado-partner    Number of children: 3   Occupational History    Occupation: [employer]   Tobacco Use    Smoking status: Never    Smokeless tobacco: Never   Substance and Sexual Activity    Alcohol use: Not Currently    Drug use: Never    Sexual activity: Yes     Partners: Male     Comment: Mirena 10/6/24     Social Drivers of Health     Financial Resource Strain: Low Risk  (2024)    Overall Financial Resource Strain (CARDIA)     Difficulty of Paying Living Expenses: Not very hard   Food Insecurity: No Food Insecurity (2024)    Hunger Vital Sign     Worried About Running Out of Food in the Last Year: Never true     Ran Out of Food in the Last Year: Never true   Transportation Needs: No Transportation Needs (2024)    TRANSPORTATION NEEDS     Transportation : No   Physical Activity: Insufficiently Active (2024)    Exercise Vital Sign     Days of Exercise per Week: 2 days     Minutes of Exercise per Session: 30 min   Stress: No Stress Concern Present (2024)    [employer] - Occupational Stress Questionnaire     Feeling of Stress : Not at all   Housing Stability: Low Risk  (2024)    Housing Stability Vital Sign     Unable to Pay for Housing in the Last Year: No     Homeless in the Last Year: No           Current Outpatient Medications:     acetaminophen (TYLENOL) " 325 MG tablet, Take 2 tablets (650 mg total) by mouth every 6 (six) hours. Alternate between ibuprofen and tylenol every 3 hours. For example: @0800: ibuprofen 600mg @1100: tylenol 650mg @1400: ibuprofen 600mg @1700: tylenol 650 mg @2000: ibuprofen 600mg (Patient not taking: Reported on 11/26/2024), Disp: 30 tablet, Rfl: 1    diphenhydrAMINE (BENADRYL) 25 mg capsule, Take 1 capsule (25 mg total) by mouth every 6 (six) hours as needed (headache, take with 10mg reglan). (Patient not taking: Reported on 11/26/2024), Disp: 10 capsule, Rfl: 0    docusate sodium (COLACE) 100 MG capsule, Take 2 capsules (200 mg total) by mouth 2 (two) times daily. (Patient not taking: Reported on 11/26/2024), Disp: 60 capsule, Rfl: 0    ferrous sulfate 325 (65 FE) MG EC tablet, Take 1 tablet (325 mg total) by mouth once daily. (Patient not taking: Reported on 11/26/2024), Disp: 30 tablet, Rfl: 5    ibuprofen (ADVIL,MOTRIN) 600 MG tablet, Take 1 tablet (600 mg total) by mouth every 6 (six) hours. Alternate between ibuprofen and tylenol every 3 hours. For example: @0800: ibuprofen 600mg @1100: tylenol 650mg @1400: ibuprofen 600mg @1700: tylenol 650 mg @2000: ibuprofen 600mg (Patient not taking: Reported on 11/26/2024), Disp: 30 tablet, Rfl: 1    metoclopramide HCl (REGLAN) 10 MG tablet, Take 1 tablet (10 mg total) by mouth every 6 (six) hours as needed (headache, take with 25mg benadryl). (Patient not taking: Reported on 11/26/2024), Disp: 10 tablet, Rfl: 0      Physical Exam  Vitals:    11/26/24 1313   BP: 111/70   Pulse: 66   Temp: 97.7 °F (36.5 °C)       Physical Exam      Gen: well appearing, NAD  Resp: non labored breathing, no crackles, no wheezes, CTAB  CV: RRR no murmur, gallops, rubs, no LE edema  Abd: soft nontender BS present no organomegaly    1. Normocytic anemia  Last hgb 10 >one month ago  - Ferritin; Future  - Iron and TIBC; Future  - CBC Auto Differential; Future    2. Routine adult health maintenance      RTC in one year or  sooner as needed    SANTOS Fagan    I hereby acknowledge that I am relying upon documentation authored by a medical student working under my supervision and further I hereby attest that I have verified the student documentation or findings by personally performing the physical exam and medical decision making activities of the Evaluation and Management service to be billed.    Ya Giles MD

## 2024-11-27 ENCOUNTER — TELEPHONE (OUTPATIENT)
Dept: OBSTETRICS AND GYNECOLOGY | Facility: CLINIC | Age: 39
End: 2024-11-27
Payer: MEDICAID

## 2024-11-27 NOTE — TELEPHONE ENCOUNTER
Spoke to pt per phone- discussed radiology report of IUD position and recommendation to remove and replace. Pt agrees- will call her to schedule appt after holiday.

## 2024-12-02 ENCOUNTER — LAB VISIT (OUTPATIENT)
Dept: LAB | Facility: OTHER | Age: 39
End: 2024-12-02
Attending: STUDENT IN AN ORGANIZED HEALTH CARE EDUCATION/TRAINING PROGRAM
Payer: MEDICAID

## 2024-12-02 DIAGNOSIS — D64.9 NORMOCYTIC ANEMIA: ICD-10-CM

## 2024-12-02 LAB
BASOPHILS # BLD AUTO: 0.07 K/UL (ref 0–0.2)
BASOPHILS NFR BLD: 1 % (ref 0–1.9)
DIFFERENTIAL METHOD BLD: ABNORMAL
EOSINOPHIL # BLD AUTO: 0.2 K/UL (ref 0–0.5)
EOSINOPHIL NFR BLD: 2.4 % (ref 0–8)
ERYTHROCYTE [DISTWIDTH] IN BLOOD BY AUTOMATED COUNT: 16 % (ref 11.5–14.5)
FERRITIN SERPL-MCNC: 75 NG/ML (ref 20–300)
HCT VFR BLD AUTO: 38.8 % (ref 37–48.5)
HGB BLD-MCNC: 12.8 G/DL (ref 12–16)
IMM GRANULOCYTES # BLD AUTO: 0.02 K/UL (ref 0–0.04)
IMM GRANULOCYTES NFR BLD AUTO: 0.3 % (ref 0–0.5)
IRON SERPL-MCNC: 113 UG/DL (ref 30–160)
LYMPHOCYTES # BLD AUTO: 2.5 K/UL (ref 1–4.8)
LYMPHOCYTES NFR BLD: 35.9 % (ref 18–48)
MCH RBC QN AUTO: 28.8 PG (ref 27–31)
MCHC RBC AUTO-ENTMCNC: 33 G/DL (ref 32–36)
MCV RBC AUTO: 87 FL (ref 82–98)
MONOCYTES # BLD AUTO: 0.4 K/UL (ref 0.3–1)
MONOCYTES NFR BLD: 5.5 % (ref 4–15)
NEUTROPHILS # BLD AUTO: 3.9 K/UL (ref 1.8–7.7)
NEUTROPHILS NFR BLD: 54.9 % (ref 38–73)
NRBC BLD-RTO: 0 /100 WBC
PLATELET # BLD AUTO: 272 K/UL (ref 150–450)
PMV BLD AUTO: 9.8 FL (ref 9.2–12.9)
RBC # BLD AUTO: 4.44 M/UL (ref 4–5.4)
SATURATED IRON: 31 % (ref 20–50)
TOTAL IRON BINDING CAPACITY: 364 UG/DL (ref 250–450)
TRANSFERRIN SERPL-MCNC: 246 MG/DL (ref 200–375)
WBC # BLD AUTO: 7.07 K/UL (ref 3.9–12.7)

## 2024-12-02 PROCEDURE — 83540 ASSAY OF IRON: CPT | Performed by: STUDENT IN AN ORGANIZED HEALTH CARE EDUCATION/TRAINING PROGRAM

## 2024-12-02 PROCEDURE — 36415 COLL VENOUS BLD VENIPUNCTURE: CPT | Performed by: STUDENT IN AN ORGANIZED HEALTH CARE EDUCATION/TRAINING PROGRAM

## 2024-12-02 PROCEDURE — 82728 ASSAY OF FERRITIN: CPT | Performed by: STUDENT IN AN ORGANIZED HEALTH CARE EDUCATION/TRAINING PROGRAM

## 2024-12-02 PROCEDURE — 85025 COMPLETE CBC W/AUTO DIFF WBC: CPT | Performed by: STUDENT IN AN ORGANIZED HEALTH CARE EDUCATION/TRAINING PROGRAM

## 2024-12-03 ENCOUNTER — TELEPHONE (OUTPATIENT)
Dept: OBSTETRICS AND GYNECOLOGY | Facility: CLINIC | Age: 39
End: 2024-12-03
Payer: MEDICAID

## 2024-12-03 NOTE — TELEPHONE ENCOUNTER
Spoke to Katey per phone- discussed malpositioned IUD and option to keep it or have it changed out- pt desirezs to have a new IUD placed.

## 2024-12-04 ENCOUNTER — TELEPHONE (OUTPATIENT)
Dept: OBSTETRICS AND GYNECOLOGY | Facility: CLINIC | Age: 39
End: 2024-12-04
Payer: MEDICAID

## 2024-12-04 DIAGNOSIS — T83.32XA MALPOSITIONED INTRAUTERINE DEVICE (IUD), INITIAL ENCOUNTER: ICD-10-CM

## 2024-12-04 DIAGNOSIS — Z30.9 ENCOUNTER FOR CONTRACEPTIVE MANAGEMENT, UNSPECIFIED TYPE: Primary | ICD-10-CM

## 2025-01-07 ENCOUNTER — TELEPHONE (OUTPATIENT)
Dept: OBSTETRICS AND GYNECOLOGY | Facility: CLINIC | Age: 40
End: 2025-01-07
Payer: MEDICAID

## 2025-01-07 DIAGNOSIS — Z30.49 ENCOUNTER FOR SURVEILLANCE OF OTHER CONTRACEPTIVE: Primary | ICD-10-CM

## 2025-02-06 ENCOUNTER — PROCEDURE VISIT (OUTPATIENT)
Dept: OBSTETRICS AND GYNECOLOGY | Facility: CLINIC | Age: 40
End: 2025-02-06
Payer: MEDICAID

## 2025-02-06 VITALS
BODY MASS INDEX: 21.83 KG/M2 | SYSTOLIC BLOOD PRESSURE: 114 MMHG | DIASTOLIC BLOOD PRESSURE: 70 MMHG | HEIGHT: 64 IN | WEIGHT: 127.88 LBS

## 2025-02-06 DIAGNOSIS — Z30.430 ENCOUNTER FOR IUD INSERTION: ICD-10-CM

## 2025-02-06 DIAGNOSIS — Z30.433 ENCOUNTER FOR IUD REMOVAL AND REINSERTION: Primary | ICD-10-CM

## 2025-02-06 LAB
B-HCG UR QL: NEGATIVE
CTP QC/QA: YES

## 2025-02-06 PROCEDURE — 99999PBSHW PR PBB SHADOW TECHNICAL ONLY FILED TO HB: Mod: PBBFAC,,,

## 2025-02-06 PROCEDURE — 81025 URINE PREGNANCY TEST: CPT | Mod: PBBFAC | Performed by: OBSTETRICS & GYNECOLOGY

## 2025-02-06 PROCEDURE — 99999PBSHW POCT URINE PREGNANCY: Mod: PBBFAC,,,

## 2025-02-06 PROCEDURE — 58300 INSERT INTRAUTERINE DEVICE: CPT | Mod: PBBFAC | Performed by: OBSTETRICS & GYNECOLOGY

## 2025-02-06 PROCEDURE — 58301 REMOVE INTRAUTERINE DEVICE: CPT | Mod: S$PBB,,, | Performed by: OBSTETRICS & GYNECOLOGY

## 2025-02-06 RX ADMIN — LEVONORGESTREL 1 INTRA UTERINE DEVICE: 52 INTRAUTERINE DEVICE INTRAUTERINE at 10:02

## 2025-02-06 NOTE — PROCEDURES
Removal and Insertion of Intrauterine Device    Date/Time: 2/6/2025 10:15 AM    Performed by: Mady Peter MD  Authorized by: Mady Peter MD    Consent:     Consent obtained:  Prior to procedure the appropriate consent was completed and verified    Consent given by:  Patient    Procedure risks and benefits discussed: yes      Patient questions answered: yes      Patient agrees, verbalizes understanding, and wants to proceed: yes     Device to be inserted was verified by patient: yes  Removal Procedure:    Removal mechanism: Alligator.   Removed with no complications: IUD removal not due to complications   Removal due to mechanical complications: yes (malpositioned, missing strings)    Insertion Procedure:   1 Intra Uterine Device levonorgestreL 52 mg       Pelvic exam performed: yes      Negative serum pregnancy test: yes      Cervix cleaned and prepped: yes      Speculum placed in vagina: yes      Tenaculum applied to cervix: yes      Uterus sounded: yes      Uterus sound depth (cm):  7    IUD inserted with no complications: yes      Strings trimmed: yes (2cm)    Post-procedure:     Patient tolerated procedure well: yes      Patient will follow up after next period: yes

## 2025-04-02 ENCOUNTER — OFFICE VISIT (OUTPATIENT)
Dept: OBSTETRICS AND GYNECOLOGY | Facility: CLINIC | Age: 40
End: 2025-04-02
Attending: OBSTETRICS & GYNECOLOGY
Payer: MEDICAID

## 2025-04-02 VITALS
SYSTOLIC BLOOD PRESSURE: 102 MMHG | BODY MASS INDEX: 22.4 KG/M2 | WEIGHT: 131.19 LBS | HEIGHT: 64 IN | DIASTOLIC BLOOD PRESSURE: 60 MMHG

## 2025-04-02 DIAGNOSIS — T83.32XA MALPOSITIONED INTRAUTERINE DEVICE (IUD), INITIAL ENCOUNTER: ICD-10-CM

## 2025-04-02 DIAGNOSIS — Z30.431 FAMILY PLANNING, IUD (INTRAUTERINE DEVICE) CHECK/REINSERTION/REMOVAL: Primary | ICD-10-CM

## 2025-04-02 PROBLEM — Z30.430 ENCOUNTER FOR IUD INSERTION: Status: RESOLVED | Noted: 2025-02-06 | Resolved: 2025-04-02

## 2025-04-02 LAB
B-HCG UR QL: NEGATIVE
CTP QC/QA: YES

## 2025-04-02 PROCEDURE — 3078F DIAST BP <80 MM HG: CPT | Mod: CPTII,,, | Performed by: OBSTETRICS & GYNECOLOGY

## 2025-04-02 PROCEDURE — 99213 OFFICE O/P EST LOW 20 MIN: CPT | Mod: PBBFAC | Performed by: OBSTETRICS & GYNECOLOGY

## 2025-04-02 PROCEDURE — 99213 OFFICE O/P EST LOW 20 MIN: CPT | Mod: S$PBB,,, | Performed by: OBSTETRICS & GYNECOLOGY

## 2025-04-02 PROCEDURE — 81025 URINE PREGNANCY TEST: CPT | Mod: PBBFAC | Performed by: OBSTETRICS & GYNECOLOGY

## 2025-04-02 PROCEDURE — 3008F BODY MASS INDEX DOCD: CPT | Mod: CPTII,,, | Performed by: OBSTETRICS & GYNECOLOGY

## 2025-04-02 PROCEDURE — 1160F RVW MEDS BY RX/DR IN RCRD: CPT | Mod: CPTII,,, | Performed by: OBSTETRICS & GYNECOLOGY

## 2025-04-02 PROCEDURE — 1159F MED LIST DOCD IN RCRD: CPT | Mod: CPTII,,, | Performed by: OBSTETRICS & GYNECOLOGY

## 2025-04-02 PROCEDURE — 99999 PR PBB SHADOW E&M-EST. PATIENT-LVL III: CPT | Mod: PBBFAC,,, | Performed by: OBSTETRICS & GYNECOLOGY

## 2025-04-02 PROCEDURE — 99999PBSHW POCT URINE PREGNANCY: Mod: PBBFAC,,,

## 2025-04-02 PROCEDURE — 3074F SYST BP LT 130 MM HG: CPT | Mod: CPTII,,, | Performed by: OBSTETRICS & GYNECOLOGY

## 2025-04-02 NOTE — PROGRESS NOTES
SUBJECTIVE:   39 y.o. female  is here for an IUD check.  She denies any complaints and hasn't had any bleeding.    ROS:  GENERAL: No fever, chills, fatigability or weight loss.  VULVAR: No pain, no lesions and no itching.  VAGINAL: No relaxation, no itching, no discharge, no abnormal bleeding and no lesions.  ABDOMEN: No abdominal pain. Denies nausea. Denies vomiting. No diarrhea. No constipation  BREAST: Denies pain. No lumps. No discharge.  URINARY: No incontinence, no nocturia, no frequency and no dysuria.  CARDIOVASCULAR: No chest pain. No shortness of breath. No leg cramps.  NEUROLOGICAL: No headaches. No vision changes.        Vitals:    25 1016   BP: 102/60         OBJECTIVE:   She appears well, afebrile.  Abdomen: benign, soft, nontender, no masses.  VULVA: Normal external female genitalia, normal urethra, normal urethral meatus  VAGINA:no lesions  CERVIX: normal.  IUD string not visible      ASSESSMENT:   Katey was seen today for iud check.    Diagnoses and all orders for this visit:    Family planning, IUD (intrauterine device) check/reinsertion/removal  -     POCT urine pregnancy    Malpositioned intrauterine device (IUD), initial encounter  -     US Pelvis Comp with Transvag NON-OB (xpd); Future    UPT is negative.  Will get an ultrasound to evaluate IUD position.

## 2025-04-08 ENCOUNTER — RESULTS FOLLOW-UP (OUTPATIENT)
Dept: OBSTETRICS AND GYNECOLOGY | Facility: HOSPITAL | Age: 40
End: 2025-04-08

## 2025-04-08 ENCOUNTER — HOSPITAL ENCOUNTER (OUTPATIENT)
Dept: RADIOLOGY | Facility: OTHER | Age: 40
Discharge: HOME | End: 2025-04-08
Attending: OBSTETRICS & GYNECOLOGY
Payer: MEDICAID

## 2025-04-08 DIAGNOSIS — T83.32XA MALPOSITIONED INTRAUTERINE DEVICE (IUD), INITIAL ENCOUNTER: ICD-10-CM

## 2025-04-08 PROCEDURE — 76830 TRANSVAGINAL US NON-OB: CPT | Mod: TC

## 2025-08-15 ENCOUNTER — PATIENT MESSAGE (OUTPATIENT)
Dept: HEMATOLOGY/ONCOLOGY | Facility: CLINIC | Age: 40
End: 2025-08-15
Payer: MEDICAID

## 2025-09-03 ENCOUNTER — OFFICE VISIT (OUTPATIENT)
Dept: PRIMARY CARE CLINIC | Facility: CLINIC | Age: 40
End: 2025-09-03
Payer: MEDICAID

## 2025-09-03 VITALS
WEIGHT: 129.94 LBS | OXYGEN SATURATION: 97 % | HEIGHT: 64 IN | BODY MASS INDEX: 22.18 KG/M2 | SYSTOLIC BLOOD PRESSURE: 110 MMHG | TEMPERATURE: 98 F | DIASTOLIC BLOOD PRESSURE: 63 MMHG | HEART RATE: 104 BPM

## 2025-09-03 DIAGNOSIS — R01.1 HEART MURMUR: Primary | ICD-10-CM

## 2025-09-03 DIAGNOSIS — G44.52 NEW DAILY PERSISTENT HEADACHE: ICD-10-CM

## 2025-09-03 PROCEDURE — 3074F SYST BP LT 130 MM HG: CPT | Mod: CPTII,,, | Performed by: STUDENT IN AN ORGANIZED HEALTH CARE EDUCATION/TRAINING PROGRAM

## 2025-09-03 PROCEDURE — 99999 PR PBB SHADOW E&M-EST. PATIENT-LVL III: CPT | Mod: PBBFAC,,, | Performed by: STUDENT IN AN ORGANIZED HEALTH CARE EDUCATION/TRAINING PROGRAM

## 2025-09-03 PROCEDURE — 99214 OFFICE O/P EST MOD 30 MIN: CPT | Mod: S$PBB,,, | Performed by: STUDENT IN AN ORGANIZED HEALTH CARE EDUCATION/TRAINING PROGRAM

## 2025-09-03 PROCEDURE — 3008F BODY MASS INDEX DOCD: CPT | Mod: CPTII,,, | Performed by: STUDENT IN AN ORGANIZED HEALTH CARE EDUCATION/TRAINING PROGRAM

## 2025-09-03 PROCEDURE — 99213 OFFICE O/P EST LOW 20 MIN: CPT | Mod: PBBFAC,PN | Performed by: STUDENT IN AN ORGANIZED HEALTH CARE EDUCATION/TRAINING PROGRAM

## 2025-09-03 PROCEDURE — 1159F MED LIST DOCD IN RCRD: CPT | Mod: CPTII,,, | Performed by: STUDENT IN AN ORGANIZED HEALTH CARE EDUCATION/TRAINING PROGRAM

## 2025-09-03 PROCEDURE — 3078F DIAST BP <80 MM HG: CPT | Mod: CPTII,,, | Performed by: STUDENT IN AN ORGANIZED HEALTH CARE EDUCATION/TRAINING PROGRAM

## 2025-09-05 ENCOUNTER — HOSPITAL ENCOUNTER (OUTPATIENT)
Dept: CARDIOLOGY | Facility: HOSPITAL | Age: 40
Discharge: HOME | End: 2025-09-05
Attending: STUDENT IN AN ORGANIZED HEALTH CARE EDUCATION/TRAINING PROGRAM
Payer: MEDICAID

## 2025-09-05 ENCOUNTER — HOSPITAL ENCOUNTER (OUTPATIENT)
Dept: RADIOLOGY | Facility: HOSPITAL | Age: 40
Discharge: HOME | End: 2025-09-05
Attending: STUDENT IN AN ORGANIZED HEALTH CARE EDUCATION/TRAINING PROGRAM
Payer: MEDICAID

## 2025-09-05 VITALS
BODY MASS INDEX: 22.02 KG/M2 | HEART RATE: 69 BPM | DIASTOLIC BLOOD PRESSURE: 67 MMHG | WEIGHT: 129 LBS | HEIGHT: 64 IN | SYSTOLIC BLOOD PRESSURE: 117 MMHG

## 2025-09-05 DIAGNOSIS — R01.1 HEART MURMUR: ICD-10-CM

## 2025-09-05 DIAGNOSIS — G44.52 NEW DAILY PERSISTENT HEADACHE: ICD-10-CM

## 2025-09-05 LAB
AORTIC SIZE INDEX (SOV): 1.1 CM/M2
AORTIC SIZE INDEX: 1 CM/M2
ASCENDING AORTA: 1.7 CM
AV AREA BY CONTINUOUS VTI: 1.4 CM2
AV INDEX (PROSTH): 0.68
AV LVOT MEAN GRADIENT: 2 MMHG
AV LVOT PEAK GRADIENT: 4 MMHG
AV MEAN GRADIENT: 4 MMHG
AV PEAK GRADIENT: 8 MMHG
AV VALVE AREA BY VELOCITY RATIO: 1.4 CM²
AV VALVE AREA: 1.4 CM2
AV VELOCITY RATIO: 0.71
BSA FOR ECHO PROCEDURE: 1.63 M2
CV ECHO LV RWT: 0.38 CM
DOP CALC AO PEAK VEL: 1.4 M/S
DOP CALC AO VTI: 29 CM
DOP CALC LVOT AREA: 2 CM2
DOP CALC LVOT DIAMETER: 1.6 CM
DOP CALC LVOT PEAK VEL: 1 M/S
DOP CALC RVOT AREA: 1.77 CM2
DOP CALC RVOT DIAMETER: 1.5 CM
DOP CALCLVOT PEAK VEL VTI: 19.8 CM
E WAVE DECELERATION TIME: 177 MS
E/A RATIO: 2.27
E/E' RATIO: 5 M/S
ECHO EF ESTIMATED: 61 %
ECHO LV POSTERIOR WALL: 0.7 CM (ref 0.6–1.1)
FRACTIONAL SHORTENING: 32.4 % (ref 28–44)
HR MV ECHO: 69 BPM
INTERVENTRICULAR SEPTUM: 0.6 CM (ref 0.6–1.1)
IVC DIAMETER: 1.44 CM
IVRT: 51 MS
LA MAJOR: 4.5 CM
LA MINOR: 4.4 CM
LA WIDTH: 3.6 CM
LEFT ATRIUM SIZE: 2.7 CM
LEFT ATRIUM VOLUME INDEX MOD: 19 ML/M2
LEFT ATRIUM VOLUME INDEX: 23 ML/M2
LEFT ATRIUM VOLUME MOD: 30 ML
LEFT ATRIUM VOLUME: 37 CM3
LEFT INTERNAL DIMENSION IN SYSTOLE: 2.5 CM (ref 2.1–4)
LEFT VENTRICLE DIASTOLIC VOLUME INDEX: 34.57 ML/M2
LEFT VENTRICLE DIASTOLIC VOLUME: 56 ML
LEFT VENTRICLE MASS INDEX: 38.6 G/M2
LEFT VENTRICLE SYSTOLIC VOLUME INDEX: 13.6 ML/M2
LEFT VENTRICLE SYSTOLIC VOLUME: 22 ML
LEFT VENTRICULAR INTERNAL DIMENSION IN DIASTOLE: 3.7 CM (ref 3.5–6)
LEFT VENTRICULAR MASS: 62.5 G
LV LATERAL E/E' RATIO: 4.7
LV SEPTAL E/E' RATIO: 4.4
Lab: 1 CM/M
Lab: 1.1 CM/M
MV A" WAVE DURATION": 88.49 MS
MV MEAN GRADIENT: 1 MMHG
MV PEAK A VEL: 0.41 M/S
MV PEAK E VEL: 0.93 M/S
MV PEAK GRADIENT: 4 MMHG
OHS CV CPX PATIENT HEIGHT IN: 64
OHS CV RV/LV RATIO: 0.73 CM
PISA TR MAX VEL: 2.4 M/S
PULM VEIN A" WAVE DURATION": 88.49 MS
PULM VEIN S/D RATIO: 0.75
PULMONIC VEIN PEAK A VELOCITY: 0.2 M/S
PV PEAK D VEL: 0.71 M/S
PV PEAK S VEL: 0.53 M/S
RA MAJOR: 3.97 CM
RA PRESSURE ESTIMATED: 3 MMHG
RA WIDTH: 3.22 CM
RIGHT ATRIAL AREA: 10.8 CM2
RIGHT VENTRICLE DIASTOLIC BASEL DIMENSION: 2.7 CM
RV TB RVSP: 5 MMHG
RV TISSUE DOPPLER FREE WALL SYSTOLIC VELOCITY 1 (APICAL 4 CHAMBER VIEW): 15.15 CM/S
SINUS: 1.8 CM
STJ: 1.5 CM
TDI LATERAL: 0.2 M/S
TDI SEPTAL: 0.21 M/S
TDI: 0.21 M/S
TRICUSPID ANNULAR PLANE SYSTOLIC EXCURSION: 2.3 CM
TV PEAK GRADIENT: 24 MMHG
TV REST PULMONARY ARTERY PRESSURE: 26 MMHG
Z-SCORE OF LEFT VENTRICULAR DIMENSION IN END DIASTOLE: -2.14
Z-SCORE OF LEFT VENTRICULAR DIMENSION IN END SYSTOLE: -1.01

## 2025-09-05 PROCEDURE — 93306 TTE W/DOPPLER COMPLETE: CPT

## 2025-09-05 PROCEDURE — 70450 CT HEAD/BRAIN W/O DYE: CPT | Mod: TC

## 2025-09-05 PROCEDURE — 70450 CT HEAD/BRAIN W/O DYE: CPT | Mod: 26,,, | Performed by: STUDENT IN AN ORGANIZED HEALTH CARE EDUCATION/TRAINING PROGRAM

## 2025-09-05 PROCEDURE — 93306 TTE W/DOPPLER COMPLETE: CPT | Mod: 26,,, | Performed by: INTERNAL MEDICINE
